# Patient Record
Sex: MALE | Race: WHITE | NOT HISPANIC OR LATINO | Employment: OTHER | URBAN - METROPOLITAN AREA
[De-identification: names, ages, dates, MRNs, and addresses within clinical notes are randomized per-mention and may not be internally consistent; named-entity substitution may affect disease eponyms.]

---

## 2003-12-02 LAB — HCV AB SER-ACNC: POSITIVE

## 2017-08-14 ENCOUNTER — GENERIC CONVERSION - ENCOUNTER (OUTPATIENT)
Dept: OTHER | Facility: OTHER | Age: 57
End: 2017-08-14

## 2017-08-17 ENCOUNTER — ALLSCRIPTS OFFICE VISIT (OUTPATIENT)
Dept: OTHER | Facility: OTHER | Age: 57
End: 2017-08-17

## 2018-01-15 VITALS
RESPIRATION RATE: 15 BRPM | BODY MASS INDEX: 34.23 KG/M2 | DIASTOLIC BLOOD PRESSURE: 68 MMHG | HEIGHT: 71 IN | HEART RATE: 100 BPM | SYSTOLIC BLOOD PRESSURE: 120 MMHG | OXYGEN SATURATION: 96 % | WEIGHT: 244.5 LBS

## 2018-01-29 ENCOUNTER — TELEPHONE (OUTPATIENT)
Dept: FAMILY MEDICINE CLINIC | Facility: CLINIC | Age: 58
End: 2018-01-29

## 2018-01-29 DIAGNOSIS — I10 ESSENTIAL HYPERTENSION: Primary | ICD-10-CM

## 2018-01-29 DIAGNOSIS — E11.610 DIABETIC NEUROPATHIC ARTHRITIS (HCC): Primary | ICD-10-CM

## 2018-01-29 DIAGNOSIS — G62.9 NEUROPATHY: ICD-10-CM

## 2018-01-29 RX ORDER — GABAPENTIN 100 MG/1
1 CAPSULE ORAL 3 TIMES DAILY
COMMUNITY
Start: 2017-10-23 | End: 2018-01-29 | Stop reason: SDUPTHER

## 2018-01-29 RX ORDER — LISINOPRIL 5 MG/1
5 TABLET ORAL DAILY
Qty: 30 TABLET | Refills: 5 | Status: SHIPPED | OUTPATIENT
Start: 2018-01-29 | End: 2018-08-09 | Stop reason: SDUPTHER

## 2018-01-29 RX ORDER — GABAPENTIN 300 MG/1
300 CAPSULE ORAL 3 TIMES DAILY
Qty: 90 CAPSULE | Refills: 5 | Status: SHIPPED | OUTPATIENT
Start: 2018-01-29 | End: 2018-08-13 | Stop reason: SDUPTHER

## 2018-01-29 RX ORDER — LISINOPRIL 5 MG/1
TABLET ORAL
COMMUNITY
End: 2018-01-29 | Stop reason: SDUPTHER

## 2018-01-29 RX ORDER — GABAPENTIN 100 MG/1
100 CAPSULE ORAL 3 TIMES DAILY
Qty: 90 CAPSULE | Refills: 5 | Status: SHIPPED | OUTPATIENT
Start: 2018-01-29 | End: 2018-01-29

## 2018-02-21 ENCOUNTER — TELEPHONE (OUTPATIENT)
Dept: FAMILY MEDICINE CLINIC | Facility: CLINIC | Age: 58
End: 2018-02-21

## 2018-02-21 DIAGNOSIS — E78.5 HYPERLIPIDEMIA, UNSPECIFIED HYPERLIPIDEMIA TYPE: Primary | ICD-10-CM

## 2018-02-21 RX ORDER — ATORVASTATIN CALCIUM 40 MG/1
1 TABLET, FILM COATED ORAL DAILY
COMMUNITY
Start: 2017-08-17 | End: 2018-02-22 | Stop reason: SDUPTHER

## 2018-02-22 RX ORDER — ATORVASTATIN CALCIUM 40 MG/1
40 TABLET, FILM COATED ORAL DAILY
Qty: 30 TABLET | Refills: 5 | Status: SHIPPED | OUTPATIENT
Start: 2018-02-22 | End: 2018-08-24 | Stop reason: SDUPTHER

## 2018-03-15 DIAGNOSIS — E11.9 TYPE 2 DIABETES MELLITUS WITHOUT COMPLICATION, WITHOUT LONG-TERM CURRENT USE OF INSULIN (HCC): Primary | ICD-10-CM

## 2018-03-16 DIAGNOSIS — E11.9 TYPE 2 DIABETES MELLITUS WITHOUT COMPLICATION, WITHOUT LONG-TERM CURRENT USE OF INSULIN (HCC): ICD-10-CM

## 2018-03-16 RX ORDER — SITAGLIPTIN AND METFORMIN HYDROCHLORIDE 1000; 50 MG/1; MG/1
TABLET, FILM COATED ORAL
Qty: 60 TABLET | Refills: 5 | Status: SHIPPED | OUTPATIENT
Start: 2018-03-16 | End: 2018-03-16 | Stop reason: SDUPTHER

## 2018-05-22 DIAGNOSIS — E11.8 TYPE 2 DIABETES MELLITUS WITH COMPLICATION, UNSPECIFIED WHETHER LONG TERM INSULIN USE: Primary | ICD-10-CM

## 2018-05-22 RX ORDER — HUMAN INSULIN 100 [IU]/ML
70 INJECTION, SUSPENSION SUBCUTANEOUS 2 TIMES DAILY
COMMUNITY
Start: 2018-02-21 | End: 2018-05-22 | Stop reason: SDUPTHER

## 2018-05-22 RX ORDER — HUMAN INSULIN 100 [IU]/ML
70 INJECTION, SUSPENSION SUBCUTANEOUS 2 TIMES DAILY
Qty: 30 ML | Refills: 5 | Status: SHIPPED | OUTPATIENT
Start: 2018-05-22 | End: 2018-09-28 | Stop reason: SDUPTHER

## 2018-08-09 DIAGNOSIS — I10 ESSENTIAL HYPERTENSION: ICD-10-CM

## 2018-08-09 PROCEDURE — 4010F ACE/ARB THERAPY RXD/TAKEN: CPT | Performed by: FAMILY MEDICINE

## 2018-08-09 RX ORDER — LISINOPRIL 5 MG/1
5 TABLET ORAL DAILY
Qty: 30 TABLET | Refills: 5 | Status: SHIPPED | OUTPATIENT
Start: 2018-08-09 | End: 2018-09-28

## 2018-08-13 DIAGNOSIS — E11.610 DIABETIC NEUROPATHIC ARTHRITIS (HCC): ICD-10-CM

## 2018-08-13 RX ORDER — GABAPENTIN 300 MG/1
300 CAPSULE ORAL 3 TIMES DAILY
Qty: 90 CAPSULE | Refills: 1 | Status: SHIPPED | OUTPATIENT
Start: 2018-08-13 | End: 2018-10-16 | Stop reason: SDUPTHER

## 2018-08-24 DIAGNOSIS — E78.5 HYPERLIPIDEMIA, UNSPECIFIED HYPERLIPIDEMIA TYPE: ICD-10-CM

## 2018-08-24 RX ORDER — ATORVASTATIN CALCIUM 40 MG/1
40 TABLET, FILM COATED ORAL DAILY
Qty: 30 TABLET | Refills: 5 | Status: SHIPPED | OUTPATIENT
Start: 2018-08-24 | End: 2019-02-19 | Stop reason: SDUPTHER

## 2018-09-15 DIAGNOSIS — E11.9 TYPE 2 DIABETES MELLITUS WITHOUT COMPLICATION, WITHOUT LONG-TERM CURRENT USE OF INSULIN (HCC): ICD-10-CM

## 2018-09-17 RX ORDER — SITAGLIPTIN AND METFORMIN HYDROCHLORIDE 1000; 50 MG/1; MG/1
TABLET, FILM COATED ORAL
Qty: 60 TABLET | Refills: 5 | Status: SHIPPED | OUTPATIENT
Start: 2018-09-17 | End: 2018-09-28

## 2018-09-27 RX ORDER — GABAPENTIN 300 MG/1
CAPSULE ORAL
COMMUNITY
Start: 2018-09-13 | End: 2019-03-29 | Stop reason: SDUPTHER

## 2018-09-28 ENCOUNTER — OFFICE VISIT (OUTPATIENT)
Dept: FAMILY MEDICINE CLINIC | Facility: CLINIC | Age: 58
End: 2018-09-28
Payer: COMMERCIAL

## 2018-09-28 ENCOUNTER — TELEPHONE (OUTPATIENT)
Dept: FAMILY MEDICINE CLINIC | Facility: CLINIC | Age: 58
End: 2018-09-28

## 2018-09-28 VITALS
HEIGHT: 70 IN | HEART RATE: 97 BPM | WEIGHT: 251.4 LBS | TEMPERATURE: 98.2 F | BODY MASS INDEX: 35.99 KG/M2 | RESPIRATION RATE: 15 BRPM | OXYGEN SATURATION: 98 % | SYSTOLIC BLOOD PRESSURE: 130 MMHG | DIASTOLIC BLOOD PRESSURE: 80 MMHG

## 2018-09-28 DIAGNOSIS — Z23 NEED FOR INFLUENZA VACCINATION: Primary | ICD-10-CM

## 2018-09-28 DIAGNOSIS — I10 ESSENTIAL HYPERTENSION: ICD-10-CM

## 2018-09-28 DIAGNOSIS — R52 PAIN: Primary | ICD-10-CM

## 2018-09-28 DIAGNOSIS — E78.5 HYPERLIPIDEMIA, UNSPECIFIED HYPERLIPIDEMIA TYPE: ICD-10-CM

## 2018-09-28 DIAGNOSIS — Z23 NEED FOR PNEUMOCOCCAL VACCINATION: ICD-10-CM

## 2018-09-28 DIAGNOSIS — E11.8 TYPE 2 DIABETES MELLITUS WITH COMPLICATION, UNSPECIFIED WHETHER LONG TERM INSULIN USE: ICD-10-CM

## 2018-09-28 PROBLEM — E11.9 DIABETES (HCC): Status: ACTIVE | Noted: 2018-09-28

## 2018-09-28 PROCEDURE — 3079F DIAST BP 80-89 MM HG: CPT | Performed by: FAMILY MEDICINE

## 2018-09-28 PROCEDURE — 3008F BODY MASS INDEX DOCD: CPT | Performed by: FAMILY MEDICINE

## 2018-09-28 PROCEDURE — 90732 PPSV23 VACC 2 YRS+ SUBQ/IM: CPT

## 2018-09-28 PROCEDURE — 3075F SYST BP GE 130 - 139MM HG: CPT | Performed by: FAMILY MEDICINE

## 2018-09-28 PROCEDURE — G0008 ADMIN INFLUENZA VIRUS VAC: HCPCS

## 2018-09-28 PROCEDURE — 4010F ACE/ARB THERAPY RXD/TAKEN: CPT | Performed by: FAMILY MEDICINE

## 2018-09-28 PROCEDURE — G0009 ADMIN PNEUMOCOCCAL VACCINE: HCPCS

## 2018-09-28 PROCEDURE — 3725F SCREEN DEPRESSION PERFORMED: CPT | Performed by: FAMILY MEDICINE

## 2018-09-28 PROCEDURE — 99214 OFFICE O/P EST MOD 30 MIN: CPT | Performed by: FAMILY MEDICINE

## 2018-09-28 PROCEDURE — 90682 RIV4 VACC RECOMBINANT DNA IM: CPT

## 2018-09-28 RX ORDER — IBUPROFEN 800 MG/1
800 TABLET ORAL EVERY 6 HOURS PRN
Qty: 120 TABLET | Refills: 5 | Status: SHIPPED | OUTPATIENT
Start: 2018-09-28 | End: 2019-10-03 | Stop reason: SDUPTHER

## 2018-09-28 RX ORDER — HUMAN INSULIN 100 [IU]/ML
70 INJECTION, SUSPENSION SUBCUTANEOUS 2 TIMES DAILY
Qty: 30 ML | Refills: 5 | Status: SHIPPED | OUTPATIENT
Start: 2018-09-28 | End: 2019-02-11 | Stop reason: SDUPTHER

## 2018-09-28 RX ORDER — LISINOPRIL 10 MG/1
10 TABLET ORAL DAILY
Qty: 30 TABLET | Refills: 5 | Status: SHIPPED | OUTPATIENT
Start: 2018-09-28 | End: 2019-04-01 | Stop reason: SDUPTHER

## 2018-09-28 RX ORDER — GLIPIZIDE AND METFORMIN HCL 5; 500 MG/1; MG/1
1 TABLET, FILM COATED ORAL
Qty: 60 TABLET | Refills: 5 | Status: SHIPPED | OUTPATIENT
Start: 2018-09-28 | End: 2019-03-29 | Stop reason: SDUPTHER

## 2018-09-28 NOTE — PROGRESS NOTES
Assessment/Plan:    No problem-specific Assessment & Plan notes found for this encounter  Diagnoses and all orders for this visit:    Need for influenza vaccination  -     influenza vaccine, 5918-9556, quadrivalent, recombinant, PF, 0 5 mL, for patients 18 yr+ ([facility])    Type 2 diabetes mellitus with complication, unspecified whether long term insulin use (HCC)  -     NOVOLIN 70/30 RELION (70-30) 100 UNIT/ML subcutaneous injection; Inject 70 Units under the skin 2 (two) times a day  -     glipiZIDE-metFORMIN (METAGLIP) 5-500 MG per tablet; Take 1 tablet by mouth 2 (two) times a day before meals  -     lisinopril (ZESTRIL) 10 mg tablet; Take 1 tablet (10 mg total) by mouth daily  -     Hemoglobin A1c (w/out EAG); Future  -     Lipid panel; Future  -     Comprehensive metabolic panel; Future  -     Microalbumin / creatinine urine ratio  -     TSH, 3rd generation; Future    Essential hypertension  -     CBC and differential; Future    Hyperlipidemia, unspecified hyperlipidemia type  -     Lipid panel; Future  -     Comprehensive metabolic panel; Future          Subjective:      Patient ID: Kris Garcia is a 63 y o  male  He is not taking Janumet due to cost   His blood sugar is not well controlled  His BP is fairly well controlled  He has no CP or SOB  He has no HA or vision changes  He is on high intensity statin therapy  He has no myalgia or muscle weakness  The following portions of the patient's history were reviewed and updated as appropriate: allergies, current medications, past family history, past medical history, past social history and past surgical history  Review of Systems   All other systems reviewed and are negative          Objective:      /80 (BP Location: Right arm, Patient Position: Sitting, Cuff Size: Large)   Pulse 97   Temp 98 2 °F (36 8 °C) (Temporal)   Resp 15   Ht 5' 10" (1 778 m)   Wt 114 kg (251 lb 6 4 oz)   SpO2 98%   BMI 36 07 kg/m² Physical Exam   Constitutional: He is oriented to person, place, and time  He appears well-developed and well-nourished  Neck: Normal range of motion  Neck supple  Cardiovascular: Normal rate, regular rhythm, normal heart sounds and intact distal pulses  Abdominal: Soft  Bowel sounds are normal    Musculoskeletal: Normal range of motion  Neurological: He is alert and oriented to person, place, and time  He has normal reflexes  Skin: Skin is warm and dry  Psychiatric: He has a normal mood and affect  His behavior is normal  Judgment and thought content normal    Nursing note and vitals reviewed

## 2018-10-16 DIAGNOSIS — E11.610 DIABETIC NEUROPATHIC ARTHRITIS (HCC): ICD-10-CM

## 2018-10-16 RX ORDER — GABAPENTIN 300 MG/1
300 CAPSULE ORAL 3 TIMES DAILY
Qty: 90 CAPSULE | Refills: 5 | Status: SHIPPED | OUTPATIENT
Start: 2018-10-16 | End: 2018-10-16 | Stop reason: SDUPTHER

## 2018-10-16 RX ORDER — GABAPENTIN 300 MG/1
300 CAPSULE ORAL 3 TIMES DAILY
Qty: 90 CAPSULE | Refills: 5 | Status: SHIPPED | OUTPATIENT
Start: 2018-10-16 | End: 2021-08-02 | Stop reason: SDUPTHER

## 2019-02-11 ENCOUNTER — TELEPHONE (OUTPATIENT)
Dept: FAMILY MEDICINE CLINIC | Facility: CLINIC | Age: 59
End: 2019-02-11

## 2019-02-11 DIAGNOSIS — E11.8 TYPE 2 DIABETES MELLITUS WITH COMPLICATION, UNSPECIFIED WHETHER LONG TERM INSULIN USE: ICD-10-CM

## 2019-02-11 RX ORDER — HUMAN INSULIN 100 [IU]/ML
70 INJECTION, SUSPENSION SUBCUTANEOUS 2 TIMES DAILY
Qty: 30 ML | Refills: 5 | Status: SHIPPED | OUTPATIENT
Start: 2019-02-11 | End: 2019-06-04 | Stop reason: SDUPTHER

## 2019-02-19 DIAGNOSIS — E78.5 HYPERLIPIDEMIA, UNSPECIFIED HYPERLIPIDEMIA TYPE: ICD-10-CM

## 2019-02-19 RX ORDER — ATORVASTATIN CALCIUM 40 MG/1
40 TABLET, FILM COATED ORAL DAILY
Qty: 30 TABLET | Refills: 5 | Status: SHIPPED | OUTPATIENT
Start: 2019-02-19 | End: 2019-05-13 | Stop reason: SDUPTHER

## 2019-02-26 ENCOUNTER — CLINICAL SUPPORT (OUTPATIENT)
Dept: FAMILY MEDICINE CLINIC | Facility: CLINIC | Age: 59
End: 2019-02-26
Payer: COMMERCIAL

## 2019-02-26 DIAGNOSIS — I10 ESSENTIAL HYPERTENSION: ICD-10-CM

## 2019-02-26 DIAGNOSIS — E11.8 TYPE 2 DIABETES MELLITUS WITH COMPLICATION, UNSPECIFIED WHETHER LONG TERM INSULIN USE: ICD-10-CM

## 2019-02-26 DIAGNOSIS — E78.5 HYPERLIPIDEMIA, UNSPECIFIED HYPERLIPIDEMIA TYPE: ICD-10-CM

## 2019-02-26 LAB
ALBUMIN SERPL BCP-MCNC: 4 G/DL (ref 3.5–5)
ALP SERPL-CCNC: 61 U/L (ref 46–116)
ALT SERPL W P-5'-P-CCNC: 58 U/L (ref 12–78)
ANION GAP SERPL CALCULATED.3IONS-SCNC: 4 MMOL/L (ref 4–13)
AST SERPL W P-5'-P-CCNC: 39 U/L (ref 5–45)
BASOPHILS # BLD AUTO: 0.1 THOUSANDS/ΜL (ref 0–0.1)
BASOPHILS NFR BLD AUTO: 1 % (ref 0–1)
BILIRUB SERPL-MCNC: 0.52 MG/DL (ref 0.2–1)
BUN SERPL-MCNC: 17 MG/DL (ref 5–25)
CALCIUM SERPL-MCNC: 9.2 MG/DL (ref 8.3–10.1)
CHLORIDE SERPL-SCNC: 101 MMOL/L (ref 100–108)
CHOLEST SERPL-MCNC: 128 MG/DL (ref 50–200)
CO2 SERPL-SCNC: 28 MMOL/L (ref 21–32)
CREAT SERPL-MCNC: 1.42 MG/DL (ref 0.6–1.3)
EOSINOPHIL # BLD AUTO: 0.17 THOUSAND/ΜL (ref 0–0.61)
EOSINOPHIL NFR BLD AUTO: 2 % (ref 0–6)
ERYTHROCYTE [DISTWIDTH] IN BLOOD BY AUTOMATED COUNT: 12.7 % (ref 11.6–15.1)
EST. AVERAGE GLUCOSE BLD GHB EST-MCNC: 255 MG/DL
GFR SERPL CREATININE-BSD FRML MDRD: 54 ML/MIN/1.73SQ M
GLUCOSE P FAST SERPL-MCNC: 238 MG/DL (ref 65–99)
HBA1C MFR BLD: 10.5 % (ref 4.2–6.3)
HCT VFR BLD AUTO: 42.5 % (ref 36.5–49.3)
HDLC SERPL-MCNC: 56 MG/DL (ref 40–60)
HGB BLD-MCNC: 13.6 G/DL (ref 12–17)
IMM GRANULOCYTES # BLD AUTO: 0.03 THOUSAND/UL (ref 0–0.2)
IMM GRANULOCYTES NFR BLD AUTO: 0 % (ref 0–2)
LDLC SERPL CALC-MCNC: 56 MG/DL (ref 0–100)
LYMPHOCYTES # BLD AUTO: 2.07 THOUSANDS/ΜL (ref 0.6–4.47)
LYMPHOCYTES NFR BLD AUTO: 25 % (ref 14–44)
MCH RBC QN AUTO: 31.3 PG (ref 26.8–34.3)
MCHC RBC AUTO-ENTMCNC: 32 G/DL (ref 31.4–37.4)
MCV RBC AUTO: 98 FL (ref 82–98)
MONOCYTES # BLD AUTO: 0.66 THOUSAND/ΜL (ref 0.17–1.22)
MONOCYTES NFR BLD AUTO: 8 % (ref 4–12)
NEUTROPHILS # BLD AUTO: 5.32 THOUSANDS/ΜL (ref 1.85–7.62)
NEUTS SEG NFR BLD AUTO: 64 % (ref 43–75)
NONHDLC SERPL-MCNC: 72 MG/DL
NRBC BLD AUTO-RTO: 0 /100 WBCS
PLATELET # BLD AUTO: 258 THOUSANDS/UL (ref 149–390)
PMV BLD AUTO: 10.3 FL (ref 8.9–12.7)
POTASSIUM SERPL-SCNC: 4.6 MMOL/L (ref 3.5–5.3)
PROT SERPL-MCNC: 8.1 G/DL (ref 6.4–8.2)
RBC # BLD AUTO: 4.34 MILLION/UL (ref 3.88–5.62)
SODIUM SERPL-SCNC: 133 MMOL/L (ref 136–145)
TRIGL SERPL-MCNC: 81 MG/DL
TSH SERPL DL<=0.05 MIU/L-ACNC: 2.56 UIU/ML (ref 0.36–3.74)
WBC # BLD AUTO: 8.35 THOUSAND/UL (ref 4.31–10.16)

## 2019-02-26 PROCEDURE — 83036 HEMOGLOBIN GLYCOSYLATED A1C: CPT | Performed by: FAMILY MEDICINE

## 2019-02-26 PROCEDURE — 80053 COMPREHEN METABOLIC PANEL: CPT | Performed by: FAMILY MEDICINE

## 2019-02-26 PROCEDURE — 36415 COLL VENOUS BLD VENIPUNCTURE: CPT | Performed by: FAMILY MEDICINE

## 2019-02-26 PROCEDURE — 80061 LIPID PANEL: CPT | Performed by: FAMILY MEDICINE

## 2019-02-26 PROCEDURE — 85025 COMPLETE CBC W/AUTO DIFF WBC: CPT | Performed by: FAMILY MEDICINE

## 2019-02-26 PROCEDURE — 84443 ASSAY THYROID STIM HORMONE: CPT | Performed by: FAMILY MEDICINE

## 2019-03-29 ENCOUNTER — OFFICE VISIT (OUTPATIENT)
Dept: FAMILY MEDICINE CLINIC | Facility: CLINIC | Age: 59
End: 2019-03-29
Payer: COMMERCIAL

## 2019-03-29 VITALS
BODY MASS INDEX: 37.11 KG/M2 | HEIGHT: 70 IN | DIASTOLIC BLOOD PRESSURE: 70 MMHG | HEART RATE: 90 BPM | WEIGHT: 259.2 LBS | RESPIRATION RATE: 15 BRPM | SYSTOLIC BLOOD PRESSURE: 118 MMHG | OXYGEN SATURATION: 96 %

## 2019-03-29 DIAGNOSIS — Z00.00 MEDICARE ANNUAL WELLNESS VISIT, INITIAL: ICD-10-CM

## 2019-03-29 DIAGNOSIS — E11.42 DIABETIC POLYNEUROPATHY ASSOCIATED WITH TYPE 2 DIABETES MELLITUS (HCC): Primary | ICD-10-CM

## 2019-03-29 DIAGNOSIS — E78.2 MIXED HYPERLIPIDEMIA: ICD-10-CM

## 2019-03-29 DIAGNOSIS — I10 ESSENTIAL HYPERTENSION: ICD-10-CM

## 2019-03-29 DIAGNOSIS — E11.8 TYPE 2 DIABETES MELLITUS WITH COMPLICATION, UNSPECIFIED WHETHER LONG TERM INSULIN USE: ICD-10-CM

## 2019-03-29 PROCEDURE — G0439 PPPS, SUBSEQ VISIT: HCPCS | Performed by: FAMILY MEDICINE

## 2019-03-29 PROCEDURE — 99214 OFFICE O/P EST MOD 30 MIN: CPT | Performed by: FAMILY MEDICINE

## 2019-03-29 PROCEDURE — 3078F DIAST BP <80 MM HG: CPT | Performed by: FAMILY MEDICINE

## 2019-03-29 PROCEDURE — 3074F SYST BP LT 130 MM HG: CPT | Performed by: FAMILY MEDICINE

## 2019-03-29 PROCEDURE — 3008F BODY MASS INDEX DOCD: CPT | Performed by: FAMILY MEDICINE

## 2019-03-29 RX ORDER — GLIPIZIDE AND METFORMIN HCL 5; 500 MG/1; MG/1
2 TABLET, FILM COATED ORAL
Qty: 120 TABLET | Refills: 5 | Status: SHIPPED | OUTPATIENT
Start: 2019-03-29 | End: 2019-04-02 | Stop reason: SDUPTHER

## 2019-03-29 RX ORDER — GABAPENTIN 300 MG/1
600 CAPSULE ORAL 3 TIMES DAILY
Qty: 90 CAPSULE | Refills: 5 | Status: SHIPPED | OUTPATIENT
Start: 2019-03-29 | End: 2019-04-10 | Stop reason: SDUPTHER

## 2019-04-01 DIAGNOSIS — E11.8 TYPE 2 DIABETES MELLITUS WITH COMPLICATION, UNSPECIFIED WHETHER LONG TERM INSULIN USE: ICD-10-CM

## 2019-04-01 PROCEDURE — 4010F ACE/ARB THERAPY RXD/TAKEN: CPT | Performed by: FAMILY MEDICINE

## 2019-04-01 RX ORDER — LISINOPRIL 10 MG/1
TABLET ORAL
Qty: 30 TABLET | Refills: 5 | Status: SHIPPED | OUTPATIENT
Start: 2019-04-01 | End: 2019-05-13 | Stop reason: SDUPTHER

## 2019-04-02 DIAGNOSIS — E11.8 TYPE 2 DIABETES MELLITUS WITH COMPLICATION, UNSPECIFIED WHETHER LONG TERM INSULIN USE: ICD-10-CM

## 2019-04-03 RX ORDER — GLIPIZIDE AND METFORMIN HCL 5; 500 MG/1; MG/1
TABLET, FILM COATED ORAL
Qty: 60 TABLET | Refills: 5 | Status: SHIPPED | OUTPATIENT
Start: 2019-04-03 | End: 2019-09-07 | Stop reason: SDUPTHER

## 2019-04-10 ENCOUNTER — TELEPHONE (OUTPATIENT)
Dept: FAMILY MEDICINE CLINIC | Facility: CLINIC | Age: 59
End: 2019-04-10

## 2019-04-10 DIAGNOSIS — E11.42 DIABETIC POLYNEUROPATHY ASSOCIATED WITH TYPE 2 DIABETES MELLITUS (HCC): ICD-10-CM

## 2019-04-10 DIAGNOSIS — E11.8 TYPE 2 DIABETES MELLITUS WITH COMPLICATION, UNSPECIFIED WHETHER LONG TERM INSULIN USE: ICD-10-CM

## 2019-04-10 RX ORDER — GABAPENTIN 300 MG/1
600 CAPSULE ORAL 3 TIMES DAILY
Qty: 180 CAPSULE | Refills: 5 | Status: SHIPPED | OUTPATIENT
Start: 2019-04-10 | End: 2021-08-02 | Stop reason: SDUPTHER

## 2019-05-13 DIAGNOSIS — E78.5 HYPERLIPIDEMIA, UNSPECIFIED HYPERLIPIDEMIA TYPE: ICD-10-CM

## 2019-05-13 DIAGNOSIS — E11.8 TYPE 2 DIABETES MELLITUS WITH COMPLICATION, UNSPECIFIED WHETHER LONG TERM INSULIN USE: ICD-10-CM

## 2019-05-13 RX ORDER — LISINOPRIL 10 MG/1
10 TABLET ORAL DAILY
Qty: 90 TABLET | Refills: 3 | Status: SHIPPED | OUTPATIENT
Start: 2019-05-13 | End: 2019-05-15 | Stop reason: SDUPTHER

## 2019-05-13 RX ORDER — ATORVASTATIN CALCIUM 40 MG/1
40 TABLET, FILM COATED ORAL DAILY
Qty: 90 TABLET | Refills: 3 | Status: SHIPPED | OUTPATIENT
Start: 2019-05-13 | End: 2019-05-15 | Stop reason: SDUPTHER

## 2019-05-15 DIAGNOSIS — E78.5 HYPERLIPIDEMIA, UNSPECIFIED HYPERLIPIDEMIA TYPE: ICD-10-CM

## 2019-05-15 DIAGNOSIS — E11.8 TYPE 2 DIABETES MELLITUS WITH COMPLICATION, UNSPECIFIED WHETHER LONG TERM INSULIN USE: ICD-10-CM

## 2019-05-15 RX ORDER — ATORVASTATIN CALCIUM 40 MG/1
40 TABLET, FILM COATED ORAL DAILY
Qty: 90 TABLET | Refills: 3 | Status: SHIPPED | OUTPATIENT
Start: 2019-05-15 | End: 2020-02-27

## 2019-05-15 RX ORDER — LISINOPRIL 10 MG/1
10 TABLET ORAL DAILY
Qty: 90 TABLET | Refills: 3 | Status: SHIPPED | OUTPATIENT
Start: 2019-05-15 | End: 2020-02-27

## 2019-06-04 DIAGNOSIS — E11.8 TYPE 2 DIABETES MELLITUS WITH COMPLICATION, UNSPECIFIED WHETHER LONG TERM INSULIN USE: ICD-10-CM

## 2019-06-04 RX ORDER — HUMAN INSULIN 100 [IU]/ML
70 INJECTION, SUSPENSION SUBCUTANEOUS 2 TIMES DAILY
Qty: 30 ML | Refills: 5 | Status: SHIPPED | OUTPATIENT
Start: 2019-06-04 | End: 2019-06-12 | Stop reason: SDUPTHER

## 2019-06-12 ENCOUNTER — TELEPHONE (OUTPATIENT)
Dept: FAMILY MEDICINE CLINIC | Facility: CLINIC | Age: 59
End: 2019-06-12

## 2019-06-12 DIAGNOSIS — E11.8 TYPE 2 DIABETES MELLITUS WITH COMPLICATION, UNSPECIFIED WHETHER LONG TERM INSULIN USE: ICD-10-CM

## 2019-06-12 RX ORDER — HUMAN INSULIN 100 [IU]/ML
70 INJECTION, SUSPENSION SUBCUTANEOUS 2 TIMES DAILY
Qty: 40 ML | Refills: 5 | Status: SHIPPED | OUTPATIENT
Start: 2019-06-12 | End: 2019-06-24 | Stop reason: SDUPTHER

## 2019-06-24 DIAGNOSIS — E11.8 TYPE 2 DIABETES MELLITUS WITH COMPLICATION, UNSPECIFIED WHETHER LONG TERM INSULIN USE: ICD-10-CM

## 2019-06-24 RX ORDER — HUMAN INSULIN 100 [IU]/ML
70 INJECTION, SUSPENSION SUBCUTANEOUS 2 TIMES DAILY
Qty: 40 ML | Refills: 5 | Status: SHIPPED | OUTPATIENT
Start: 2019-06-24 | End: 2020-01-07

## 2019-07-11 ENCOUNTER — OFFICE VISIT (OUTPATIENT)
Dept: FAMILY MEDICINE CLINIC | Facility: CLINIC | Age: 59
End: 2019-07-11
Payer: COMMERCIAL

## 2019-07-11 VITALS
WEIGHT: 252 LBS | HEART RATE: 89 BPM | HEIGHT: 70 IN | OXYGEN SATURATION: 98 % | DIASTOLIC BLOOD PRESSURE: 74 MMHG | BODY MASS INDEX: 36.08 KG/M2 | RESPIRATION RATE: 16 BRPM | SYSTOLIC BLOOD PRESSURE: 124 MMHG

## 2019-07-11 DIAGNOSIS — E11.42 TYPE 2 DIABETES MELLITUS WITH DIABETIC POLYNEUROPATHY, WITH LONG-TERM CURRENT USE OF INSULIN (HCC): ICD-10-CM

## 2019-07-11 DIAGNOSIS — Z79.4 TYPE 2 DIABETES MELLITUS WITH COMPLICATION, WITH LONG-TERM CURRENT USE OF INSULIN (HCC): Primary | ICD-10-CM

## 2019-07-11 DIAGNOSIS — E11.42 DIABETIC POLYNEUROPATHY ASSOCIATED WITH TYPE 2 DIABETES MELLITUS (HCC): Primary | ICD-10-CM

## 2019-07-11 DIAGNOSIS — I10 ESSENTIAL HYPERTENSION: ICD-10-CM

## 2019-07-11 DIAGNOSIS — E11.8 TYPE 2 DIABETES MELLITUS WITH COMPLICATION, WITH LONG-TERM CURRENT USE OF INSULIN (HCC): Primary | ICD-10-CM

## 2019-07-11 DIAGNOSIS — E78.2 MIXED HYPERLIPIDEMIA: ICD-10-CM

## 2019-07-11 DIAGNOSIS — Z79.4 TYPE 2 DIABETES MELLITUS WITH DIABETIC POLYNEUROPATHY, WITH LONG-TERM CURRENT USE OF INSULIN (HCC): ICD-10-CM

## 2019-07-11 LAB — SL AMB POCT HEMOGLOBIN AIC: 10.3 (ref ?–6.5)

## 2019-07-11 PROCEDURE — 3074F SYST BP LT 130 MM HG: CPT | Performed by: FAMILY MEDICINE

## 2019-07-11 PROCEDURE — 83036 HEMOGLOBIN GLYCOSYLATED A1C: CPT

## 2019-07-11 PROCEDURE — 99214 OFFICE O/P EST MOD 30 MIN: CPT | Performed by: FAMILY MEDICINE

## 2019-07-11 PROCEDURE — 3008F BODY MASS INDEX DOCD: CPT | Performed by: FAMILY MEDICINE

## 2019-07-11 RX ORDER — DULOXETIN HYDROCHLORIDE 30 MG/1
30 CAPSULE, DELAYED RELEASE ORAL DAILY
Qty: 30 CAPSULE | Refills: 5 | Status: SHIPPED | OUTPATIENT
Start: 2019-07-11 | End: 2021-08-06

## 2019-07-11 NOTE — ASSESSMENT & PLAN NOTE
Lab Results   Component Value Date    HGBA1C 10 3 (A) 07/11/2019     Discussed diet and exercise  He is resistant to change in medications

## 2019-07-11 NOTE — PROGRESS NOTES
Assessment/Plan:    Diabetes ([place] [address] )  Lab Results   Component Value Date    HGBA1C 10 3 (A) 07/11/2019     Discussed diet and exercise  He is resistant to change in medications  Hypertension  Stable  Continue current  Hyperlipidemia  Stable  Continue current  Diagnoses and all orders for this visit:    Diabetic polyneuropathy associated with type 2 diabetes mellitus ([facility] 75 )  -     DULoxetine (CYMBALTA) 30 mg delayed release capsule; Take 1 capsule (30 mg total) by mouth daily    Essential hypertension    Mixed hyperlipidemia    Type 2 diabetes mellitus with diabetic polyneuropathy, with long-term current use of insulin ([facility])      BMI Counseling: Body mass index is 36 16 kg/m²  Discussed the patient's BMI with him  The BMI is above average  BMI counseling and education was provided to the patient  Nutrition recommendations include reducing portion sizes, decreasing overall calorie intake, 3-5 servings of fruits/vegetables daily, reducing fast food intake, consuming healthier snacks, decreasing soda and/or juice intake, moderation in carbohydrate intake, increasing intake of lean protein, reducing intake of saturated fat and trans fat and reducing intake of cholesterol  Exercise recommendations include moderate aerobic physical activity for 150 minutes/week, vigorous aerobic physical activity for 75 minutes/week, exercising 3-5 times per week, joining a gym and strength training exercises  Subjective:      Patient ID: Franny Hazel is a 63 y o  male  Patient presents to the office for follow up of chronic medical conditions  HGBA1C in the office today was 10 3  Patient states that he has been making dietary changes including increasing vegetable intake  He does admit to drinking 2 glasses of Pepsi per day, but tries to drink water in between  The patient states that he does try to be more active during the summer months, but does not routinely exercise     Patient has been checking his sugars 4 times daily  His AM sugars in the 200s, afternoon sugars around 150 and evening sugars in the 200s  He denies low blood sugar readings or symptoms of hypoglycemia  The patient states that he has been taking his medications as prescribed, but was given 3 vials of insulin instead of 4 and as a result ran out of insulin on Saturday  The patient does admit to chronic peripheral neuropathy in the hands and lower extremities for which he takes neurontin  Patient does follow with Dr Beck Mejia, Podiatry, and the patient states he was seen a couple weeks ago  Patient denies any vision changes but does note that he is due for a diabetic eye exam       He is very resistant to changes in his diabetic regimen  HTN well controlled with medication  Patient denies headaches, chest pain, shortness of breath, vision change, leg swelling  Patient denies side effects of the antihypertensive as well as his statin medication  His lipids are at goal   He is on high-intensity statin therapy  The following portions of the patient's history were reviewed and updated as appropriate:   He  has no past medical history on file  He   Patient Active Problem List    Diagnosis Date Noted    Diabetic polyneuropathy associated with type 2 diabetes mellitus ([facility] 75 ) 03/29/2019    Diabetes ([facility] ) 09/28/2018    Hyperlipidemia 09/28/2018    Hypertension 09/28/2018     He  has a past surgical history that includes Appendectomy; Back surgery; Elbow surgery; Tonsillectomy; and Wrist surgery  His family history includes Diabetes in his father and mother; Heart disease in his mother; Stroke in his brother and sister  He  reports that he has never smoked  He has never used smokeless tobacco  His alcohol and drug histories are not on file    Current Outpatient Medications   Medication Sig Dispense Refill    atorvastatin (LIPITOR) 40 mg tablet Take 1 tablet (40 mg total) by mouth daily 90 tablet 3    DULoxetine (CYMBALTA) 30 mg delayed release capsule Take 1 capsule (30 mg total) by mouth daily 30 capsule 5    gabapentin (NEURONTIN) 300 mg capsule Take 1 capsule (300 mg total) by mouth 3 (three) times a day 90 capsule 5    gabapentin (NEURONTIN) 300 mg capsule Take 2 capsules (600 mg total) by mouth 3 (three) times a day 180 capsule 5    glipiZIDE-metFORMIN (METAGLIP) 5-500 MG per tablet TAKE 1 TABLET BY MOUTH TWICE A DAY BEFORE MEALS 60 tablet 5    ibuprofen (MOTRIN) 800 mg tablet Take 1 tablet (800 mg total) by mouth every 6 (six) hours as needed for mild pain 120 tablet 5    lisinopril (ZESTRIL) 10 mg tablet Take 1 tablet (10 mg total) by mouth daily 90 tablet 3    NOVOLIN 70/30 RELION (70-30) 100 UNIT/ML subcutaneous injection Inject 70 Units under the skin 2 (two) times a day 40 mL 5     No current facility-administered medications for this visit  Current Outpatient Medications on File Prior to Visit   Medication Sig    atorvastatin (LIPITOR) 40 mg tablet Take 1 tablet (40 mg total) by mouth daily    gabapentin (NEURONTIN) 300 mg capsule Take 1 capsule (300 mg total) by mouth 3 (three) times a day    gabapentin (NEURONTIN) 300 mg capsule Take 2 capsules (600 mg total) by mouth 3 (three) times a day    glipiZIDE-metFORMIN (METAGLIP) 5-500 MG per tablet TAKE 1 TABLET BY MOUTH TWICE A DAY BEFORE MEALS    ibuprofen (MOTRIN) 800 mg tablet Take 1 tablet (800 mg total) by mouth every 6 (six) hours as needed for mild pain    lisinopril (ZESTRIL) 10 mg tablet Take 1 tablet (10 mg total) by mouth daily    NOVOLIN 70/30 RELION (70-30) 100 UNIT/ML subcutaneous injection Inject 70 Units under the skin 2 (two) times a day     No current facility-administered medications on file prior to visit  He has No Known Allergies       Review of Systems   Constitutional: Negative for activity change, appetite change, diaphoresis and unexpected weight change  Eyes: Negative for visual disturbance     Respiratory: Negative for shortness of breath  Cardiovascular: Negative for chest pain  Gastrointestinal: Negative for abdominal pain, nausea and vomiting  Endocrine: Positive for polyuria  All other systems reviewed and are negative  Objective:      /74   Pulse 89   Resp 16   Ht 5' 10" (1 778 m)   Wt 114 kg (252 lb)   SpO2 98%   BMI 36 16 kg/m²          Physical Exam   Constitutional: He is oriented to person, place, and time  He appears well-developed and well-nourished  HENT:   Head: Normocephalic and atraumatic  Eyes: Conjunctivae and EOM are normal    Neck: Normal range of motion  Neck supple  No JVD present  No tracheal deviation present  No thyromegaly present  Cardiovascular: Normal rate, regular rhythm, normal heart sounds and intact distal pulses  Pulmonary/Chest: Effort normal and breath sounds normal  No stridor  No respiratory distress  He has no wheezes  He has no rales  He exhibits no tenderness  Abdominal: Soft  Bowel sounds are normal  He exhibits no distension  There is no tenderness  Musculoskeletal: Normal range of motion  He exhibits no edema  Lymphadenopathy:     He has no cervical adenopathy  Neurological: He is alert and oriented to person, place, and time  Skin: Skin is warm and dry  Capillary refill takes less than 2 seconds  Decreased sensation bilateral hands and feet  Psychiatric: He has a normal mood and affect  His behavior is normal  Judgment and thought content normal    Nursing note and vitals reviewed

## 2019-07-31 PROBLEM — Z00.00 MEDICARE ANNUAL WELLNESS VISIT, INITIAL: Status: ACTIVE | Noted: 2019-07-31

## 2019-09-07 DIAGNOSIS — E11.8 TYPE 2 DIABETES MELLITUS WITH COMPLICATION, UNSPECIFIED WHETHER LONG TERM INSULIN USE: ICD-10-CM

## 2019-09-08 RX ORDER — GABAPENTIN 300 MG/1
CAPSULE ORAL
Qty: 540 CAPSULE | Refills: 1 | Status: SHIPPED | OUTPATIENT
Start: 2019-09-08 | End: 2020-02-28

## 2019-09-08 RX ORDER — GLIPIZIDE AND METFORMIN HCL 5; 500 MG/1; MG/1
TABLET, FILM COATED ORAL
Qty: 360 TABLET | Refills: 1 | Status: SHIPPED | OUTPATIENT
Start: 2019-09-08 | End: 2020-02-28

## 2019-10-03 DIAGNOSIS — R52 PAIN: ICD-10-CM

## 2019-10-03 RX ORDER — IBUPROFEN 800 MG/1
800 TABLET ORAL EVERY 6 HOURS PRN
Qty: 120 TABLET | Refills: 5 | Status: SHIPPED | OUTPATIENT
Start: 2019-10-03 | End: 2020-07-07 | Stop reason: SDUPTHER

## 2019-10-30 ENCOUNTER — OFFICE VISIT (OUTPATIENT)
Dept: FAMILY MEDICINE CLINIC | Facility: CLINIC | Age: 59
End: 2019-10-30
Payer: COMMERCIAL

## 2019-10-30 VITALS
HEIGHT: 70 IN | HEART RATE: 85 BPM | DIASTOLIC BLOOD PRESSURE: 76 MMHG | RESPIRATION RATE: 16 BRPM | WEIGHT: 259 LBS | SYSTOLIC BLOOD PRESSURE: 124 MMHG | BODY MASS INDEX: 37.08 KG/M2 | OXYGEN SATURATION: 98 %

## 2019-10-30 DIAGNOSIS — I10 ESSENTIAL HYPERTENSION: ICD-10-CM

## 2019-10-30 DIAGNOSIS — Z23 NEED FOR IMMUNIZATION AGAINST INFLUENZA: Primary | ICD-10-CM

## 2019-10-30 DIAGNOSIS — Z79.4 TYPE 2 DIABETES MELLITUS WITH OTHER SPECIFIED COMPLICATION, WITH LONG-TERM CURRENT USE OF INSULIN (HCC): Primary | ICD-10-CM

## 2019-10-30 DIAGNOSIS — E11.42 DIABETIC POLYNEUROPATHY ASSOCIATED WITH TYPE 2 DIABETES MELLITUS (HCC): ICD-10-CM

## 2019-10-30 DIAGNOSIS — E78.2 MIXED HYPERLIPIDEMIA: ICD-10-CM

## 2019-10-30 DIAGNOSIS — E11.69 TYPE 2 DIABETES MELLITUS WITH OTHER SPECIFIED COMPLICATION, WITH LONG-TERM CURRENT USE OF INSULIN (HCC): Primary | ICD-10-CM

## 2019-10-30 LAB — SL AMB POCT HEMOGLOBIN AIC: 9.4 (ref ?–6.5)

## 2019-10-30 PROCEDURE — 3078F DIAST BP <80 MM HG: CPT | Performed by: FAMILY MEDICINE

## 2019-10-30 PROCEDURE — 99214 OFFICE O/P EST MOD 30 MIN: CPT | Performed by: FAMILY MEDICINE

## 2019-10-30 PROCEDURE — 3074F SYST BP LT 130 MM HG: CPT | Performed by: FAMILY MEDICINE

## 2019-10-30 PROCEDURE — 83036 HEMOGLOBIN GLYCOSYLATED A1C: CPT | Performed by: FAMILY MEDICINE

## 2019-10-30 PROCEDURE — G0008 ADMIN INFLUENZA VIRUS VAC: HCPCS

## 2019-10-30 PROCEDURE — 90682 RIV4 VACC RECOMBINANT DNA IM: CPT

## 2019-10-30 PROCEDURE — 3008F BODY MASS INDEX DOCD: CPT | Performed by: FAMILY MEDICINE

## 2019-10-30 NOTE — PROGRESS NOTES
Diabetic Foot Exam    Patient's shoes and socks removed  Right Foot/Ankle   Right Foot Inspection  Skin Exam: skin normal and skin intact no dry skin, no warmth, no callus, no erythema, no maceration, no abnormal color, no pre-ulcer, no ulcer and no callus                          Toe Exam: ROM and strength within normal limits  Sensory   Vibration: intact  Proprioception: intact   Monofilament testing: absent  Vascular  Capillary refills: < 3 seconds  The right DP pulse is 2+  The right PT pulse is 2+  Left Foot/Ankle  Left Foot Inspection  Skin Exam: skin normal and skin intactno dry skin, no warmth, no erythema, no maceration, normal color, no pre-ulcer, no ulcer and no callus                         Toe Exam: ROM and strength within normal limits                   Sensory   Vibration: intact  Proprioception: intact  Monofilament: absent  Vascular  Capillary refills: < 3 seconds  The left DP pulse is 2+  The left PT pulse is 2+  Assign Risk Category:  No deformity present; Loss of protective sensation;  No weak pulses       Risk: 0

## 2020-01-06 DIAGNOSIS — E11.8 TYPE 2 DIABETES MELLITUS WITH COMPLICATION (HCC): ICD-10-CM

## 2020-01-07 ENCOUNTER — TELEPHONE (OUTPATIENT)
Dept: FAMILY MEDICINE CLINIC | Facility: CLINIC | Age: 60
End: 2020-01-07

## 2020-01-07 DIAGNOSIS — E11.8 TYPE 2 DIABETES MELLITUS WITH COMPLICATION (HCC): ICD-10-CM

## 2020-01-07 RX ORDER — HUMAN INSULIN 100 [IU]/ML
70 INJECTION, SUSPENSION SUBCUTANEOUS 2 TIMES DAILY
Qty: 50 ML | Refills: 5 | Status: SHIPPED | OUTPATIENT
Start: 2020-01-07 | End: 2020-01-28 | Stop reason: SDUPTHER

## 2020-01-07 RX ORDER — HUMAN INSULIN 100 [IU]/ML
INJECTION, SUSPENSION SUBCUTANEOUS
Qty: 40 ML | Refills: 0 | Status: SHIPPED | OUTPATIENT
Start: 2020-01-07 | End: 2020-01-07 | Stop reason: SDUPTHER

## 2020-01-07 NOTE — TELEPHONE ENCOUNTER
Patient called asking for refills on his insulin but is requesting 5 vials instead of 4 due to running out

## 2020-01-16 LAB
ALBUMIN SERPL-MCNC: 4.4 G/DL (ref 3.6–5.1)
ALBUMIN/GLOB SERPL: 1.4 (CALC) (ref 1–2.5)
ALP SERPL-CCNC: 50 U/L (ref 40–115)
ALT SERPL-CCNC: 45 U/L (ref 9–46)
AST SERPL-CCNC: 37 U/L (ref 10–35)
BILIRUB SERPL-MCNC: 0.3 MG/DL (ref 0.2–1.2)
BUN SERPL-MCNC: 21 MG/DL (ref 7–25)
BUN/CREAT SERPL: ABNORMAL (CALC) (ref 6–22)
CALCIUM SERPL-MCNC: 9.8 MG/DL (ref 8.6–10.3)
CHLORIDE SERPL-SCNC: 99 MMOL/L (ref 98–110)
CHOLEST SERPL-MCNC: 141 MG/DL
CHOLEST/HDLC SERPL: 2.7 (CALC)
CO2 SERPL-SCNC: 28 MMOL/L (ref 20–32)
CREAT SERPL-MCNC: 1.32 MG/DL (ref 0.7–1.33)
EST. AVERAGE GLUCOSE BLD GHB EST-MCNC: 258 (CALC)
EST. AVERAGE GLUCOSE BLD GHB EST-SCNC: 14.3 (CALC)
GLOBULIN SER CALC-MCNC: 3.1 G/DL (CALC) (ref 1.9–3.7)
GLUCOSE SERPL-MCNC: 263 MG/DL (ref 65–99)
HBA1C MFR BLD: 10.6 % OF TOTAL HGB
HDLC SERPL-MCNC: 53 MG/DL
LDLC SERPL CALC-MCNC: 72 MG/DL (CALC)
NONHDLC SERPL-MCNC: 88 MG/DL (CALC)
POTASSIUM SERPL-SCNC: 5.6 MMOL/L (ref 3.5–5.3)
PROT SERPL-MCNC: 7.5 G/DL (ref 6.1–8.1)
SL AMB EGFR AFRICAN AMERICAN: 68 ML/MIN/1.73M2
SL AMB EGFR NON AFRICAN AMERICAN: 59 ML/MIN/1.73M2
SODIUM SERPL-SCNC: 137 MMOL/L (ref 135–146)
TRIGL SERPL-MCNC: 76 MG/DL
TSH SERPL-ACNC: 3.21 MIU/L (ref 0.4–4.5)

## 2020-01-28 ENCOUNTER — OFFICE VISIT (OUTPATIENT)
Dept: FAMILY MEDICINE CLINIC | Facility: CLINIC | Age: 60
End: 2020-01-28
Payer: COMMERCIAL

## 2020-01-28 ENCOUNTER — TELEPHONE (OUTPATIENT)
Dept: FAMILY MEDICINE CLINIC | Facility: CLINIC | Age: 60
End: 2020-01-28

## 2020-01-28 VITALS
OXYGEN SATURATION: 95 % | SYSTOLIC BLOOD PRESSURE: 126 MMHG | HEIGHT: 70 IN | WEIGHT: 260 LBS | DIASTOLIC BLOOD PRESSURE: 76 MMHG | BODY MASS INDEX: 37.22 KG/M2 | HEART RATE: 100 BPM

## 2020-01-28 DIAGNOSIS — I10 ESSENTIAL HYPERTENSION: Primary | ICD-10-CM

## 2020-01-28 DIAGNOSIS — E11.8 TYPE 2 DIABETES MELLITUS WITH COMPLICATION (HCC): ICD-10-CM

## 2020-01-28 DIAGNOSIS — E78.2 MIXED HYPERLIPIDEMIA: ICD-10-CM

## 2020-01-28 PROCEDURE — 3078F DIAST BP <80 MM HG: CPT | Performed by: FAMILY MEDICINE

## 2020-01-28 PROCEDURE — 3074F SYST BP LT 130 MM HG: CPT | Performed by: FAMILY MEDICINE

## 2020-01-28 PROCEDURE — 3008F BODY MASS INDEX DOCD: CPT | Performed by: FAMILY MEDICINE

## 2020-01-28 PROCEDURE — 99214 OFFICE O/P EST MOD 30 MIN: CPT | Performed by: FAMILY MEDICINE

## 2020-01-28 RX ORDER — HUMAN INSULIN 100 [IU]/ML
75 INJECTION, SUSPENSION SUBCUTANEOUS
Qty: 60 ML | Refills: 5 | Status: SHIPPED | OUTPATIENT
Start: 2020-01-28 | End: 2020-01-29 | Stop reason: SDUPTHER

## 2020-01-28 RX ORDER — HUMAN INSULIN 100 [IU]/ML
70 INJECTION, SUSPENSION SUBCUTANEOUS 2 TIMES DAILY
Qty: 60 ML | Refills: 5 | Status: SHIPPED | OUTPATIENT
Start: 2020-01-28 | End: 2020-01-28 | Stop reason: SDUPTHER

## 2020-01-28 NOTE — PROGRESS NOTES
Assessment/Plan:    Diabetic polyneuropathy associated with type 2 diabetes mellitus ([facility] 75 )    Lab Results   Component Value Date    HGBA1C 10 6 (H) 01/15/2020       Hypertension  Stable  Continue current  Hyperlipidemia  Stable  Continue current  Diagnoses and all orders for this visit:    Essential hypertension    Type 2 diabetes mellitus with complication ([place] [address] )  -     Discontinue: NOVOLIN 70/30 RELION (70-30) 100 UNIT/ML subcutaneous injection; Inject 70 Units under the skin 2 (two) times a day  -     NOVOLIN 70/30 RELION (70-30) 100 UNIT/ML subcutaneous injection; Inject 75 Units under the skin 2 (two) times a day before meals    Mixed hyperlipidemia          Subjective:      Patient ID: Xuan Mcintosh is a 63 y o  male  He has T2DM  He is on 70 units of Novolog 70/30 twice daily  He has no hypoglycemia  He is also taking 1000 mg of metformin and 10 mg of glipizide twice daily  His blood pressure is at goal on his current regimen which includes lisinopril  He has no cough  He has no chest pain or shortness of breath  He has no headache or vision changes  He has no PND, orthopnea, or dyspnea on exertion  He is on high-intensity statin therapy  He has no increased myalgia or muscle weakness  His liver function testing is within normal limits  The following portions of the patient's history were reviewed and updated as appropriate:   He  has no past medical history on file  He   Patient Active Problem List    Diagnosis Date Noted    Medicare annual wellness visit, initial 07/31/2019    Diabetic polyneuropathy associated with type 2 diabetes mellitus ([facility] ) 03/29/2019    Diabetes ([place] [address] ) 09/28/2018    Hyperlipidemia 09/28/2018    Hypertension 09/28/2018     He  has a past surgical history that includes Appendectomy; Back surgery; Elbow surgery; Tonsillectomy; and Wrist surgery    His family history includes Diabetes in his father and mother; Heart disease in his mother; Stroke in his brother and sister  He  reports that he has never smoked  He has never used smokeless tobacco  His alcohol and drug histories are not on file  Current Outpatient Medications   Medication Sig Dispense Refill    atorvastatin (LIPITOR) 40 mg tablet Take 1 tablet (40 mg total) by mouth daily 90 tablet 3    DULoxetine (CYMBALTA) 30 mg delayed release capsule Take 1 capsule (30 mg total) by mouth daily 30 capsule 5    gabapentin (NEURONTIN) 300 mg capsule Take 1 capsule (300 mg total) by mouth 3 (three) times a day 90 capsule 5    gabapentin (NEURONTIN) 300 mg capsule TAKE 2 CAPSULES BY MOUTH 3 TIMES A  capsule 1    glipiZIDE-metFORMIN (METAGLIP) 5-500 MG per tablet TAKE 2 TABLETS BY MOUTH TWO TIMES A DAY WITH MEALS 360 tablet 1    ibuprofen (MOTRIN) 800 mg tablet Take 1 tablet (800 mg total) by mouth every 6 (six) hours as needed for mild pain 120 tablet 5    lisinopril (ZESTRIL) 10 mg tablet Take 1 tablet (10 mg total) by mouth daily 90 tablet 3    NOVOLIN 70/30 RELION (70-30) 100 UNIT/ML subcutaneous injection Inject 75 Units under the skin 2 (two) times a day before meals 60 mL 5    gabapentin (NEURONTIN) 300 mg capsule Take 2 capsules (600 mg total) by mouth 3 (three) times a day (Patient not taking: Reported on 10/30/2019) 180 capsule 5     No current facility-administered medications for this visit        Current Outpatient Medications on File Prior to Visit   Medication Sig    atorvastatin (LIPITOR) 40 mg tablet Take 1 tablet (40 mg total) by mouth daily    DULoxetine (CYMBALTA) 30 mg delayed release capsule Take 1 capsule (30 mg total) by mouth daily    gabapentin (NEURONTIN) 300 mg capsule Take 1 capsule (300 mg total) by mouth 3 (three) times a day    gabapentin (NEURONTIN) 300 mg capsule TAKE 2 CAPSULES BY MOUTH 3 TIMES A DAY    glipiZIDE-metFORMIN (METAGLIP) 5-500 MG per tablet TAKE 2 TABLETS BY MOUTH TWO TIMES A DAY WITH MEALS    ibuprofen (MOTRIN) 800 mg tablet Take 1 tablet (800 mg total) by mouth every 6 (six) hours as needed for mild pain    lisinopril (ZESTRIL) 10 mg tablet Take 1 tablet (10 mg total) by mouth daily    [DISCONTINUED] NOVOLIN 70/30 RELION (70-30) 100 UNIT/ML subcutaneous injection Inject 70 Units under the skin 2 (two) times a day    gabapentin (NEURONTIN) 300 mg capsule Take 2 capsules (600 mg total) by mouth 3 (three) times a day (Patient not taking: Reported on 10/30/2019)     No current facility-administered medications on file prior to visit  He has No Known Allergies       Review of Systems   All other systems reviewed and are negative  Objective:      /76   Pulse 100   Ht 5' 10" (1 778 m)   Wt 118 kg (260 lb)   SpO2 95%   BMI 37 31 kg/m²          Physical Exam   Constitutional: He is oriented to person, place, and time  He appears well-developed and well-nourished  Neck: Normal range of motion  Neck supple  Cardiovascular: Normal rate, regular rhythm, normal heart sounds and intact distal pulses  Pulmonary/Chest: Effort normal and breath sounds normal    Abdominal: Soft  Bowel sounds are normal    Musculoskeletal: Normal range of motion  Neurological: He is alert and oriented to person, place, and time  Skin: Skin is warm and dry  Capillary refill takes less than 2 seconds  Psychiatric: He has a normal mood and affect  His behavior is normal  Judgment and thought content normal    Nursing note and vitals reviewed

## 2020-01-28 NOTE — PATIENT INSTRUCTIONS
Your A1c is not at goal   Let's increase your insulin to 75 units twice a day  Otherwise, your other labs look good  Your kidneys, liver, and electrolytes are all normal   Your blood pressure is at goal as well

## 2020-01-28 NOTE — TELEPHONE ENCOUNTER
Patient called stating that he needs a new script for Novolog 70/30 to written for 28 days with 5 vials due to it will cost $124

## 2020-01-29 DIAGNOSIS — E11.8 TYPE 2 DIABETES MELLITUS WITH COMPLICATION (HCC): ICD-10-CM

## 2020-01-29 RX ORDER — HUMAN INSULIN 100 [IU]/ML
75 INJECTION, SUSPENSION SUBCUTANEOUS
Qty: 60 ML | Refills: 5 | Status: CANCELLED | OUTPATIENT
Start: 2020-01-29

## 2020-01-29 RX ORDER — HUMAN INSULIN 100 [IU]/ML
INJECTION, SUSPENSION SUBCUTANEOUS
Qty: 60 ML | Refills: 5 | Status: SHIPPED | OUTPATIENT
Start: 2020-01-29 | End: 2020-01-31 | Stop reason: SDUPTHER

## 2020-01-29 NOTE — TELEPHONE ENCOUNTER
Patient called he needs a 30 day supply with 5 refills sent to [address] [place] [address] and he said he takes 75 units (sometimes more) depending on what his sugar reading is but needs it to say test 2x a day or as need and is able to take  Extra insulin as needed  This is the only according to the pharmacy that he can get it filled for how much he needs

## 2020-01-31 DIAGNOSIS — E11.8 TYPE 2 DIABETES MELLITUS WITH COMPLICATION (HCC): ICD-10-CM

## 2020-01-31 RX ORDER — HUMAN INSULIN 100 [IU]/ML
INJECTION, SUSPENSION SUBCUTANEOUS
Qty: 60 ML | Refills: 5 | Status: SHIPPED | OUTPATIENT
Start: 2020-01-31 | End: 2020-02-04 | Stop reason: SDUPTHER

## 2020-02-04 DIAGNOSIS — E11.8 TYPE 2 DIABETES MELLITUS WITH COMPLICATION (HCC): ICD-10-CM

## 2020-02-04 RX ORDER — HUMAN INSULIN 100 [IU]/ML
INJECTION, SUSPENSION SUBCUTANEOUS
Qty: 60 ML | Refills: 5 | Status: SHIPPED | OUTPATIENT
Start: 2020-02-04 | End: 2020-02-05 | Stop reason: SDUPTHER

## 2020-02-05 DIAGNOSIS — E11.8 TYPE 2 DIABETES MELLITUS WITH COMPLICATION (HCC): ICD-10-CM

## 2020-02-05 RX ORDER — HUMAN INSULIN 100 [IU]/ML
INJECTION, SUSPENSION SUBCUTANEOUS
Qty: 50 ML | Refills: 11 | Status: SHIPPED | OUTPATIENT
Start: 2020-02-05 | End: 2021-02-10

## 2020-02-05 NOTE — TELEPHONE ENCOUNTER
Patient called insurance company will only pay for 5 bottles-28 a day supply please send to [facility]

## 2020-02-27 DIAGNOSIS — E11.8 TYPE 2 DIABETES MELLITUS WITH COMPLICATION (HCC): ICD-10-CM

## 2020-02-27 DIAGNOSIS — E78.5 HYPERLIPIDEMIA, UNSPECIFIED HYPERLIPIDEMIA TYPE: ICD-10-CM

## 2020-02-27 RX ORDER — LISINOPRIL 10 MG/1
TABLET ORAL
Qty: 90 TABLET | Refills: 3 | Status: SHIPPED | OUTPATIENT
Start: 2020-02-27 | End: 2021-05-18

## 2020-02-27 RX ORDER — ATORVASTATIN CALCIUM 40 MG/1
TABLET, FILM COATED ORAL
Qty: 90 TABLET | Refills: 3 | Status: SHIPPED | OUTPATIENT
Start: 2020-02-27 | End: 2020-05-21

## 2020-02-28 DIAGNOSIS — E11.8 TYPE 2 DIABETES MELLITUS WITH COMPLICATION (HCC): ICD-10-CM

## 2020-02-28 RX ORDER — GABAPENTIN 300 MG/1
CAPSULE ORAL
Qty: 540 CAPSULE | Refills: 1 | Status: SHIPPED | OUTPATIENT
Start: 2020-02-28 | End: 2020-09-04

## 2020-02-28 RX ORDER — GLIPIZIDE AND METFORMIN HCL 5; 500 MG/1; MG/1
TABLET, FILM COATED ORAL
Qty: 360 TABLET | Refills: 1 | Status: SHIPPED | OUTPATIENT
Start: 2020-02-28 | End: 2020-09-04

## 2020-04-29 ENCOUNTER — TELEMEDICINE (OUTPATIENT)
Dept: FAMILY MEDICINE CLINIC | Facility: CLINIC | Age: 60
End: 2020-04-29
Payer: COMMERCIAL

## 2020-04-29 DIAGNOSIS — E11.42 DIABETIC POLYNEUROPATHY ASSOCIATED WITH TYPE 2 DIABETES MELLITUS (HCC): Primary | ICD-10-CM

## 2020-04-29 DIAGNOSIS — E78.2 MIXED HYPERLIPIDEMIA: ICD-10-CM

## 2020-04-29 DIAGNOSIS — I10 ESSENTIAL HYPERTENSION: ICD-10-CM

## 2020-04-29 PROCEDURE — 99442 PR PHYS/QHP TELEPHONE EVALUATION 11-20 MIN: CPT | Performed by: STUDENT IN AN ORGANIZED HEALTH CARE EDUCATION/TRAINING PROGRAM

## 2020-05-21 DIAGNOSIS — E78.5 HYPERLIPIDEMIA, UNSPECIFIED HYPERLIPIDEMIA TYPE: ICD-10-CM

## 2020-05-21 RX ORDER — ATORVASTATIN CALCIUM 40 MG/1
TABLET, FILM COATED ORAL
Qty: 90 TABLET | Refills: 3 | Status: SHIPPED | OUTPATIENT
Start: 2020-05-21 | End: 2021-05-18

## 2020-07-07 DIAGNOSIS — R52 PAIN: ICD-10-CM

## 2020-07-07 RX ORDER — IBUPROFEN 800 MG/1
800 TABLET ORAL EVERY 6 HOURS PRN
Qty: 120 TABLET | Refills: 5 | Status: SHIPPED | OUTPATIENT
Start: 2020-07-07 | End: 2021-04-21

## 2020-09-04 DIAGNOSIS — E11.8 TYPE 2 DIABETES MELLITUS WITH COMPLICATION (HCC): ICD-10-CM

## 2020-09-04 RX ORDER — GABAPENTIN 300 MG/1
CAPSULE ORAL
Qty: 540 CAPSULE | Refills: 1 | Status: SHIPPED | OUTPATIENT
Start: 2020-09-04 | End: 2021-03-01

## 2020-09-04 RX ORDER — GLIPIZIDE AND METFORMIN HCL 5; 500 MG/1; MG/1
TABLET, FILM COATED ORAL
Qty: 360 TABLET | Refills: 1 | Status: SHIPPED | OUTPATIENT
Start: 2020-09-04 | End: 2021-03-01

## 2020-10-28 LAB
ALBUMIN SERPL-MCNC: 4.4 G/DL (ref 3.6–5.1)
ALBUMIN/CREAT UR: 79 MCG/MG CREAT
ALBUMIN/GLOB SERPL: 1.4 (CALC) (ref 1–2.5)
ALP SERPL-CCNC: 45 U/L (ref 35–144)
ALT SERPL-CCNC: 44 U/L (ref 9–46)
AST SERPL-CCNC: 35 U/L (ref 10–35)
BILIRUB SERPL-MCNC: 0.3 MG/DL (ref 0.2–1.2)
BUN SERPL-MCNC: 21 MG/DL (ref 7–25)
BUN/CREAT SERPL: 15 (CALC) (ref 6–22)
CALCIUM SERPL-MCNC: 9.9 MG/DL (ref 8.6–10.3)
CHLORIDE SERPL-SCNC: 100 MMOL/L (ref 98–110)
CO2 SERPL-SCNC: 30 MMOL/L (ref 20–32)
CREAT SERPL-MCNC: 1.42 MG/DL (ref 0.7–1.33)
CREAT UR-MCNC: 140 MG/DL (ref 20–320)
EST. AVERAGE GLUCOSE BLD GHB EST-MCNC: 217 (CALC)
EST. AVERAGE GLUCOSE BLD GHB EST-SCNC: 12 (CALC)
GLOBULIN SER CALC-MCNC: 3.1 G/DL (CALC) (ref 1.9–3.7)
GLUCOSE SERPL-MCNC: 200 MG/DL (ref 65–99)
HBA1C MFR BLD: 9.2 % OF TOTAL HGB
MICROALBUMIN UR-MCNC: 11 MG/DL
POTASSIUM SERPL-SCNC: 5.9 MMOL/L (ref 3.5–5.3)
PROT SERPL-MCNC: 7.5 G/DL (ref 6.1–8.1)
SL AMB EGFR AFRICAN AMERICAN: 62 ML/MIN/1.73M2
SL AMB EGFR NON AFRICAN AMERICAN: 54 ML/MIN/1.73M2
SODIUM SERPL-SCNC: 138 MMOL/L (ref 135–146)

## 2020-11-06 ENCOUNTER — OFFICE VISIT (OUTPATIENT)
Dept: FAMILY MEDICINE CLINIC | Facility: CLINIC | Age: 60
End: 2020-11-06
Payer: COMMERCIAL

## 2020-11-06 VITALS
WEIGHT: 263 LBS | SYSTOLIC BLOOD PRESSURE: 132 MMHG | TEMPERATURE: 98.8 F | DIASTOLIC BLOOD PRESSURE: 76 MMHG | BODY MASS INDEX: 37.65 KG/M2 | OXYGEN SATURATION: 96 % | HEIGHT: 70 IN | HEART RATE: 91 BPM

## 2020-11-06 DIAGNOSIS — E78.5 HYPERLIPIDEMIA, UNSPECIFIED HYPERLIPIDEMIA TYPE: ICD-10-CM

## 2020-11-06 DIAGNOSIS — I10 ESSENTIAL HYPERTENSION: ICD-10-CM

## 2020-11-06 DIAGNOSIS — Z23 NEED FOR VACCINATION AGAINST STREPTOCOCCUS PNEUMONIAE: ICD-10-CM

## 2020-11-06 DIAGNOSIS — E11.8 TYPE 2 DIABETES MELLITUS WITH COMPLICATION (HCC): ICD-10-CM

## 2020-11-06 DIAGNOSIS — Z23 NEED FOR IMMUNIZATION AGAINST INFLUENZA: Primary | ICD-10-CM

## 2020-11-06 DIAGNOSIS — E11.42 DIABETIC POLYNEUROPATHY ASSOCIATED WITH TYPE 2 DIABETES MELLITUS (HCC): ICD-10-CM

## 2020-11-06 DIAGNOSIS — M24.542 CONTRACTURE OF FINGER JOINT, LEFT: ICD-10-CM

## 2020-11-06 PROCEDURE — G0009 ADMIN PNEUMOCOCCAL VACCINE: HCPCS

## 2020-11-06 PROCEDURE — 90682 RIV4 VACC RECOMBINANT DNA IM: CPT

## 2020-11-06 PROCEDURE — G0008 ADMIN INFLUENZA VIRUS VAC: HCPCS

## 2020-11-06 PROCEDURE — 90670 PCV13 VACCINE IM: CPT

## 2020-11-06 PROCEDURE — 99214 OFFICE O/P EST MOD 30 MIN: CPT | Performed by: FAMILY MEDICINE

## 2021-01-11 ENCOUNTER — VBI (OUTPATIENT)
Dept: ADMINISTRATIVE | Facility: OTHER | Age: 61
End: 2021-01-11

## 2021-01-16 LAB
ALBUMIN SERPL-MCNC: 4.4 G/DL (ref 3.6–5.1)
ALBUMIN/GLOB SERPL: 1.4 (CALC) (ref 1–2.5)
ALP SERPL-CCNC: 52 U/L (ref 35–144)
ALT SERPL-CCNC: 40 U/L (ref 9–46)
AST SERPL-CCNC: 30 U/L (ref 10–35)
BILIRUB SERPL-MCNC: 0.3 MG/DL (ref 0.2–1.2)
BUN SERPL-MCNC: 32 MG/DL (ref 7–25)
BUN/CREAT SERPL: 22 (CALC) (ref 6–22)
CALCIUM SERPL-MCNC: 10 MG/DL (ref 8.6–10.3)
CHLORIDE SERPL-SCNC: 102 MMOL/L (ref 98–110)
CO2 SERPL-SCNC: 27 MMOL/L (ref 20–32)
CREAT SERPL-MCNC: 1.43 MG/DL (ref 0.7–1.33)
EST. AVERAGE GLUCOSE BLD GHB EST-MCNC: 220 (CALC)
EST. AVERAGE GLUCOSE BLD GHB EST-SCNC: 12.2 (CALC)
GLOBULIN SER CALC-MCNC: 3.2 G/DL (CALC) (ref 1.9–3.7)
GLUCOSE SERPL-MCNC: 177 MG/DL (ref 65–99)
HBA1C MFR BLD: 9.3 % OF TOTAL HGB
POTASSIUM SERPL-SCNC: 5.4 MMOL/L (ref 3.5–5.3)
PROT SERPL-MCNC: 7.6 G/DL (ref 6.1–8.1)
SL AMB EGFR AFRICAN AMERICAN: 62 ML/MIN/1.73M2
SL AMB EGFR NON AFRICAN AMERICAN: 53 ML/MIN/1.73M2
SODIUM SERPL-SCNC: 136 MMOL/L (ref 135–146)

## 2021-02-05 ENCOUNTER — OFFICE VISIT (OUTPATIENT)
Dept: FAMILY MEDICINE CLINIC | Facility: CLINIC | Age: 61
End: 2021-02-05
Payer: COMMERCIAL

## 2021-02-05 VITALS
HEART RATE: 97 BPM | TEMPERATURE: 98.1 F | OXYGEN SATURATION: 91 % | DIASTOLIC BLOOD PRESSURE: 70 MMHG | SYSTOLIC BLOOD PRESSURE: 146 MMHG | BODY MASS INDEX: 38.37 KG/M2 | WEIGHT: 268 LBS | HEIGHT: 70 IN

## 2021-02-05 DIAGNOSIS — Z79.4 TYPE 2 DIABETES MELLITUS WITH DIABETIC POLYNEUROPATHY, WITH LONG-TERM CURRENT USE OF INSULIN (HCC): Primary | ICD-10-CM

## 2021-02-05 DIAGNOSIS — E11.42 TYPE 2 DIABETES MELLITUS WITH DIABETIC POLYNEUROPATHY, WITH LONG-TERM CURRENT USE OF INSULIN (HCC): Primary | ICD-10-CM

## 2021-02-05 DIAGNOSIS — E78.2 MIXED HYPERLIPIDEMIA: ICD-10-CM

## 2021-02-05 DIAGNOSIS — I10 ESSENTIAL HYPERTENSION: ICD-10-CM

## 2021-02-05 DIAGNOSIS — E11.42 DIABETIC POLYNEUROPATHY ASSOCIATED WITH TYPE 2 DIABETES MELLITUS (HCC): ICD-10-CM

## 2021-02-05 PROCEDURE — 99214 OFFICE O/P EST MOD 30 MIN: CPT | Performed by: FAMILY MEDICINE

## 2021-02-05 NOTE — PROGRESS NOTES
Assessment and Plan:     Problem List Items Addressed This Visit     None           Preventive health issues were discussed with patient, and age appropriate screening tests were ordered as noted in patient's After Visit Summary  Personalized health advice and appropriate referrals for health education or preventive services given if needed, as noted in patient's After Visit Summary  History of Present Illness:     Patient presents for Medicare Annual Wellness visit    Patient Care Team:  Coleen Posada MD as PCP - General     Problem List:     Patient Active Problem List   Diagnosis    Diabetes ([facility] )    Hyperlipidemia    Hypertension    Diabetic polyneuropathy associated with type 2 diabetes mellitus ([facility] )    Medicare annual wellness visit, initial      Past Medical and Surgical History:     No past medical history on file    Past Surgical History:   Procedure Laterality Date    APPENDECTOMY      BACK SURGERY      ELBOW SURGERY      TONSILLECTOMY      WRIST SURGERY        Family History:     Family History   Problem Relation Age of Onset    Heart disease Mother         cardiac    Diabetes Mother    [facility] Diabetes Father     Stroke Sister     Stroke Brother       Social History:        Social History     Socioeconomic History    Marital status: /Civil Union     Spouse name: Not on file    Number of children: Not on file    Years of education: Not on file    Highest education level: Not on file   Occupational History    Not on file   Social Needs    Financial resource strain: Not on file    Food insecurity     Worry: Not on file     Inability: Not on file   [employer] needs     Medical: Not on file     Non-medical: Not on file   Tobacco Use    Smoking status: Never Smoker    Smokeless tobacco: Never Used   Substance and Sexual Activity    Alcohol use: Not on file    Drug use: Not on file    Sexual activity: Not on file   Lifestyle    Physical activity     Days per week: Not on file     Minutes per session: Not on file    Stress: Not on file   Relationships    Social connections     Talks on phone: Not on file     Gets together: Not on file     Attends Jew service: Not on file     Active member of club or organization: Not on file     Attends meetings of clubs or organizations: Not on file     Relationship status: Not on file    Intimate partner violence     Fear of current or ex partner: Not on file     Emotionally abused: Not on file     Physically abused: Not on file     Forced sexual activity: Not on file   Other Topics Concern    Not on file   Social History Narrative    Not on file      Medications and Allergies:     Current Outpatient Medications   Medication Sig Dispense Refill    atorvastatin (LIPITOR) 40 mg tablet take 1 tablet by mouth once daily 90 tablet 3    DULoxetine (CYMBALTA) 30 mg delayed release capsule Take 1 capsule (30 mg total) by mouth daily 30 capsule 5    gabapentin (NEURONTIN) 300 mg capsule Take 1 capsule (300 mg total) by mouth 3 (three) times a day 90 capsule 5    gabapentin (NEURONTIN) 300 mg capsule Take 2 capsules (600 mg total) by mouth 3 (three) times a day (Patient not taking: Reported on 10/30/2019) 180 capsule 5    gabapentin (NEURONTIN) 300 mg capsule TAKE 2 CAPSULES BY MOUTH 3 TIMES A  capsule 1    glipiZIDE-metFORMIN (METAGLIP) 5-500 MG per tablet TAKE 2 TABLETS BY MOUTH TWO TIMES A DAY WITH MEALS 360 tablet 1    ibuprofen (MOTRIN) 800 mg tablet Take 1 tablet (800 mg total) by mouth every 6 (six) hours as needed for mild pain 120 tablet 5    lisinopril (ZESTRIL) 10 mg tablet take 1 tablet by mouth once daily 90 tablet 3    NOVOLIN 70/30 RELION (70-30) 100 UNIT/ML subcutaneous injection Take 75 units subcutaneously twice daily with the option to take 80 units twice daily with high blood sugars  50 mL 11     No current facility-administered medications for this visit        No Known Allergies   Immunizations: Immunization History   Administered Date(s) Administered    INFLUENZA 01/18/2018    Influenza Quadrivalent Preservative Free 3 years and older IM 01/18/2018    Influenza, recombinant, quadrivalent,injectable, preservative free 09/28/2018, 10/30/2019, 11/06/2020    Pneumococcal Conjugate 13-Valent 11/06/2020    Pneumococcal Polysaccharide PPV23 09/28/2018      Health Maintenance:         Topic Date Due    Hepatitis C Screening  1960    HIV Screening  06/09/1976    Colorectal Cancer Screening  06/09/2011         Topic Date Due    DTaP,Tdap,and Td Vaccines (1 - Tdap) 06/09/1982      Medicare Health Risk Assessment:     There were no vitals taken for this visit  Demetris Blair is here for his Initial Wellness visit  Health Risk Assessment:   Patient rates overall health as good  Patient feels that their physical health rating is same  Eyesight was rated as slightly worse  Hearing was rated as slightly worse  Patient feels that their emotional and mental health rating is same  Pain experienced in the last 7 days has been some  Patient's pain rating has been 7/10  Patient states that he has experienced no weight loss or gain in last 6 months  Depression Screening:   PHQ-2 Score: 0      Nutrition:   Current diet is Low Saturated Fat       PREVENTIVE SCREENINGS      Cardiovascular Screening:    General: Screening Not Indicated and History Lipid Disorder      Diabetes Screening:     General: Screening Not Indicated and History Diabetes      Lung Cancer Screening:     General: Screening Not Indicated      Juan Francisco Munguia MD

## 2021-02-05 NOTE — PROGRESS NOTES
Assessment/Plan:    No problem-specific Assessment & Plan notes found for this encounter  There are no diagnoses linked to this encounter  Subjective:      Patient ID: Vania Del Real is a 62 y o  male  He has T2DM  His A1c is not at goal   He has no side effects or hypoglycemia  His BP is not at goal  He has no CP or SOB  He has no HA or vision changes  He has no PND or orthopnea  He is taking high-intensity statin therapy  His LFTs are normal and he has no myalgia or muscle weakness  He refuses to take part in the Medicare Wellness visit  He declines colon cancer screening  He is UTD with influenza and pneumovax  The following portions of the patient's history were reviewed and updated as appropriate:   He  has no past medical history on file  He   Patient Active Problem List    Diagnosis Date Noted    Medicare annual wellness visit, initial 07/31/2019    Diabetic polyneuropathy associated with type 2 diabetes mellitus ([facility] ) 03/29/2019    Diabetes ([facility] ) 09/28/2018    Hyperlipidemia 09/28/2018    Hypertension 09/28/2018     He  has a past surgical history that includes Appendectomy; Back surgery; Elbow surgery; Tonsillectomy; and Wrist surgery  His family history includes Diabetes in his father and mother; Heart disease in his mother; Stroke in his brother and sister  He  reports that he has never smoked  He has never used smokeless tobacco  No history on file for alcohol and drug    Current Outpatient Medications   Medication Sig Dispense Refill    atorvastatin (LIPITOR) 40 mg tablet take 1 tablet by mouth once daily 90 tablet 3    DULoxetine (CYMBALTA) 30 mg delayed release capsule Take 1 capsule (30 mg total) by mouth daily 30 capsule 5    gabapentin (NEURONTIN) 300 mg capsule Take 2 capsules (600 mg total) by mouth 3 (three) times a day 180 capsule 5    gabapentin (NEURONTIN) 300 mg capsule TAKE 2 CAPSULES BY MOUTH 3 TIMES A  capsule 1    glipiZIDE-metFORMIN (METAGLIP) 5-500 MG per tablet TAKE 2 TABLETS BY MOUTH TWO TIMES A DAY WITH MEALS 360 tablet 1    ibuprofen (MOTRIN) 800 mg tablet Take 1 tablet (800 mg total) by mouth every 6 (six) hours as needed for mild pain 120 tablet 5    lisinopril (ZESTRIL) 10 mg tablet take 1 tablet by mouth once daily 90 tablet 3    NOVOLIN 70/30 RELION (70-30) 100 UNIT/ML subcutaneous injection Take 75 units subcutaneously twice daily with the option to take 80 units twice daily with high blood sugars  50 mL 11    gabapentin (NEURONTIN) 300 mg capsule Take 1 capsule (300 mg total) by mouth 3 (three) times a day (Patient not taking: Reported on 2/5/2021) 90 capsule 5     No current facility-administered medications for this visit  Current Outpatient Medications on File Prior to Visit   Medication Sig    atorvastatin (LIPITOR) 40 mg tablet take 1 tablet by mouth once daily    DULoxetine (CYMBALTA) 30 mg delayed release capsule Take 1 capsule (30 mg total) by mouth daily    gabapentin (NEURONTIN) 300 mg capsule Take 2 capsules (600 mg total) by mouth 3 (three) times a day    gabapentin (NEURONTIN) 300 mg capsule TAKE 2 CAPSULES BY MOUTH 3 TIMES A DAY    glipiZIDE-metFORMIN (METAGLIP) 5-500 MG per tablet TAKE 2 TABLETS BY MOUTH TWO TIMES A DAY WITH MEALS    ibuprofen (MOTRIN) 800 mg tablet Take 1 tablet (800 mg total) by mouth every 6 (six) hours as needed for mild pain    lisinopril (ZESTRIL) 10 mg tablet take 1 tablet by mouth once daily    NOVOLIN 70/30 RELION (70-30) 100 UNIT/ML subcutaneous injection Take 75 units subcutaneously twice daily with the option to take 80 units twice daily with high blood sugars   gabapentin (NEURONTIN) 300 mg capsule Take 1 capsule (300 mg total) by mouth 3 (three) times a day (Patient not taking: Reported on 2/5/2021)     No current facility-administered medications on file prior to visit  He has No Known Allergies       Review of Systems   All other systems reviewed and are negative  Objective:      /70 (BP Location: Left arm, Patient Position: Sitting, Cuff Size: Large)   Pulse 97   Temp 98 1 °F (36 7 °C) (Temporal)   Ht 5' 10" (1 778 m)   Wt 122 kg (268 lb)   SpO2 91%   BMI 38 45 kg/m²          Physical Exam  Vitals signs and nursing note reviewed  Constitutional:       Appearance: Normal appearance  He is obese  HENT:      Head: Atraumatic  Neck:      Musculoskeletal: Normal range of motion and neck supple  Cardiovascular:      Rate and Rhythm: Normal rate and regular rhythm  Pulses: Normal pulses  Heart sounds: Normal heart sounds  Pulmonary:      Effort: Pulmonary effort is normal       Breath sounds: Normal breath sounds  Abdominal:      General: Abdomen is flat  Bowel sounds are normal       Palpations: Abdomen is soft  Musculoskeletal: Normal range of motion  Skin:     General: Skin is warm and dry  Capillary Refill: Capillary refill takes less than 2 seconds  Neurological:      General: No focal deficit present  Mental Status: He is alert and oriented to person, place, and time  Mental status is at baseline  Psychiatric:         Mood and Affect: Mood normal          Behavior: Behavior normal          Thought Content:  Thought content normal          Judgment: Judgment normal

## 2021-02-05 NOTE — PROGRESS NOTES
Diabetic Foot Exam    Patient's shoes and socks removed  Right Foot/Ankle   Right Foot Inspection  Skin Exam: skin normal and skin intact no dry skin, no warmth, no callus, no erythema, no maceration, no abnormal color, no pre-ulcer, no ulcer and no callus                          Toe Exam: ROM and strength within normal limits  Sensory   Vibration: intact  Proprioception: intact   Monofilament testing: intact  Vascular  Capillary refills: < 3 seconds  The right DP pulse is 2+  The right PT pulse is 2+  Left Foot/Ankle  Left Foot Inspection  Skin Exam: skin normal and skin intactno dry skin, no warmth, no erythema, no maceration, normal color, no pre-ulcer, no ulcer and no callus                         Toe Exam: ROM and strength within normal limits                   Sensory   Vibration: intact  Proprioception: intact  Monofilament: intact  Vascular  Capillary refills: elevated  The left DP pulse is 2+  The left PT pulse is 2+  Assign Risk Category:  No deformity present; No loss of protective sensation;  No weak pulses       Risk: 0

## 2021-02-10 DIAGNOSIS — E11.8 TYPE 2 DIABETES MELLITUS WITH COMPLICATION (HCC): ICD-10-CM

## 2021-02-10 RX ORDER — HUMAN INSULIN 100 [IU]/ML
INJECTION, SUSPENSION SUBCUTANEOUS
Qty: 50 ML | Refills: 0 | Status: SHIPPED | OUTPATIENT
Start: 2021-02-10 | End: 2021-03-01 | Stop reason: SDUPTHER

## 2021-02-27 DIAGNOSIS — E11.8 TYPE 2 DIABETES MELLITUS WITH COMPLICATION (HCC): ICD-10-CM

## 2021-03-01 DIAGNOSIS — E11.8 TYPE 2 DIABETES MELLITUS WITH COMPLICATION (HCC): ICD-10-CM

## 2021-03-01 RX ORDER — HUMAN INSULIN 100 [IU]/ML
INJECTION, SUSPENSION SUBCUTANEOUS
Qty: 50 ML | Refills: 3 | Status: SHIPPED | OUTPATIENT
Start: 2021-03-01 | End: 2021-06-07

## 2021-03-01 RX ORDER — GABAPENTIN 300 MG/1
CAPSULE ORAL
Qty: 540 CAPSULE | Refills: 1 | Status: SHIPPED | OUTPATIENT
Start: 2021-03-01 | End: 2021-08-06 | Stop reason: SDUPTHER

## 2021-03-01 RX ORDER — GLIPIZIDE AND METFORMIN HCL 5; 500 MG/1; MG/1
TABLET, FILM COATED ORAL
Qty: 360 TABLET | Refills: 1 | Status: SHIPPED | OUTPATIENT
Start: 2021-03-01 | End: 2021-08-06 | Stop reason: SDUPTHER

## 2021-03-05 ENCOUNTER — TELEPHONE (OUTPATIENT)
Dept: FAMILY MEDICINE CLINIC | Facility: CLINIC | Age: 61
End: 2021-03-05

## 2021-03-05 DIAGNOSIS — E11.8 TYPE 2 DIABETES MELLITUS WITH COMPLICATION (HCC): Primary | ICD-10-CM

## 2021-03-05 RX ORDER — INSULIN ASPART 100 [IU]/ML
75 INJECTION, SUSPENSION SUBCUTANEOUS
Qty: 5 PEN | Refills: 5 | Status: SHIPPED | OUTPATIENT
Start: 2021-03-05 | End: 2021-08-06 | Stop reason: SDUPTHER

## 2021-03-05 NOTE — TELEPHONE ENCOUNTER
Patient called stating that his Novolog was sent in as brand and costs too much  Asking to have it resent as generic asap due to he is out of his insulin  Also asking for 5 vials and 5 or 6 refills

## 2021-04-15 ENCOUNTER — VBI (OUTPATIENT)
Dept: ADMINISTRATIVE | Facility: OTHER | Age: 61
End: 2021-04-15

## 2021-04-20 ENCOUNTER — VBI (OUTPATIENT)
Dept: ADMINISTRATIVE | Facility: OTHER | Age: 61
End: 2021-04-20

## 2021-04-21 DIAGNOSIS — R52 PAIN: ICD-10-CM

## 2021-04-21 RX ORDER — IBUPROFEN 800 MG/1
TABLET ORAL
Qty: 120 TABLET | Refills: 5 | Status: SHIPPED | OUTPATIENT
Start: 2021-04-21 | End: 2022-01-20

## 2021-04-26 LAB
LEFT EYE DIABETIC RETINOPATHY: NORMAL
LEFT EYE DIABETIC RETINOPATHY: NORMAL
RIGHT EYE DIABETIC RETINOPATHY: NORMAL
RIGHT EYE DIABETIC RETINOPATHY: NORMAL

## 2021-05-18 DIAGNOSIS — E11.8 TYPE 2 DIABETES MELLITUS WITH COMPLICATION (HCC): ICD-10-CM

## 2021-05-18 DIAGNOSIS — E78.5 HYPERLIPIDEMIA, UNSPECIFIED HYPERLIPIDEMIA TYPE: ICD-10-CM

## 2021-05-18 RX ORDER — ATORVASTATIN CALCIUM 40 MG/1
TABLET, FILM COATED ORAL
Qty: 90 TABLET | Refills: 3 | Status: SHIPPED | OUTPATIENT
Start: 2021-05-18 | End: 2022-05-03

## 2021-05-18 RX ORDER — LISINOPRIL 10 MG/1
TABLET ORAL
Qty: 90 TABLET | Refills: 3 | Status: SHIPPED | OUTPATIENT
Start: 2021-05-18 | End: 2022-05-03

## 2021-06-04 DIAGNOSIS — E11.8 TYPE 2 DIABETES MELLITUS WITH COMPLICATION (HCC): ICD-10-CM

## 2021-06-07 RX ORDER — HUMAN INSULIN 100 [IU]/ML
INJECTION, SUSPENSION SUBCUTANEOUS
Qty: 50 ML | Refills: 0 | Status: SHIPPED | OUTPATIENT
Start: 2021-06-07 | End: 2021-07-13

## 2021-07-12 DIAGNOSIS — E11.8 TYPE 2 DIABETES MELLITUS WITH COMPLICATION (HCC): ICD-10-CM

## 2021-07-13 RX ORDER — HUMAN INSULIN 100 [IU]/ML
INJECTION, SUSPENSION SUBCUTANEOUS
Qty: 50 ML | Refills: 0 | Status: SHIPPED | OUTPATIENT
Start: 2021-07-13 | End: 2021-08-06 | Stop reason: SDUPTHER

## 2021-07-30 ENCOUNTER — RA CDI HCC (OUTPATIENT)
Dept: OTHER | Facility: HOSPITAL | Age: 61
End: 2021-07-30

## 2021-07-30 NOTE — PROGRESS NOTES
Based on clinical documentation indicated in your record, it appears that the patient may have the following conditions:    E66 01 Morbid obesity (BMI 38 45 with diabetes)     If this is correct, please document and assess at your next visit August 6th  [facility]  coding opportunities             Chart reviewed, (number of) suggestions sent to provider: 1                  Patients insurance company: [employer] (Medicare Advantage and Commercial)             [facility] [employer]  coding opportunities             Chart Reviewed * (Number of) Inbasket suggestions sent to Provider: 1                  Patients insurance company: [employer] (Medicare Advantage and Commercial)     Visit status: Patient arrived for their scheduled appointment     Provider never responded to [facility] [place]  coding request

## 2021-08-06 ENCOUNTER — OFFICE VISIT (OUTPATIENT)
Dept: FAMILY MEDICINE CLINIC | Facility: CLINIC | Age: 61
End: 2021-08-06
Payer: COMMERCIAL

## 2021-08-06 VITALS
HEART RATE: 91 BPM | DIASTOLIC BLOOD PRESSURE: 72 MMHG | WEIGHT: 264 LBS | BODY MASS INDEX: 37.8 KG/M2 | TEMPERATURE: 97.4 F | HEIGHT: 70 IN | SYSTOLIC BLOOD PRESSURE: 142 MMHG | OXYGEN SATURATION: 96 %

## 2021-08-06 DIAGNOSIS — E78.2 MIXED HYPERLIPIDEMIA: ICD-10-CM

## 2021-08-06 DIAGNOSIS — Z12.11 SCREENING FOR COLORECTAL CANCER: ICD-10-CM

## 2021-08-06 DIAGNOSIS — Z23 NEED FOR SHINGLES VACCINE: ICD-10-CM

## 2021-08-06 DIAGNOSIS — E11.42 DIABETIC POLYNEUROPATHY ASSOCIATED WITH TYPE 2 DIABETES MELLITUS (HCC): ICD-10-CM

## 2021-08-06 DIAGNOSIS — E66.01 OBESITY, MORBID (HCC): Primary | ICD-10-CM

## 2021-08-06 DIAGNOSIS — Z23 ENCOUNTER FOR IMMUNIZATION: ICD-10-CM

## 2021-08-06 DIAGNOSIS — Z12.12 SCREENING FOR COLORECTAL CANCER: ICD-10-CM

## 2021-08-06 DIAGNOSIS — Z11.4 SCREENING FOR HIV (HUMAN IMMUNODEFICIENCY VIRUS): ICD-10-CM

## 2021-08-06 DIAGNOSIS — I10 ESSENTIAL HYPERTENSION: ICD-10-CM

## 2021-08-06 DIAGNOSIS — E11.8 TYPE 2 DIABETES MELLITUS WITH COMPLICATION (HCC): ICD-10-CM

## 2021-08-06 DIAGNOSIS — Z11.59 NEED FOR HEPATITIS C SCREENING TEST: ICD-10-CM

## 2021-08-06 PROCEDURE — 99214 OFFICE O/P EST MOD 30 MIN: CPT | Performed by: FAMILY MEDICINE

## 2021-08-06 RX ORDER — ZOSTER VACCINE RECOMBINANT, ADJUVANTED 50 MCG/0.5
0.5 KIT INTRAMUSCULAR ONCE
Qty: 1 EACH | Refills: 1 | Status: SHIPPED | OUTPATIENT
Start: 2021-08-06 | End: 2021-08-06

## 2021-08-06 RX ORDER — GABAPENTIN 300 MG/1
600 CAPSULE ORAL 3 TIMES DAILY
Qty: 540 CAPSULE | Refills: 1 | Status: SHIPPED | OUTPATIENT
Start: 2021-08-06 | End: 2022-03-09

## 2021-08-06 RX ORDER — GLIPIZIDE AND METFORMIN HCL 5; 500 MG/1; MG/1
2 TABLET, FILM COATED ORAL
Qty: 360 TABLET | Refills: 3 | Status: SHIPPED | OUTPATIENT
Start: 2021-08-06 | End: 2022-06-20

## 2021-08-06 RX ORDER — INSULIN ASPART 100 [IU]/ML
75 INJECTION, SUSPENSION SUBCUTANEOUS
Qty: 5 PEN | Refills: 5 | Status: SHIPPED | OUTPATIENT
Start: 2021-08-06 | End: 2022-03-02 | Stop reason: SDUPTHER

## 2021-08-06 RX ORDER — HUMAN INSULIN 100 [IU]/ML
INJECTION, SUSPENSION SUBCUTANEOUS
Qty: 50 ML | Refills: 12 | Status: SHIPPED | OUTPATIENT
Start: 2021-08-06

## 2021-08-06 NOTE — PROGRESS NOTES
Assessment/Plan:    Hyperlipidemia  Stable  Continue current  Diabetic polyneuropathy associated with type 2 diabetes mellitus ([facility] )    Lab Results   Component Value Date    HGBA1C 9 3 (H) 01/15/2021       Hypertension  Stable  Continue current  Diagnoses and all orders for this visit:    Obesity, morbid ([facility] )  -     Lipid panel; Future  -     Comprehensive metabolic panel; Future  -     TSH, 3rd generation; Future  -     HEMOGLOBIN A1C W/ EAG ESTIMATION; Future  -     Microalbumin / creatinine urine ratio    Need for hepatitis C screening test  -     Lipid panel; Future  -     Comprehensive metabolic panel; Future  -     TSH, 3rd generation; Future  -     HEMOGLOBIN A1C W/ EAG ESTIMATION; Future  -     Microalbumin / creatinine urine ratio    Screening for HIV (human immunodeficiency virus)  -     Lipid panel; Future  -     Comprehensive metabolic panel; Future  -     TSH, 3rd generation; Future  -     HEMOGLOBIN A1C W/ EAG ESTIMATION; Future  -     Microalbumin / creatinine urine ratio    Encounter for immunization  -     Lipid panel; Future  -     Comprehensive metabolic panel; Future  -     TSH, 3rd generation; Future  -     HEMOGLOBIN A1C W/ EAG ESTIMATION; Future  -     Microalbumin / creatinine urine ratio    Screening for colorectal cancer  -     Cologuard; Future  -     Lipid panel; Future  -     Comprehensive metabolic panel; Future  -     TSH, 3rd generation; Future  -     HEMOGLOBIN A1C W/ EAG ESTIMATION; Future  -     Microalbumin / creatinine urine ratio    Diabetic polyneuropathy associated with type 2 diabetes mellitus (HCC)  -     HEMOGLOBIN A1C W/ EAG ESTIMATION; Future  -     Lipid panel; Future  -     Comprehensive metabolic panel; Future  -     TSH, 3rd generation; Future  -     HEMOGLOBIN A1C W/ EAG ESTIMATION;  Future  -     Microalbumin / creatinine urine ratio    Type 2 diabetes mellitus with complication (HCC)  -     NovoLIN 70/30 ReliOn (70-30) 100 UNIT/ML subcutaneous injection; Inject 75 units twice daily  -     Lipid panel; Future  -     Comprehensive metabolic panel; Future  -     TSH, 3rd generation; Future  -     HEMOGLOBIN A1C W/ EAG ESTIMATION; Future  -     Microalbumin / creatinine urine ratio  -     insulin aspart protamine-insulin aspart (NovoLOG Mix 70/30 FlexPen) 100 Units/mL injection pen; Inject 75 Units under the skin 2 (two) times a day before meals  -     glipiZIDE-metFORMIN (METAGLIP) 5-500 MG per tablet; Take 2 tablets by mouth 2 (two) times a day before meals  -     gabapentin (NEURONTIN) 300 mg capsule; Take 2 capsules (600 mg total) by mouth 3 (three) times a day    Essential hypertension    Mixed hyperlipidemia    Other orders  -     Cancel: Hepatitis C Antibody ([facility]); Future  -     Cancel: HIV 1/2 Antigen/Antibody (4th Generation) w Reflex [facility]; Future  -     Cancel: TDAP VACCINE GREATER THAN OR EQUAL TO 6YO IM  -     Cancel: POCT hemoglobin A1c          Subjective:      Patient ID: Magdalena Farley is a 62 y o  male  He checks his blood pressure at home and consistently has better numbers  He denies cough  He has no CP or SOB  He has no PND, orthopnea, or MCMILLAN  His lipids are at goal on his current regimen  He has no myalgia or muscle weakness  His liver function testing is normal     He has T2DM  His A1c is not at goal   He has no side effects or hypoglycemia  He has no complications  The following portions of the patient's history were reviewed and updated as appropriate:   He  has no past medical history on file    He   Patient Active Problem List    Diagnosis Date Noted    Obesity, morbid ([place] [address] ) 08/06/2021    Medicare annual wellness visit, initial 07/31/2019    Diabetic polyneuropathy associated with type 2 diabetes mellitus ([facility] ) 03/29/2019    Diabetes ([place] [address] ) 09/28/2018    Hyperlipidemia 09/28/2018    Hypertension 09/28/2018     He  has a past surgical history that includes Appendectomy; Back surgery; Elbow surgery; Tonsillectomy; and Wrist surgery  His family history includes Diabetes in his father and mother; Heart disease in his mother; Stroke in his brother and sister  He  reports that he has never smoked  He has never used smokeless tobacco  No history on file for alcohol use and drug use  Current Outpatient Medications   Medication Sig Dispense Refill    atorvastatin (LIPITOR) 40 mg tablet take 1 tablet by mouth once daily 90 tablet 3    DULoxetine (CYMBALTA) 30 mg delayed release capsule Take 1 capsule (30 mg total) by mouth daily 30 capsule 5    gabapentin (NEURONTIN) 300 mg capsule Take 2 capsules (600 mg total) by mouth 3 (three) times a day 540 capsule 1    glipiZIDE-metFORMIN (METAGLIP) 5-500 MG per tablet Take 2 tablets by mouth 2 (two) times a day before meals 360 tablet 3    ibuprofen (MOTRIN) 800 mg tablet take 1 tablet by mouth every 6 hours if needed for mild pain 120 tablet 5    insulin aspart protamine-insulin aspart (NovoLOG Mix 70/30 FlexPen) 100 Units/mL injection pen Inject 75 Units under the skin 2 (two) times a day before meals 5 pen 5    lisinopril (ZESTRIL) 10 mg tablet take 1 tablet by mouth once daily 90 tablet 3    NovoLIN 70/30 ReliOn (70-30) 100 UNIT/ML subcutaneous injection Inject 75 units twice daily 50 mL 12     No current facility-administered medications for this visit       Current Outpatient Medications on File Prior to Visit   Medication Sig    atorvastatin (LIPITOR) 40 mg tablet take 1 tablet by mouth once daily    DULoxetine (CYMBALTA) 30 mg delayed release capsule Take 1 capsule (30 mg total) by mouth daily    ibuprofen (MOTRIN) 800 mg tablet take 1 tablet by mouth every 6 hours if needed for mild pain    lisinopril (ZESTRIL) 10 mg tablet take 1 tablet by mouth once daily    [DISCONTINUED] gabapentin (NEURONTIN) 300 mg capsule TAKE 2 CAPSULES BY MOUTH 3 TIMES A DAY    [DISCONTINUED] glipiZIDE-metFORMIN (METAGLIP) 5-500 MG per tablet TAKE 2 TABLETS BY MOUTH TWO TIMES A DAY WITH MEALS    [DISCONTINUED] insulin aspart protamine-insulin aspart (NovoLOG Mix 70/30 FlexPen) 100 Units/mL injection pen Inject 75 Units under the skin 2 (two) times a day before meals    [DISCONTINUED] NovoLIN 70/30 ReliOn (70-30) 100 UNIT/ML subcutaneous injection INJECT 75 UNITS SUBCUTANEOUSLY TWICE DAILY WITH OPTION TO TAKE 80 UNITS TWICE DAILY WITH HIGH BLOOD SUGARS     No current facility-administered medications on file prior to visit  He has No Known Allergies       Review of Systems   All other systems reviewed and are negative  Objective:      /72   Pulse 91   Temp (!) 97 4 °F (36 3 °C)   Ht 5' 10" (1 778 m)   Wt 120 kg (264 lb)   SpO2 96%   BMI 37 88 kg/m²          Physical Exam  Vitals and nursing note reviewed  Constitutional:       Appearance: Normal appearance  He is obese  HENT:      Head: Normocephalic and atraumatic  Cardiovascular:      Rate and Rhythm: Normal rate and regular rhythm  Pulses: Normal pulses  Heart sounds: Normal heart sounds  Pulmonary:      Effort: Pulmonary effort is normal       Breath sounds: Normal breath sounds  Abdominal:      General: Abdomen is flat  Bowel sounds are normal       Palpations: Abdomen is soft  Musculoskeletal:         General: Normal range of motion  Cervical back: Normal range of motion and neck supple  Skin:     General: Skin is warm and dry  Capillary Refill: Capillary refill takes less than 2 seconds  Neurological:      General: No focal deficit present  Mental Status: He is alert and oriented to person, place, and time  Mental status is at baseline  Psychiatric:         Mood and Affect: Mood normal          Behavior: Behavior normal          Thought Content:  Thought content normal          Judgment: Judgment normal

## 2021-09-02 ENCOUNTER — VBI (OUTPATIENT)
Dept: ADMINISTRATIVE | Facility: OTHER | Age: 61
End: 2021-09-02

## 2021-09-13 ENCOUNTER — TELEPHONE (OUTPATIENT)
Dept: FAMILY MEDICINE CLINIC | Facility: CLINIC | Age: 61
End: 2021-09-13

## 2021-09-13 DIAGNOSIS — E87.5 HYPERKALEMIA: Primary | ICD-10-CM

## 2021-09-13 NOTE — TELEPHONE ENCOUNTER
Pt refused to go to er, "he wasn't waiting there all day" he insists its because he eats too many bananas    Dionne Drivre has an appt in the afternoon tomorrow & said he'll wait til then , I tried to tell him he needed to go asap ,he said no & hung up

## 2021-09-14 ENCOUNTER — OFFICE VISIT (OUTPATIENT)
Dept: FAMILY MEDICINE CLINIC | Facility: CLINIC | Age: 61
End: 2021-09-14
Payer: COMMERCIAL

## 2021-09-14 VITALS
HEIGHT: 70 IN | TEMPERATURE: 97.8 F | DIASTOLIC BLOOD PRESSURE: 74 MMHG | BODY MASS INDEX: 37.8 KG/M2 | WEIGHT: 264 LBS | SYSTOLIC BLOOD PRESSURE: 140 MMHG | HEART RATE: 89 BPM

## 2021-09-14 DIAGNOSIS — E87.5 HYPERKALEMIA: ICD-10-CM

## 2021-09-14 DIAGNOSIS — L08.9 DIABETIC FOOT INFECTION (HCC): Primary | ICD-10-CM

## 2021-09-14 DIAGNOSIS — E11.628 DIABETIC FOOT INFECTION (HCC): Primary | ICD-10-CM

## 2021-09-14 DIAGNOSIS — Z28.21 INFLUENZA VACCINATION DECLINED: ICD-10-CM

## 2021-09-14 PROCEDURE — 99213 OFFICE O/P EST LOW 20 MIN: CPT | Performed by: STUDENT IN AN ORGANIZED HEALTH CARE EDUCATION/TRAINING PROGRAM

## 2021-09-14 RX ORDER — AMLODIPINE BESYLATE 10 MG/1
10 TABLET ORAL DAILY
COMMUNITY
Start: 2021-09-03 | End: 2021-10-03

## 2021-09-14 RX ORDER — CLINDAMYCIN HYDROCHLORIDE 300 MG/1
CAPSULE ORAL
COMMUNITY
Start: 2021-09-08 | End: 2022-03-02 | Stop reason: ALTCHOICE

## 2021-09-14 RX ORDER — CIPROFLOXACIN 500 MG/1
TABLET, FILM COATED ORAL
COMMUNITY
Start: 2021-09-08 | End: 2021-11-16 | Stop reason: ALTCHOICE

## 2021-09-14 RX ORDER — COLLAGENASE SANTYL 250 [ARB'U]/G
1 OINTMENT TOPICAL DAILY
COMMUNITY
Start: 2021-09-05 | End: 2022-03-02 | Stop reason: ALTCHOICE

## 2021-09-14 NOTE — PROGRESS NOTES
Assessment/Plan/Follow up information       Diagnosis ICD-10-CM Associated Orders   1  Di[facility] ([facility] )  E11 628     L08 9    2  Hyperkalemia  V77 3 Basic metabolic panel   3  Influenza vaccination declined  Z28 21         Patient strongly advised to have repeat potassium level drawn today  However patient states he will have it drawn Thursday advised patient of risk associated with this such as cardiac arrhythmia and cardiac arrest  EKG performed in office today      Continue follow-up with Dr Bari Goodell for diabetic foot ulcer /infection        Recent lab work, imagining, and imaging reports reviewed on today's visit with patient, appropriate follow up was initiated if needed  Patient was counseled/educated regarding their diagnosis, and the associated plan  They agreed with the plan, all questions and concerns were answered/addressed  Advised to contact me or the office with any concerns or questions  In the event of an emergency, and unable to contact a provider they are to go to the emergency room  CrCl cannot be calculated (Patient's most recent lab result is older than the maximum 7 days allowed  )  Subjective    HPI:  29-year-old male who states he is here in the office for diabetic foot check  Patient previously seen admitted to hospital secondary to a foot infection of the right foot where he required IV antibiotics  He was subsequently discharged and is following Dr Aminata Galvan  He is doing well in free of complaints    On chart review it appears that he had some lab work drawn approximately 4 days ago which revealed an elevated potassium level 6 4, he was advised by on-call provider to go the emergency department for redraw EKG however patient states he did not go because he "did not want to sit in the emergency room all night "    Patient states he does not truly believe his potassium is elevated as he has been taking potassium supplementation and hence did not feel the need to get redrawn  Review of Systems   Constitutional: Negative for activity change, appetite change, chills, fatigue and fever  HENT: Negative for congestion, dental problem, drooling, ear discharge, ear pain, facial swelling, postnasal drip, rhinorrhea and sinus pain  Eyes: Negative for photophobia, pain, discharge and itching  Respiratory: Negative for apnea, cough, chest tightness and shortness of breath  Cardiovascular: Negative for chest pain and leg swelling  Gastrointestinal: Negative for abdominal distention, abdominal pain, anal bleeding, constipation, diarrhea and nausea  Endocrine: Negative for cold intolerance, heat intolerance and polydipsia  Genitourinary: Negative for difficulty urinating  Musculoskeletal: Negative for arthralgias, gait problem, joint swelling and myalgias  Skin: Negative for color change and pallor  Allergic/Immunologic: Negative for immunocompromised state  Neurological: Negative for dizziness, seizures, facial asymmetry, weakness, light-headedness, numbness and headaches  Psychiatric/Behavioral: Negative for agitation, behavioral problems, confusion, decreased concentration and dysphoric mood  Objective    Vitals:    09/14/21 1423   BP: 140/74   Pulse: 89   Temp: 97 8 °F (36 6 °C)         Physical Exam  Constitutional:       Appearance: He is well-developed  HENT:      Head: Normocephalic  Eyes:      Pupils: Pupils are equal, round, and reactive to light  Cardiovascular:      Rate and Rhythm: Normal rate and regular rhythm  Pulmonary:      Effort: Pulmonary effort is normal       Breath sounds: Normal breath sounds  Abdominal:      General: Bowel sounds are normal       Palpations: Abdomen is soft  Musculoskeletal:         General: Normal range of motion  Cervical back: Normal range of motion and neck supple        Comments: Foot ulcer noted on the plantar aspect of the left foot with packing in place well-healing no obvious infection or drainage  Skin:     General: Skin is warm  Portions of the record may have been created with voice recognition software  Occasional wrong word or "sound a like" substitutions may have occurred due to the inherent limitations of voice recognition software  Read the chart carefully and recognize, using context, where substitutions have occurred  Contact me with any questions         Saurabh Dumont MD 09/14/21

## 2021-09-19 LAB
BUN SERPL-MCNC: 27 MG/DL (ref 7–25)
BUN/CREAT SERPL: 19 (CALC) (ref 6–22)
CALCIUM SERPL-MCNC: 10 MG/DL (ref 8.6–10.3)
CHLORIDE SERPL-SCNC: 101 MMOL/L (ref 98–110)
CO2 SERPL-SCNC: 28 MMOL/L (ref 20–32)
CREAT SERPL-MCNC: 1.43 MG/DL (ref 0.7–1.25)
GLUCOSE SERPL-MCNC: 153 MG/DL (ref 65–99)
POTASSIUM SERPL-SCNC: 5.6 MMOL/L (ref 3.5–5.3)
SL AMB EGFR AFRICAN AMERICAN: 61 ML/MIN/1.73M2
SL AMB EGFR NON AFRICAN AMERICAN: 53 ML/MIN/1.73M2
SODIUM SERPL-SCNC: 137 MMOL/L (ref 135–146)

## 2021-09-28 DIAGNOSIS — E11.8 TYPE 2 DIABETES MELLITUS WITH COMPLICATION (HCC): ICD-10-CM

## 2021-09-28 DIAGNOSIS — E11.8 TYPE 2 DIABETES MELLITUS WITH COMPLICATION (HCC): Primary | ICD-10-CM

## 2021-09-28 DIAGNOSIS — I10 ESSENTIAL HYPERTENSION: ICD-10-CM

## 2021-09-28 DIAGNOSIS — E87.5 HYPERKALEMIA: Primary | ICD-10-CM

## 2021-10-07 ENCOUNTER — HOSPITAL ENCOUNTER (OUTPATIENT)
Dept: RADIOLOGY | Facility: HOSPITAL | Age: 61
Discharge: HOME | End: 2021-10-07
Payer: COMMERCIAL

## 2021-10-07 ENCOUNTER — HOSPITAL ENCOUNTER (OUTPATIENT)
Dept: NON INVASIVE DIAGNOSTICS | Facility: HOSPITAL | Age: 61
Discharge: HOME | End: 2021-10-07
Payer: COMMERCIAL

## 2021-10-07 DIAGNOSIS — E11.621 DIABETIC FOOT ULCER WITH OSTEOMYELITIS (HCC): ICD-10-CM

## 2021-10-07 DIAGNOSIS — E11.51 TYPE II DIABETES MELLITUS WITH PERIPHERAL CIRCULATORY DISORDER (HCC): ICD-10-CM

## 2021-10-07 DIAGNOSIS — E11.69 DIABETIC FOOT ULCER WITH OSTEOMYELITIS (HCC): ICD-10-CM

## 2021-10-07 DIAGNOSIS — L97.509 DIABETIC FOOT ULCER WITH OSTEOMYELITIS (HCC): ICD-10-CM

## 2021-10-07 DIAGNOSIS — M86.9 DIABETIC FOOT ULCER WITH OSTEOMYELITIS (HCC): ICD-10-CM

## 2021-10-07 PROCEDURE — 93922 UPR/L XTREMITY ART 2 LEVELS: CPT | Performed by: SURGERY

## 2021-10-07 PROCEDURE — 73630 X-RAY EXAM OF FOOT: CPT

## 2021-10-07 PROCEDURE — 93926 LOWER EXTREMITY STUDY: CPT | Performed by: SURGERY

## 2021-10-07 PROCEDURE — 93926 LOWER EXTREMITY STUDY: CPT

## 2021-11-04 ENCOUNTER — APPOINTMENT (OUTPATIENT)
Dept: LAB | Facility: CLINIC | Age: 61
End: 2021-11-04
Payer: COMMERCIAL

## 2021-11-04 DIAGNOSIS — E87.5 HYPERKALEMIA: ICD-10-CM

## 2021-11-04 DIAGNOSIS — E11.8 TYPE 2 DIABETES MELLITUS WITH COMPLICATION (HCC): ICD-10-CM

## 2021-11-04 LAB
ANION GAP SERPL CALCULATED.3IONS-SCNC: 3 MMOL/L (ref 4–13)
BUN SERPL-MCNC: 24 MG/DL (ref 5–25)
CALCIUM SERPL-MCNC: 9.2 MG/DL (ref 8.3–10.1)
CHLORIDE SERPL-SCNC: 104 MMOL/L (ref 100–108)
CHOLEST SERPL-MCNC: 135 MG/DL (ref 50–200)
CO2 SERPL-SCNC: 28 MMOL/L (ref 21–32)
CREAT SERPL-MCNC: 1.46 MG/DL (ref 0.6–1.3)
EST. AVERAGE GLUCOSE BLD GHB EST-MCNC: 217 MG/DL
GFR SERPL CREATININE-BSD FRML MDRD: 52 ML/MIN/1.73SQ M
GLUCOSE P FAST SERPL-MCNC: 206 MG/DL (ref 65–99)
HBA1C MFR BLD: 9.2 %
HDLC SERPL-MCNC: 53 MG/DL
LDLC SERPL CALC-MCNC: 65 MG/DL (ref 0–100)
NONHDLC SERPL-MCNC: 82 MG/DL
POTASSIUM SERPL-SCNC: 4.9 MMOL/L (ref 3.5–5.3)
SODIUM SERPL-SCNC: 135 MMOL/L (ref 136–145)
TRIGL SERPL-MCNC: 86 MG/DL

## 2021-11-04 PROCEDURE — 80061 LIPID PANEL: CPT

## 2021-11-04 PROCEDURE — 36415 COLL VENOUS BLD VENIPUNCTURE: CPT

## 2021-11-04 PROCEDURE — 80048 BASIC METABOLIC PNL TOTAL CA: CPT

## 2021-11-04 PROCEDURE — 83036 HEMOGLOBIN GLYCOSYLATED A1C: CPT

## 2021-11-04 PROCEDURE — 3046F HEMOGLOBIN A1C LEVEL >9.0%: CPT | Performed by: FAMILY MEDICINE

## 2021-11-12 ENCOUNTER — RA CDI HCC (OUTPATIENT)
Dept: OTHER | Facility: HOSPITAL | Age: 61
End: 2021-11-12

## 2021-11-17 ENCOUNTER — TELEPHONE (OUTPATIENT)
Dept: ADMINISTRATIVE | Facility: OTHER | Age: 61
End: 2021-11-17

## 2021-11-19 ENCOUNTER — OFFICE VISIT (OUTPATIENT)
Dept: FAMILY MEDICINE CLINIC | Facility: CLINIC | Age: 61
End: 2021-11-19
Payer: COMMERCIAL

## 2021-11-19 VITALS
BODY MASS INDEX: 37.65 KG/M2 | WEIGHT: 263 LBS | OXYGEN SATURATION: 94 % | SYSTOLIC BLOOD PRESSURE: 128 MMHG | HEART RATE: 90 BPM | DIASTOLIC BLOOD PRESSURE: 66 MMHG | HEIGHT: 70 IN

## 2021-11-19 DIAGNOSIS — I10 PRIMARY HYPERTENSION: ICD-10-CM

## 2021-11-19 DIAGNOSIS — E53.8 B12 DEFICIENCY: ICD-10-CM

## 2021-11-19 DIAGNOSIS — E66.01 OBESITY, MORBID (HCC): ICD-10-CM

## 2021-11-19 DIAGNOSIS — Z23 NEEDS FLU SHOT: Primary | ICD-10-CM

## 2021-11-19 DIAGNOSIS — E11.42 DIABETIC POLYNEUROPATHY ASSOCIATED WITH TYPE 2 DIABETES MELLITUS (HCC): ICD-10-CM

## 2021-11-19 DIAGNOSIS — Z11.4 SCREENING FOR HIV (HUMAN IMMUNODEFICIENCY VIRUS): ICD-10-CM

## 2021-11-19 DIAGNOSIS — N18.31 STAGE 3A CHRONIC KIDNEY DISEASE (HCC): ICD-10-CM

## 2021-11-19 DIAGNOSIS — E78.2 MIXED HYPERLIPIDEMIA: ICD-10-CM

## 2021-11-19 PROCEDURE — 3078F DIAST BP <80 MM HG: CPT | Performed by: FAMILY MEDICINE

## 2021-11-19 PROCEDURE — 90682 RIV4 VACC RECOMBINANT DNA IM: CPT | Performed by: FAMILY MEDICINE

## 2021-11-19 PROCEDURE — G0008 ADMIN INFLUENZA VIRUS VAC: HCPCS | Performed by: FAMILY MEDICINE

## 2021-11-19 PROCEDURE — 3008F BODY MASS INDEX DOCD: CPT | Performed by: FAMILY MEDICINE

## 2021-11-19 PROCEDURE — 1036F TOBACCO NON-USER: CPT | Performed by: FAMILY MEDICINE

## 2021-11-19 PROCEDURE — 99214 OFFICE O/P EST MOD 30 MIN: CPT | Performed by: FAMILY MEDICINE

## 2021-11-19 PROCEDURE — 3074F SYST BP LT 130 MM HG: CPT | Performed by: FAMILY MEDICINE

## 2021-11-19 RX ORDER — CYANOCOBALAMIN 1000 UG/ML
1000 INJECTION INTRAMUSCULAR; SUBCUTANEOUS
Qty: 10 ML | Refills: 5 | Status: SHIPPED | OUTPATIENT
Start: 2021-11-19

## 2021-11-19 RX ORDER — SYRINGE W-NEEDLE,DISPOSAB,3 ML 25GX5/8"
SYRINGE, EMPTY DISPOSABLE MISCELLANEOUS
Qty: 100 EACH | Refills: 0 | Status: SHIPPED | OUTPATIENT
Start: 2021-11-19

## 2021-12-22 ENCOUNTER — TELEPHONE (OUTPATIENT)
Dept: FAMILY MEDICINE CLINIC | Facility: CLINIC | Age: 61
End: 2021-12-22

## 2021-12-23 DIAGNOSIS — N52.9 ERECTILE DYSFUNCTION, UNSPECIFIED ERECTILE DYSFUNCTION TYPE: Primary | ICD-10-CM

## 2021-12-28 ENCOUNTER — VBI (OUTPATIENT)
Dept: ADMINISTRATIVE | Facility: OTHER | Age: 61
End: 2021-12-28

## 2022-01-11 ENCOUNTER — VBI (OUTPATIENT)
Dept: ADMINISTRATIVE | Facility: OTHER | Age: 62
End: 2022-01-11

## 2022-01-19 DIAGNOSIS — R52 PAIN: ICD-10-CM

## 2022-01-20 RX ORDER — IBUPROFEN 800 MG/1
TABLET ORAL
Qty: 120 TABLET | Refills: 5 | Status: SHIPPED | OUTPATIENT
Start: 2022-01-20

## 2022-02-01 ENCOUNTER — TELEPHONE (OUTPATIENT)
Dept: FAMILY MEDICINE CLINIC | Facility: CLINIC | Age: 62
End: 2022-02-01

## 2022-02-01 NOTE — TELEPHONE ENCOUNTER
Pt called, requesting a refill on accu-check guide test strips, tests 4xs a day   [facility]   ty

## 2022-02-02 DIAGNOSIS — E11.8 TYPE 2 DIABETES MELLITUS WITH COMPLICATION (HCC): Primary | ICD-10-CM

## 2022-02-02 RX ORDER — LANCETS 33 GAUGE
EACH MISCELLANEOUS
Qty: 400 EACH | Refills: 3 | Status: SHIPPED | OUTPATIENT
Start: 2022-02-02

## 2022-02-03 ENCOUNTER — TELEPHONE (OUTPATIENT)
Dept: FAMILY MEDICINE CLINIC | Facility: CLINIC | Age: 62
End: 2022-02-03

## 2022-02-03 DIAGNOSIS — E11.8 TYPE 2 DIABETES MELLITUS WITH COMPLICATION (HCC): Primary | ICD-10-CM

## 2022-02-03 RX ORDER — BLOOD SUGAR DIAGNOSTIC
STRIP MISCELLANEOUS
Qty: 400 EACH | Refills: 3 | Status: SHIPPED | OUTPATIENT
Start: 2022-02-03

## 2022-02-03 NOTE — TELEPHONE ENCOUNTER
Pt got LANCETS from [place], he said he doesn't need them, he needs ACCU-CHECK test strips to [facility], hes out

## 2022-02-07 ENCOUNTER — APPOINTMENT (OUTPATIENT)
Dept: LAB | Facility: CLINIC | Age: 62
End: 2022-02-07
Payer: COMMERCIAL

## 2022-02-07 DIAGNOSIS — E78.2 MIXED HYPERLIPIDEMIA: ICD-10-CM

## 2022-02-07 DIAGNOSIS — E87.5 HYPERKALEMIA: ICD-10-CM

## 2022-02-07 DIAGNOSIS — Z11.4 SCREENING FOR HIV (HUMAN IMMUNODEFICIENCY VIRUS): ICD-10-CM

## 2022-02-07 DIAGNOSIS — E11.42 DIABETIC POLYNEUROPATHY ASSOCIATED WITH TYPE 2 DIABETES MELLITUS (HCC): ICD-10-CM

## 2022-02-07 DIAGNOSIS — N52.9 ERECTILE DYSFUNCTION, UNSPECIFIED ERECTILE DYSFUNCTION TYPE: ICD-10-CM

## 2022-02-07 DIAGNOSIS — N18.31 STAGE 3A CHRONIC KIDNEY DISEASE (HCC): ICD-10-CM

## 2022-02-07 DIAGNOSIS — Z23 ENCOUNTER FOR IMMUNIZATION: ICD-10-CM

## 2022-02-07 DIAGNOSIS — E66.01 OBESITY, MORBID (HCC): ICD-10-CM

## 2022-02-07 DIAGNOSIS — Z12.11 SCREENING FOR COLORECTAL CANCER: ICD-10-CM

## 2022-02-07 DIAGNOSIS — Z12.12 SCREENING FOR COLORECTAL CANCER: ICD-10-CM

## 2022-02-07 DIAGNOSIS — I10 PRIMARY HYPERTENSION: ICD-10-CM

## 2022-02-07 DIAGNOSIS — E11.8 TYPE 2 DIABETES MELLITUS WITH COMPLICATION (HCC): ICD-10-CM

## 2022-02-07 DIAGNOSIS — Z11.59 NEED FOR HEPATITIS C SCREENING TEST: ICD-10-CM

## 2022-02-07 DIAGNOSIS — I10 ESSENTIAL HYPERTENSION: ICD-10-CM

## 2022-02-07 LAB
ALBUMIN SERPL BCP-MCNC: 4.1 G/DL (ref 3.5–5)
ALP SERPL-CCNC: 64 U/L (ref 46–116)
ALT SERPL W P-5'-P-CCNC: 74 U/L (ref 12–78)
ANION GAP SERPL CALCULATED.3IONS-SCNC: 6 MMOL/L (ref 4–13)
AST SERPL W P-5'-P-CCNC: 50 U/L (ref 5–45)
BILIRUB SERPL-MCNC: 0.39 MG/DL (ref 0.2–1)
BUN SERPL-MCNC: 30 MG/DL (ref 5–25)
CALCIUM SERPL-MCNC: 9.6 MG/DL (ref 8.3–10.1)
CHLORIDE SERPL-SCNC: 103 MMOL/L (ref 100–108)
CHOLEST SERPL-MCNC: 145 MG/DL
CO2 SERPL-SCNC: 25 MMOL/L (ref 21–32)
CREAT SERPL-MCNC: 1.72 MG/DL (ref 0.6–1.3)
GFR SERPL CREATININE-BSD FRML MDRD: 42 ML/MIN/1.73SQ M
GLUCOSE P FAST SERPL-MCNC: 229 MG/DL (ref 65–99)
HDLC SERPL-MCNC: 60 MG/DL
LDLC SERPL CALC-MCNC: 70 MG/DL (ref 0–100)
NONHDLC SERPL-MCNC: 85 MG/DL
POTASSIUM SERPL-SCNC: 5.1 MMOL/L (ref 3.5–5.3)
PROT SERPL-MCNC: 8.2 G/DL (ref 6.4–8.2)
SODIUM SERPL-SCNC: 134 MMOL/L (ref 136–145)
TRIGL SERPL-MCNC: 77 MG/DL
TSH SERPL DL<=0.05 MIU/L-ACNC: 3.38 UIU/ML (ref 0.36–3.74)

## 2022-02-07 PROCEDURE — 36415 COLL VENOUS BLD VENIPUNCTURE: CPT

## 2022-02-07 PROCEDURE — 80061 LIPID PANEL: CPT

## 2022-02-07 PROCEDURE — 84403 ASSAY OF TOTAL TESTOSTERONE: CPT

## 2022-02-07 PROCEDURE — 83036 HEMOGLOBIN GLYCOSYLATED A1C: CPT

## 2022-02-07 PROCEDURE — 84402 ASSAY OF FREE TESTOSTERONE: CPT

## 2022-02-07 PROCEDURE — 84443 ASSAY THYROID STIM HORMONE: CPT

## 2022-02-07 PROCEDURE — 80053 COMPREHEN METABOLIC PANEL: CPT

## 2022-02-08 LAB
EST. AVERAGE GLUCOSE BLD GHB EST-MCNC: 226 MG/DL
HBA1C MFR BLD: 9.5 %
TESTOST FREE SERPL-MCNC: 24.9 PG/ML (ref 6.6–18.1)
TESTOST SERPL-MCNC: 195 NG/DL (ref 264–916)

## 2022-02-08 PROCEDURE — 3046F HEMOGLOBIN A1C LEVEL >9.0%: CPT | Performed by: FAMILY MEDICINE

## 2022-02-15 ENCOUNTER — RA CDI HCC (OUTPATIENT)
Dept: OTHER | Facility: HOSPITAL | Age: 62
End: 2022-02-15

## 2022-02-15 NOTE — PROGRESS NOTES
E11 22    [facility]  coding opportunities             Chart Reviewed * (Number of) Inbasket suggestions sent to Provider: 1                  Patients insurance company: [employer] (Medicare Advantage and Commercial)

## 2022-02-22 RX ORDER — NEEDLES, SAFETY 22GX1 1/2"
NEEDLE, DISPOSABLE MISCELLANEOUS
COMMUNITY
Start: 2021-11-19 | End: 2022-03-02 | Stop reason: SDUPTHER

## 2022-02-24 ENCOUNTER — OFFICE VISIT (OUTPATIENT)
Dept: FAMILY MEDICINE CLINIC | Facility: CLINIC | Age: 62
End: 2022-02-24
Payer: COMMERCIAL

## 2022-02-24 VITALS
HEIGHT: 70 IN | BODY MASS INDEX: 38.37 KG/M2 | WEIGHT: 268 LBS | DIASTOLIC BLOOD PRESSURE: 72 MMHG | SYSTOLIC BLOOD PRESSURE: 132 MMHG | HEART RATE: 91 BPM | TEMPERATURE: 98 F | OXYGEN SATURATION: 96 %

## 2022-02-24 DIAGNOSIS — E29.1 HYPOGONADISM IN MALE: Primary | ICD-10-CM

## 2022-02-24 DIAGNOSIS — N18.31 STAGE 3A CHRONIC KIDNEY DISEASE (HCC): ICD-10-CM

## 2022-02-24 DIAGNOSIS — L97.509 DIABETIC FOOT ULCER WITH OSTEOMYELITIS (HCC): ICD-10-CM

## 2022-02-24 DIAGNOSIS — E11.621 DIABETIC FOOT ULCER WITH OSTEOMYELITIS (HCC): ICD-10-CM

## 2022-02-24 DIAGNOSIS — E66.01 OBESITY, MORBID (HCC): ICD-10-CM

## 2022-02-24 DIAGNOSIS — M86.9 DIABETIC FOOT ULCER WITH OSTEOMYELITIS (HCC): ICD-10-CM

## 2022-02-24 DIAGNOSIS — E11.42 DIABETIC POLYNEUROPATHY ASSOCIATED WITH TYPE 2 DIABETES MELLITUS (HCC): ICD-10-CM

## 2022-02-24 DIAGNOSIS — E11.69 DIABETIC FOOT ULCER WITH OSTEOMYELITIS (HCC): ICD-10-CM

## 2022-02-24 DIAGNOSIS — E11.51 TYPE II DIABETES MELLITUS WITH PERIPHERAL CIRCULATORY DISORDER (HCC): ICD-10-CM

## 2022-02-24 DIAGNOSIS — Z23 NEED FOR SHINGLES VACCINE: ICD-10-CM

## 2022-02-24 PROCEDURE — 3075F SYST BP GE 130 - 139MM HG: CPT | Performed by: FAMILY MEDICINE

## 2022-02-24 PROCEDURE — 3008F BODY MASS INDEX DOCD: CPT | Performed by: FAMILY MEDICINE

## 2022-02-24 PROCEDURE — 1036F TOBACCO NON-USER: CPT | Performed by: FAMILY MEDICINE

## 2022-02-24 PROCEDURE — 99214 OFFICE O/P EST MOD 30 MIN: CPT | Performed by: FAMILY MEDICINE

## 2022-02-24 PROCEDURE — 3725F SCREEN DEPRESSION PERFORMED: CPT | Performed by: FAMILY MEDICINE

## 2022-02-24 PROCEDURE — 3078F DIAST BP <80 MM HG: CPT | Performed by: FAMILY MEDICINE

## 2022-02-24 RX ORDER — BLOOD-GLUCOSE METER
EACH MISCELLANEOUS
COMMUNITY
Start: 2022-02-09

## 2022-02-24 RX ORDER — TESTOSTERONE 20.25 MG/1.25G
20.25 GEL TOPICAL DAILY
Qty: 30 PACKET | Refills: 5 | Status: SHIPPED | OUTPATIENT
Start: 2022-02-24

## 2022-02-24 RX ORDER — ZOSTER VACCINE RECOMBINANT, ADJUVANTED 50 MCG/0.5
0.5 KIT INTRAMUSCULAR ONCE
Qty: 1 EACH | Refills: 1 | Status: SHIPPED | OUTPATIENT
Start: 2022-02-24 | End: 2022-02-24

## 2022-02-24 NOTE — PROGRESS NOTES
Diabetic Foot Exam    Patient's shoes and socks removed  Right Foot/Ankle   Right Foot Inspection  Skin Exam: skin normal and skin intact  No dry skin, no warmth, no callus, no erythema, no maceration, no abnormal color, no pre-ulcer, no ulcer and no callus  Toe Exam: ROM and strength within normal limits  Sensory   Vibration: absent  Proprioception: absent  Monofilament testing: absent    Vascular  Capillary refills: < 3 seconds  The right DP pulse is 1+  The right PT pulse is 1+  Left Foot/Ankle  Left Foot Inspection  Skin Exam: skin normal and skin intact  No dry skin, no warmth, no erythema, no maceration, normal color, no pre-ulcer, no ulcer and no callus  Toe Exam: ROM and strength within normal limits  Sensory   Vibration: absent  Proprioception: absent  Monofilament testing: absent    Vascular  Capillary refills: < 3 seconds  The left DP pulse is 1+  The left PT pulse is 1+  Assign Risk Category  No deformity present  Loss of protective sensation  No weak pulses  Risk: 1       Assessment/Plan:    No problem-specific Assessment & Plan notes found for this encounter  Diagnoses and all orders for this visit:    Hypogonadism in male  -     testosterone (ANDROGEL) 1 62 %; Apply 1 packet (20 25 mg total) topically daily  -     Testosterone, free, total; Future  -     HEMOGLOBIN A1C W/ EAG ESTIMATION; Future    Type II diabetes mellitus with peripheral circulatory disorder [facility])  -     Ambulatory referral to clinical pharmacy; Future  -     Testosterone, free, total; Future  -     HEMOGLOBIN A1C W/ EAG ESTIMATION; Future    Diabetic polyneuropathy associated with type 2 diabetes mellitus (HCC)    Obesity, morbid ([address] )    Stage 3a chronic kidney disease ([address] )    Diabetic foot ulcer with osteomyelitis ([address] )    Need for shingles vaccine  -     Zoster Vac Recomb Adjuvanted (Shingrix) 50 MCG/0 5ML SUSR;  Inject 0 5 mL into a muscle once for 1 dose Repeat dose in 2 to 6 months    Other orders  -     B-D SYRINGE/NEEDLE 3CC/22GX1 5 22G X 1-1/2" 3 ML MISC; Use as directed  -     Blood Glucose Monitoring Suppl (OneTouch Verio Flex System) w/Device KIT; USE AS DIRECTED BY MD TESTING FOUR TIMES DAILY          Subjective:      Patient ID: Ihsan Terrell is a 62 y o  male  HPI    The following portions of the patient's history were reviewed and updated as appropriate:   He  has no past medical history on file  He   Patient Active Problem List    Diagnosis Date Noted    Type II diabetes mellitus with peripheral circulatory disorder ([facility] ) 02/24/2022    Stage 3a chronic kidney disease ([facility] ) 11/19/2021    Obesity, morbid ([facility] ) 08/06/2021    Medicare annual wellness visit, initial 07/31/2019    Diabetic polyneuropathy associated with type 2 diabetes mellitus ([facility] ) 03/29/2019    Diabetic foot ulcer with osteomyelitis ([place] [address] ) 09/28/2018    Hyperlipidemia 09/28/2018    Hypertension 09/28/2018     He  has a past surgical history that includes Appendectomy; Back surgery; Elbow surgery; Tonsillectomy; and Wrist surgery  His family history includes Diabetes in his father and mother; Heart disease in his mother; Stroke in his brother and sister  He  reports that he has never smoked  He has never used smokeless tobacco  No history on file for alcohol use and drug use    Current Outpatient Medications   Medication Sig Dispense Refill    atorvastatin (LIPITOR) 40 mg tablet take 1 tablet by mouth once daily 90 tablet 3    B-D SYRINGE/NEEDLE 3CC/22GX1 5 22G X 1-1/2" 3 ML MISC Use as directed      Blood Glucose Monitoring Suppl (OneTouch Verio Flex System) w/Device KIT USE AS DIRECTED BY MD TESTING FOUR TIMES DAILY      clindamycin (CLEOCIN) 300 MG capsule TAKE 1 CAPSULE BY MOUTH THREE TIMES DAILY FOR 7 DAYS      collagenase (Santyl) ointment Apply 1 application topically daily      cyanocobalamin 1,000 mcg/mL Inject 1 mL (1,000 mcg total) into a muscle every 30 (thirty) days 10 mL 5    gabapentin (NEURONTIN) 300 mg capsule Take 2 capsules (600 mg total) by mouth 3 (three) times a day 540 capsule 1    glipiZIDE-metFORMIN (METAGLIP) 5-500 MG per tablet Take 2 tablets by mouth 2 (two) times a day before meals 360 tablet 3    glucose blood (OneTouch Verio) test strip Check blood sugars four times daily  Please substitute with appropriate alternative as covered by patient's insurance  Dx: E11 65 400 each 3    ibuprofen (MOTRIN) 800 mg tablet take 1 tablet by mouth every 6 hours if needed for mild pain 120 tablet 5    insulin aspart protamine-insulin aspart (NovoLOG Mix 70/30 FlexPen) 100 Units/mL injection pen Inject 75 Units under the skin 2 (two) times a day before meals 5 pen 5    lisinopril (ZESTRIL) 10 mg tablet take 1 tablet by mouth once daily 90 tablet 3    metFORMIN (GLUCOPHAGE) 1000 MG tablet Take 1,000 mg by mouth      NovoLIN 70/30 ReliOn (70-30) 100 UNIT/ML subcutaneous injection Inject 75 units twice daily 50 mL 12    OneTouch Delica Lancets 75H MISC Check blood sugars four times daily  Please substitute with appropriate alternative as covered by patient's insurance  Dx: E11 65 400 each 3    Syringe/Needle, Disp, (SYRINGE 3CC/20GX1") 20G X 1" 3 ML MISC Use every 30 (thirty) days 100 each 0    testosterone (ANDROGEL) 1 62 % Apply 1 packet (20 25 mg total) topically daily 30 packet 5     No current facility-administered medications for this visit       Current Outpatient Medications on File Prior to Visit   Medication Sig    atorvastatin (LIPITOR) 40 mg tablet take 1 tablet by mouth once daily    B-D SYRINGE/NEEDLE 3CC/22GX1 5 22G X 1-1/2" 3 ML MISC Use as directed    Blood Glucose Monitoring Suppl (OneTouch Verio Flex System) w/Device KIT USE AS DIRECTED BY MD TESTING FOUR TIMES DAILY    clindamycin (CLEOCIN) 300 MG capsule TAKE 1 CAPSULE BY MOUTH THREE TIMES DAILY FOR 7 DAYS    collagenase (Santyl) ointment Apply 1 application topically daily    cyanocobalamin 1,000 mcg/mL Inject 1 mL (1,000 mcg total) into a muscle every 30 (thirty) days    gabapentin (NEURONTIN) 300 mg capsule Take 2 capsules (600 mg total) by mouth 3 (three) times a day    glipiZIDE-metFORMIN (METAGLIP) 5-500 MG per tablet Take 2 tablets by mouth 2 (two) times a day before meals    glucose blood (OneTouch Verio) test strip Check blood sugars four times daily  Please substitute with appropriate alternative as covered by patient's insurance  Dx: E11 65    ibuprofen (MOTRIN) 800 mg tablet take 1 tablet by mouth every 6 hours if needed for mild pain    insulin aspart protamine-insulin aspart (NovoLOG Mix 70/30 FlexPen) 100 Units/mL injection pen Inject 75 Units under the skin 2 (two) times a day before meals    lisinopril (ZESTRIL) 10 mg tablet take 1 tablet by mouth once daily    metFORMIN (GLUCOPHAGE) 1000 MG tablet Take 1,000 mg by mouth    NovoLIN 70/30 ReliOn (70-30) 100 UNIT/ML subcutaneous injection Inject 75 units twice daily    OneTouch Delica Lancets 07L MISC Check blood sugars four times daily  Please substitute with appropriate alternative as covered by patient's insurance  Dx: E11 65    Syringe/Needle, Disp, (SYRINGE 3CC/20GX1") 20G X 1" 3 ML MISC Use every 30 (thirty) days     No current facility-administered medications on file prior to visit  He has No Known Allergies       Review of Systems      Objective:      /72   Pulse 91   Temp 98 °F (36 7 °C)   Ht 5' 10" (1 778 m)   Wt 122 kg (268 lb)   SpO2 96%   BMI 38 45 kg/m²          Physical Exam  Cardiovascular:      Pulses: no weak pulses          Dorsalis pedis pulses are 1+ on the right side and 1+ on the left side  Posterior tibial pulses are 1+ on the right side and 1+ on the left side  Feet:      Right foot:      Skin integrity: No ulcer, skin breakdown, erythema, warmth, callus or dry skin  Left foot:      Skin integrity: No ulcer, skin breakdown, erythema, warmth, callus or dry skin

## 2022-02-25 NOTE — PROGRESS NOTES
Assessment/Plan:      Diagnoses and all orders for this visit:    Hypogonadism in male  -     testosterone (ANDROGEL) 1 62 %; Apply 1 packet (20 25 mg total) topically daily  -     Testosterone, free, total; Future  -     HEMOGLOBIN A1C W/ EAG ESTIMATION; Future    Type II diabetes mellitus with peripheral circulatory disorder [facility])  -     Ambulatory referral to clinical pharmacy; Future  -     Testosterone, free, total; Future  -     HEMOGLOBIN A1C W/ EAG ESTIMATION; Future    Diabetic polyneuropathy associated with type 2 diabetes mellitus (HCC)    Obesity, morbid ([place] [address] )    Stage 3a chronic kidney disease ([place] [address] )    Diabetic foot ulcer with osteomyelitis ([place] [address] )    Need for shingles vaccine  -     Zoster Vac Recomb Adjuvanted (Shingrix) 50 MCG/0 5ML SUSR; Inject 0 5 mL into a muscle once for 1 dose Repeat dose in 2 to 6 months    Other orders  -     B-D SYRINGE/NEEDLE 3CC/22GX1 5 22G X 1-1/2" 3 ML MISC; Use as directed  -     Blood Glucose Monitoring Suppl (OneTouch Verio Flex System) w/Device KIT; USE AS DIRECTED BY MD TESTING FOUR TIMES DAILY          Subjective:     Patient ID: Brigida Sevilla is a 62 y o  male  His A1c is not at goal   He is on a high dose of insulin  He has trouble affording his medications  I have tried to start both an SGLT-2 and a GLP-1 with him in the past and had difficulties with his insurance  He has no side effects and no hypoglycemia  He is on high-intensity statin therapy and has no myalgia or muscle weakness  He has normal LFTs  His BP is at goal on his current regimen  He has no CP or SOB  He has no HA or vision changes  He has no PND or orthopnea  Review of Systems   All other systems reviewed and are negative  Objective:     Physical Exam  Vitals and nursing note reviewed  Constitutional:       Appearance: Normal appearance  He is obese  HENT:      Head: Normocephalic and atraumatic     Cardiovascular:      Rate and Rhythm: Normal rate and regular rhythm  Pulses: Normal pulses  Heart sounds: Normal heart sounds  Pulmonary:      Effort: Pulmonary effort is normal       Breath sounds: Normal breath sounds  Abdominal:      General: Abdomen is flat  Bowel sounds are normal       Palpations: Abdomen is soft  Musculoskeletal:         General: Normal range of motion  Cervical back: Normal range of motion and neck supple  Skin:     General: Skin is warm  Capillary Refill: Capillary refill takes less than 2 seconds  Neurological:      General: No focal deficit present  Mental Status: He is alert and oriented to person, place, and time  Mental status is at baseline  Psychiatric:         Mood and Affect: Mood normal          Behavior: Behavior normal          Thought Content:  Thought content normal          Judgment: Judgment normal

## 2022-03-02 ENCOUNTER — CLINICAL SUPPORT (OUTPATIENT)
Dept: FAMILY MEDICINE CLINIC | Facility: CLINIC | Age: 62
End: 2022-03-02

## 2022-03-02 DIAGNOSIS — E11.51 TYPE II DIABETES MELLITUS WITH PERIPHERAL CIRCULATORY DISORDER (HCC): Primary | ICD-10-CM

## 2022-03-02 RX ORDER — ASPIRIN 325 MG
325 TABLET ORAL DAILY
COMMUNITY

## 2022-03-02 RX ORDER — ACETAMINOPHEN,DIPHENHYDRAMINE HCL 500; 25 MG/1; MG/1
2 TABLET, FILM COATED ORAL
COMMUNITY

## 2022-03-02 NOTE — PROGRESS NOTES
[address] Pharmacist    Shy Tipton is a 62 y o  male who was referred to the pharmacist for DM education and management, referred by Yohan Santoro MD      Telemedicine consent  The patient was identified by name and date of birth  Shy Tipton was informed that this is a telemedicine visit and that the visit is being conducted through Telephone  My office door was closed  No one else was in the room  He acknowledged consent and understanding of privacy and security of the video platform  The patient has agreed to participate and understands they can discontinue the visit at any time  Assessment/ Plan     1  Type 2 Diabetes:   Goal A1c <7% based on ADA guidelines  Most recent A1c above goal at 9 5% (2/7/22)   Reported Home SMBG  o Morning fasting are better controlled (average 160 mg/dL)  Before dinner readings elevated (average 968 mg/dL)   Complications:  o Microvascular complications: neuropathy, CKD   o Macrovascular complications: peripheral circulatory disorder  o Medication options  - Hx CKD  SGLT-2 inhibitor indicated for renal benefits  He would also benefit from GLP-1 agonist as well for additional weight loss/ A1c lowering  Cost has been a barrier in the past to obtaining brand medications  · Medication Cost  · Patient meets income criteria for [id number]  Provided phone number and website for pt to apply  · If denied, he would meet income criteria for [employer] patient assistance program        Changes to Medication Regimen:    No changes today   Patient injects anywhere from 0 units to 100 units of Novolog mix 70/30 with meals depending on what his blood sugar is  He is to start logging how much insulin he uses with meals so that we can better adjust and monitor his insulin therapy   Plan to start SGLT-2 inhibitor once patient applies/approved for [employer]ACE[employer]PACENET       2  Need for Additional Therapies:   Statin: atorvastatin 40 mg daily  Last LDL 70 which is at goal     ACEI/ARB: on lisinopril 10 mg daily  Microalbuminuria present on last urine chem   ASA: yes    Neuropathy: Gabapentin 300 mg - 2 caps TID     3  Health Maintenance:   Eye Exam: up to date; last completed 4/26/21   Foot Exam: up to date; last completed 2/25/22   Urine Microalbumin: due; lab order pended    4  Medication Reconciliation:    Medication list reviewed with pt at today's visit  Discrepancies as discussed below  o Removed duplicate medications- Novolog 70/30 mix, metformin alone  o Removed completed medications- clindamycin, collagenase   o Not taking testosterone yet  Med needed prior auth   Medication list updated to reflect medications pt is currently taking    Monitoring: testing 4x daily    Follow-up: 2 weeks    Subjective     1  Medication Adherence/ Tolerability:   Novolin 70/30 mix vial- Injects anywhere from 0 - 100 units daily based on pre-meal BG  Estimates that if  mg/dL or lower no injection; 150-200 mg/dL 75 units; if blood sugar > 200 then does 100 units  sometimes do a little bit more when sugar is in 270's  (takes with breakfast and supper time)    Glipizide- metformin 5-500 mg- 2 tabs BID (lunch and before bed)     Other: Testosterone gel was denied through insurance but was approved with prior auth  Informed patient  Review of Systems    Medications Tried in Past:  Janumet- cost constraints     2  Lifestyle:   Did not address due to time and focus on meds and cost this apt       3  Home monitoring devices   Glucometer: Yes,    CGM: No, Patient would benefit however is only on BID injections (insurance requires TID injections)   BP monitor: unknown     Objective     Lab Results   Component Value Date    HGBA1C 9 5 (H) 02/07/2022    HGBA1C 9 2 (H) 11/04/2021    HGBA1C 9 4 (H) 08/31/2021       Blood Glucose Readings  The patient is currently checking blood glucose 4 times per day only writes down 2 readings per day  Patient reports with SMBG logs  Lunch readings are 180-200 mg/dL  Date AM Dinner   2/21 231 161   2/22 124 207   2/23 141 235   2/24 103 265   2/25 84 277   2/26 156 243   2/27 156 264   2/28 165 270   3/1 165 270   3/2 275    Avg 160 243     Hypoglycemia: The patient had 0 episodes/symptoms of hypoglycemia in past 2 weeks  Does report that he used to get low blood sugar readings around 3-4 AM  Last event was Dec 2022 and reading was 51 mg/dL  Ate cup of canned fruit  ASCVD Risk:  The 10-year ASCVD risk score (Jermaine Olmstead et al , 2013) is: 13%    Values used to calculate the score:      Age: 62 years      Sex: Male      Is Non- : No      Diabetic: Yes      Tobacco smoker: No      Systolic Blood Pressure: 933 mmHg      Is BP treated: Yes      HDL Cholesterol: 60 mg/dL      Total Cholesterol: 145 mg/dL     Vitals: There were no vitals filed for this visit  Labs:    Lab Results   Component Value Date    SODIUM 134 (L) 02/07/2022    K 5 1 02/07/2022    EGFR 42 02/07/2022    CREATININE 1 72 (H) 02/07/2022    GLUF 229 (H) 02/07/2022         Pharmacist Tracking Tool       Reason For Outreach  Embedded Pharmacist    Demographics  Interaction Method: Phone  Type of Intervention: New    Topic(s) Addressed  Diabetes; Medication management    Intervention(s) Made    Pharmacologic:    -- Medication reconciliation    Non-Pharmacologic:    -- Adherence addressed  -- Cost  -- Home monitoring discussed or provided  -- Labs ordered  -- Medication monitoring    Tool(s) Used  Not Applicable    Time Spent:   Time Spent in Direct Patient Care: 60 minutes  Time Spent in Care Coordination: 10 minutes    Recommendations  Recipient: Provider  Outcome:  All Accepted

## 2022-03-04 ENCOUNTER — TELEPHONE (OUTPATIENT)
Dept: FAMILY MEDICINE CLINIC | Facility: CLINIC | Age: 62
End: 2022-03-04

## 2022-03-04 NOTE — PROGRESS NOTES
Patient called [employer] and he does not meet criteria for their program  Next step- apply for [place] patient assistance program  Will mail application to patient and assist in filling out provider portion of application      Reason For Outreach  Embedded Pharmacist    Demographics  Interaction Method: Phone  Type of Intervention: Follow-Up    Topic(s) Addressed  Diabetes    Intervention(s) Made      Non-Pharmacologic:    -- Cost    Tool(s) Used  Not Applicable    Time Spent:   Time Spent in Direct Patient Care: 5 minutes  Time Spent in Care Coordination: 15 minutes    Recommendations  Recipient: Patient/caregiver  Outcome: Cost Savings Information Provided

## 2022-03-09 DIAGNOSIS — E11.8 TYPE 2 DIABETES MELLITUS WITH COMPLICATION (HCC): ICD-10-CM

## 2022-03-09 RX ORDER — GABAPENTIN 300 MG/1
CAPSULE ORAL
Qty: 540 CAPSULE | Refills: 1 | Status: SHIPPED | OUTPATIENT
Start: 2022-03-09

## 2022-03-16 ENCOUNTER — CLINICAL SUPPORT (OUTPATIENT)
Dept: FAMILY MEDICINE CLINIC | Facility: CLINIC | Age: 62
End: 2022-03-16

## 2022-03-16 DIAGNOSIS — E11.51 TYPE II DIABETES MELLITUS WITH PERIPHERAL CIRCULATORY DISORDER (HCC): Primary | ICD-10-CM

## 2022-03-16 NOTE — PROGRESS NOTES
[address]    Zenobia Costa is a 62 y o  male who was referred to the pharmacist for DM education and management, referred by Ricky Garcia MD      Telemedicine consent  The patient was identified by name and date of birth  Zenobia Costa was informed that this is a telemedicine visit and that the visit is being conducted through Telephone  My office door was closed  No one else was in the room  He acknowledged consent and understanding of privacy and security of the video platform  The patient has agreed to participate and understands they can discontinue the visit at any time  Assessment/ Plan     1  Type 2 Diabetes:   Goal A1c <7% based on ADA guidelines  Most recent A1c above goal at 9 5% (2/7/22)   Reported Home SMBG  o Blood sugar control improving  Readings were previously in the 300's  Now ranging mostly from 180-200 mg/dL   Complications:  o Microvascular complications: neuropathy, CKD   o Macrovascular complications: peripheral circulatory disorder  o Medication options  - Hx CKD  SGLT-2 inhibitor indicated for renal benefits    - Future consideration- He would also benefit from GLP-1 agonist as well for additional weight loss/ A1c lowering  He would need to obtain this through patient assistance program   · Medication Cost  · Patient does not qualify for PACE/PACENET (age <65 yo)  · He does meet income criteria for [place] patient assistance program  Application completed by patient and dropped off at the [place] office  Changes to Medication Regimen: If PCP is in agreement  · Initiate Farxiga 5 mg daily through patient assistance program  Ordered pended to add to med list but set to "no print" since it will not be filled through pt's local pharmacy  2  Need for Additional Therapies:   Statin: atorvastatin 40 mg daily  Last LDL 70 which is at goal     ACEI/ARB: on lisinopril 10 mg daily   Microalbuminuria present on last urine chem   ASA: yes    Neuropathy: Gabapentin 300 mg - 2 caps TID     3  Health Maintenance:   Eye Exam: up to date; last completed 4/26/21   Foot Exam: up to date; last completed 2/25/22   Urine Microalbumin: due; lab already ordered    4  Medication Reconciliation:   · Medication list reviewed with pt at today's visit   Medication list updated to reflect medications pt is currently taking    Monitoring: testing 4x daily    Follow-up: 3 weeks    Subjective     1  Medication Adherence/ Tolerability:   Novolin 70/30 mix vial- Injects anywhere from 0 - 100 units daily based on pre-meal BG  Estimates that if  mg/dL or lower no injection; 150-200 mg/dL 75 units; if blood sugar > 200 then does 100 units   Glipizide- metformin 5-500 mg- 2 tabs BID (lunch and before bed) - sometimes will skip a dose of he thinks his readings are "too low"   Discussed not skipping doses unless he completely skips that meal  Otherwise, take with food BID as scheduled  Review of Systems   All other systems reviewed and are negative  Medications Tried in Past:  Janumet- cost constraints     2  Lifestyle:   · Wakes up: 9 AM  · Breakfast: cereal small (~1/2 cup fruit loops) with 2% milk   · Lunch: TV dinners or left overs    · Dinner: chicken and vegetables (green beans, corn)  · Bedtime: banana   · Snacks: nuts   · Beverages: lemonade crystal light   · Exercise: no formal exercise right now  In summer cut grass but on riding   Walks when he goes grocery shopping  Encouraged increased physical activity  Goal is 150 min / week exercise     3   Home monitoring devices   Glucometer: Yes,    CGM: No, Patient would benefit however is only on BID injections (insurance requires TID injections)   BP monitor: unknown     Objective     Lab Results   Component Value Date    HGBA1C 9 5 (H) 02/07/2022    HGBA1C 9 2 (H) 11/04/2021    HGBA1C 9 4 (H) 08/31/2021       Blood Glucose Readings  The patient is currently checking blood glucose 4 times per day only writes down 2 readings per day  Patient reports with SMBG logs  Date AM Lunch Dinner Bedtime   3/6 200    100 units 62 177    75 units 175   3/7 161    0 units 288    50 units 210    100 units 260   3/8 100    0 units 210 231    100 units 197   3/9 179    75 units 120 143    0 units 237       3/10 277    100 units 86 200    100 units 210   3/11 163    0 units 300    100 units 165    50 units 200   3/12 210    100 units 198 236    100 units 204   3/13 136    0 units 201 314    100 units 130   3/14 176    50 units 304 289    100 units 220   3/15 200    100 units 83    Didn't take metformin  223    100 units 190   3/16 139      Avg 176 185 219 202     Hypoglycemia: The patient had 0 episodes/symptoms of hypoglycemia in past 2 weeks  Does report that he used to get low blood sugar readings around 3-4 AM  Last event was Dec 2022 and reading was 51 mg/dL  Ate cup of canned fruit  ASCVD Risk:  The 10-year ASCVD risk score (Heaven Christensen et al , 2013) is: 13%    Values used to calculate the score:      Age: 62 years      Sex: Male      Is Non- : No      Diabetic: Yes      Tobacco smoker: No      Systolic Blood Pressure: 460 mmHg      Is BP treated: Yes      HDL Cholesterol: 60 mg/dL      Total Cholesterol: 145 mg/dL     Vitals: There were no vitals filed for this visit      Labs:    Lab Results   Component Value Date    SODIUM 134 (L) 02/07/2022    K 5 1 02/07/2022    EGFR 42 02/07/2022    CREATININE 1 72 (H) 02/07/2022    GLUF 229 (H) 02/07/2022         Pharmacist Tracking Tool     Reason For Outreach  Embedded Pharmacist    Demographics  Interaction Method: Phone  Type of Intervention: Follow-Up    Topic(s) Addressed  Diabetes    Intervention(s) Made    Pharmacologic:    -- Medication Initiation    Non-Pharmacologic:    -- Adherence addressed  -- Cost  -- Disease state education  -- Home monitoring discussed or provided  -- Lifestyle education  -- Medication monitoring    Tool(s) Used  Not Applicable    Time Spent:   Time Spent in Direct Patient Care: 30 minutes  Time Spent in Care Coordination: 15 minutes    Recommendations  Recipient: Provider  Outcome:  All Accepted

## 2022-03-31 ENCOUNTER — TELEPHONE (OUTPATIENT)
Dept: FAMILY MEDICINE CLINIC | Facility: CLINIC | Age: 62
End: 2022-03-31

## 2022-03-31 NOTE — TELEPHONE ENCOUNTER
[place] for [place] program for 3 months  They are requesting he applies for medicaid  If approved for medicaid he can get rx lower cost at pharmacy  If denied, he can send [place] the denial [place] and continue getting [place] free through their program through the end of the year

## 2022-04-11 ENCOUNTER — CLINICAL SUPPORT (OUTPATIENT)
Dept: FAMILY MEDICINE CLINIC | Facility: CLINIC | Age: 62
End: 2022-04-11

## 2022-04-11 DIAGNOSIS — E11.51 TYPE II DIABETES MELLITUS WITH PERIPHERAL CIRCULATORY DISORDER (HCC): Primary | ICD-10-CM

## 2022-04-11 NOTE — PROGRESS NOTES
[address] Pharmacist    Pati Ray is a 62 y o  male who was referred to the pharmacist for DM education and management, referred by Rosio Epsp MD      Telemedicine consent  The patient was identified by name and date of birth  Pati Ray was informed that this is a telemedicine visit and that the visit is being conducted through Telephone  My office door was closed  No one else was in the room  He acknowledged consent and understanding of privacy and security of the video platform  The patient has agreed to participate and understands they can discontinue the visit at any time  Assessment/ Plan     1  Type 2 Diabetes:   Goal A1c <7% based on ADA guidelines  Most recent A1c above goal at 9 5% (2/7/22)  · Reported Home SMBG  o Still ranging from 180-200 mg/dL however patient has not started Gerold Comber yet  · Complications:  o Microvascular complications: neuropathy, CKD   o Macrovascular complications: peripheral circulatory disorder  o Medication options  - Hx CKD  SGLT-2 inhibitor indicated for renal benefits    - Future consideration- He would also benefit from GLP-1 agonist as well for additional weight loss/ A1c lowering  He would need to obtain this through patient assistance program   · Medication Cost  · Approved for [employer] program for 3 months  Program identified that he may be eligible for medicaid and requested he applies  If denied medicaid, [facility]'s program will extend program for 1 year  Changes to Medication Regimen: If PCP is in agreement  · Patient getting medication shipped to his house today from assistance program  Start Farxiga 5 mg daily    2  Need for Additional Therapies:   Statin: atorvastatin 40 mg daily  Last LDL 70 which is at goal     ACEI/ARB: on lisinopril 10 mg daily  Microalbuminuria present on last urine chem   ASA: yes    Neuropathy: Gabapentin 300 mg - 2 caps TID     3   Health Maintenance:   Eye Exam: up to date; last completed 4/26/21   Foot Exam: up to date; last completed 2/25/22   Urine Microalbumin: due; lab already ordered    4  Medication Reconciliation:   · Medication list reviewed with pt at today's visit   Medication list updated to reflect medications pt is currently taking    Monitoring: testing 4x daily    Follow-up: 3 weeks    Subjective     1  Medication Adherence/ Tolerability:   Novolin 70/30 mix vial- Injects anywhere from 0 - 100 units daily based on pre-meal BG  Estimates that if  mg/dL or lower no injection; 150-200 mg/dL 75 units; if blood sugar > 200 then does 100 units   Glipizide- metformin 5-500 mg- 2 tabs BID (lunch and before bed) - sometimes will skip a dose of he thinks his readings are "too low"   Discussed not skipping doses unless he completely skips that meal  Otherwise, take with food BID as scheduled  Review of Systems   All other systems reviewed and are negative  Medications Tried in Past:  Janumet- cost constraints     2  Lifestyle:   · Wakes up: 9 AM  · Breakfast: cereal small (~1/2 cup fruit loops) with 2% milk   · Lunch: TV dinners or left overs    · Dinner: chicken and vegetables (green beans, corn)  · Bedtime: banana   · Snacks: nuts   · Beverages: lemonade crystal light   · Exercise: no formal exercise right now  In summer cut grass but on riding   Walks when he goes grocery shopping  Encouraged increased physical activity  Goal is 150 min / week exercise     3  Home monitoring devices   Glucometer: Yes,    CGM: No, Patient would benefit however is only on BID injections (insurance requires TID injections)   BP monitor: unknown     Objective     Lab Results   Component Value Date    HGBA1C 9 5 (H) 02/07/2022    HGBA1C 9 2 (H) 11/04/2021    HGBA1C 9 4 (H) 08/31/2021       Blood Glucose Readings  The patient is currently checking blood glucose 4 times per day only writes down 2 readings per day   Patient does not report with SMBG logs  Hypoglycemia: The patient had 0 episodes/symptoms of hypoglycemia in past 2 weeks  ASCVD Risk:  The 10-year ASCVD risk score (Gui Jordan et al , 2013) is: 13%    Values used to calculate the score:      Age: 62 years      Sex: Male      Is Non- : No      Diabetic: Yes      Tobacco smoker: No      Systolic Blood Pressure: 025 mmHg      Is BP treated: Yes      HDL Cholesterol: 60 mg/dL      Total Cholesterol: 145 mg/dL     Vitals: There were no vitals filed for this visit      Labs:    Lab Results   Component Value Date    SODIUM 134 (L) 02/07/2022    K 5 1 02/07/2022    EGFR 42 02/07/2022    CREATININE 1 72 (H) 02/07/2022    GLUF 229 (H) 02/07/2022         Pharmacist Tracking Tool     Reason For Outreach  Embedded Pharmacist    Demographics  Interaction Method: Phone  Type of Intervention: Follow-Up    Topic(s) Addressed  Diabetes    Intervention(s) Made      Non-Pharmacologic:    -- Cost  -- Lifestyle education    Tool(s) Used  Not Applicable    Time Spent:   Time Spent in Direct Patient Care: 15 minutes  Time Spent in Care Coordination: 5 minutes    Recommendations  Recipient: Patient/caregiver  Outcome: Education Provided

## 2022-05-03 ENCOUNTER — TELEPHONE (OUTPATIENT)
Dept: FAMILY MEDICINE CLINIC | Facility: CLINIC | Age: 62
End: 2022-05-03

## 2022-05-03 DIAGNOSIS — E78.5 HYPERLIPIDEMIA, UNSPECIFIED HYPERLIPIDEMIA TYPE: ICD-10-CM

## 2022-05-03 DIAGNOSIS — E11.8 TYPE 2 DIABETES MELLITUS WITH COMPLICATION (HCC): ICD-10-CM

## 2022-05-03 PROCEDURE — 4010F ACE/ARB THERAPY RXD/TAKEN: CPT | Performed by: FAMILY MEDICINE

## 2022-05-03 RX ORDER — ATORVASTATIN CALCIUM 40 MG/1
TABLET, FILM COATED ORAL
Qty: 90 TABLET | Refills: 3 | Status: SHIPPED | OUTPATIENT
Start: 2022-05-03

## 2022-05-03 RX ORDER — LISINOPRIL 10 MG/1
TABLET ORAL
Qty: 90 TABLET | Refills: 3 | Status: SHIPPED | OUTPATIENT
Start: 2022-05-03

## 2022-05-03 NOTE — PROGRESS NOTES
[address] Pharmacist    Anne-Marie Hutton is a 62 y o  male who was referred to the pharmacist for DM education and management, referred by Juan Francisco Munguia MD      Telemedicine consent  The patient was identified by name and date of birth  Anne-Marie Hutton was informed that this is a telemedicine visit and that the visit is being conducted through Telephone  My office door was closed  No one else was in the room  He acknowledged consent and understanding of privacy and security of the video platform  The patient has agreed to participate and understands they can discontinue the visit at any time  Assessment/ Plan     1  Type 2 Diabetes:   Goal A1c <7% based on ADA guidelines  Most recent A1c above goal at 9 5% (2/7/22)  · Reported Home SMBG  o Blood sugar readings above goal despite starting Valeda Britany  This is likely due to him skipping his novolin 70/30 if his blood sugar is < 200 mg/dL  · Complications:  o Microvascular complications: neuropathy, CKD   o Macrovascular complications: peripheral circulatory disorder  o Medication options  - Hx CKD  SGLT-2 inhibitor indicated for renal benefits    - Future consideration- He would also benefit from GLP-1 agonist as well for additional weight loss/ A1c lowering  He would need to obtain this through patient assistance program   · Medication Cost  · Approved for [employer] program for 3 months  He was denied medicaid and mailed [employer] program the denial letter  Changes to Medication Regimen: If PCP is in agreement  · Patient has not been injecting Novolin with breakfast if BG < 200 mg/dL  This has resulted in hyperglycemia in the 300's before lunch  Discuss injecting insulin as long as blood sugar is >100 mg/dL and as long as he is about it eat a meal  If he skips breakfast then skip Novolin   He is nervous about injecting any insulin if his blood sugar is < 200 mg/dL so we agreed to starting with a lower dose    · Novolin 70/30 if - 200 mg/dL inject 40 units daily; if -300 inject  80 units daily; if BG > 300 inject 100 units daily  · Ideally, working towards getting patient on fixed dose of Novolin 70 /30  Since patient is eating 3 meals daily we can also transition him from BID dosing to TID dosing  At that point we can discontinue sulfonylurea  2  Need for Additional Therapies:   Statin: atorvastatin 40 mg daily  Last LDL 70 which is at goal     ACEI/ARB: on lisinopril 10 mg daily  Microalbuminuria present on last urine chem   ASA: yes    Neuropathy: Gabapentin 300 mg - 2 caps TID     3  Health Maintenance:   Eye Exam: up to date; last completed 4/26/21   Foot Exam: up to date; last completed 2/25/22   Urine Microalbumin: due; lab already ordered    4  Medication Reconciliation:   · Medication list reviewed with pt at today's visit   Medication list updated to reflect medications pt is currently taking    Monitoring: testing 4x daily    Follow-up: 2 weeks    Subjective     1  Medication Adherence/ Tolerability:   Novolin 70/30 mix vial-   o If BG < 200 mg/dL he will do 0 units   o If - 300 mg/dL he will do 80 units   o If BG > 300 mg/dL then will do 100 units    Glipizide- metformin 5-500 mg- 2 tabs BID (lunch and before bed)    Dapagliflozin 5 mg daily    Review of Systems   All other systems reviewed and are negative  Medications Tried in Past:  Janumet- cost constraints     2  Lifestyle:   · Wakes up: 9 AM  · Breakfast: cereal small (~1/2 cup fruit loops) with 2% milk   · Lunch: TV dinners or left overs    · Dinner: chicken and vegetables (green beans, corn)  · Bedtime: banana   · Snacks: nuts   · Beverages: lemonade crystal light   · Exercise: no formal exercise right now  In summer cut grass but on riding   Walks when he goes grocery shopping  Encouraged increased physical activity  Goal is 150 min / week exercise     3   Home monitoring devices   Glucometer: Yes,    CGM: No, Patient would benefit however is only on BID injections (insurance requires TID injections   BP monitor: unknown     Objective     Lab Results   Component Value Date    HGBA1C 9 5 (H) 02/07/2022    HGBA1C 9 2 (H) 11/04/2021    HGBA1C 9 4 (H) 08/31/2021       Blood Glucose Readings  The patient is currently checking blood glucose 4 times per day  Patient reports with SMBG logs  Date AM Lunch Dinner Post-  Dinner   4/12 291 126 278 190   4/13 222 110 234 272   4/14 272 196 159 190   4/16 172  Injected0 units 374 174 160   4/17 219 96 143 195   4/18 332 212 173 190   4/16 259 189 155 179   4/20 270 215 283 195   4/21 125   Injected0 units 373 259 190   4/22 139  Injected0 units 249 210 175   4/23 173  Injected0 units 329 175 210   4/24 226 194 131 180   4/25 239 208 225 190   4/26 222 182 287 185   4/28 279 210 85 162   4/29 217 204 119 187   4/30 274 93 66 183   5/1 247 160 249 183   5/2 169  Injected0 units 332 146 196   5/3 221 171     Avg 228 221 187 190     Hypoglycemia: The patient had 1 episodes/symptoms of hypoglycemia  ASCVD Risk:  The 10-year ASCVD risk score (Marta Frey et al , 2013) is: 13%    Values used to calculate the score:      Age: 62 years      Sex: Male      Is Non- : No      Diabetic: Yes      Tobacco smoker: No      Systolic Blood Pressure: 435 mmHg      Is BP treated: Yes      HDL Cholesterol: 60 mg/dL      Total Cholesterol: 145 mg/dL     Vitals: There were no vitals filed for this visit      Labs:    Lab Results   Component Value Date    SODIUM 134 (L) 02/07/2022    K 5 1 02/07/2022    EGFR 42 02/07/2022    CREATININE 1 72 (H) 02/07/2022    GLUF 229 (H) 02/07/2022         Pharmacist Tracking Tool     Reason For Outreach  Embedded Pharmacist    Demographics  Interaction Method: Phone  Type of Intervention: Follow-Up    Topic(s) Addressed  Diabetes    Intervention(s) Made    Pharmacologic:    -- Medication Adjustment - Dose or Frequency    Non-Pharmacologic:    -- Disease state education  -- Home monitoring discussed or provided  -- Lifestyle education    Tool(s) Used  Not Applicable    Time Spent:   Time Spent in Direct Patient Care: 30 minutes  Time Spent in Care Coordination: 5 minutes    Recommendations  Recipient: Provider  Outcome:  All Accepted

## 2022-05-09 ENCOUNTER — APPOINTMENT (OUTPATIENT)
Dept: LAB | Facility: CLINIC | Age: 62
End: 2022-05-09
Payer: COMMERCIAL

## 2022-05-09 DIAGNOSIS — E11.51 TYPE II DIABETES MELLITUS WITH PERIPHERAL CIRCULATORY DISORDER (HCC): ICD-10-CM

## 2022-05-09 DIAGNOSIS — E29.1 HYPOGONADISM IN MALE: ICD-10-CM

## 2022-05-09 LAB
CREAT UR-MCNC: 107 MG/DL
EST. AVERAGE GLUCOSE BLD GHB EST-MCNC: 212 MG/DL
HBA1C MFR BLD: 9 %
MICROALBUMIN UR-MCNC: 56.8 MG/L (ref 0–20)
MICROALBUMIN/CREAT 24H UR: 53 MG/G CREATININE (ref 0–30)

## 2022-05-09 PROCEDURE — 83036 HEMOGLOBIN GLYCOSYLATED A1C: CPT

## 2022-05-09 PROCEDURE — 3060F POS MICROALBUMINURIA REV: CPT | Performed by: FAMILY MEDICINE

## 2022-05-09 PROCEDURE — 82043 UR ALBUMIN QUANTITATIVE: CPT | Performed by: PHARMACIST

## 2022-05-09 PROCEDURE — 84402 ASSAY OF FREE TESTOSTERONE: CPT

## 2022-05-09 PROCEDURE — 3052F HG A1C>EQUAL 8.0%<EQUAL 9.0%: CPT | Performed by: FAMILY MEDICINE

## 2022-05-09 PROCEDURE — 82570 ASSAY OF URINE CREATININE: CPT | Performed by: PHARMACIST

## 2022-05-09 PROCEDURE — 36415 COLL VENOUS BLD VENIPUNCTURE: CPT

## 2022-05-09 PROCEDURE — 84403 ASSAY OF TOTAL TESTOSTERONE: CPT

## 2022-05-10 LAB
TESTOST FREE SERPL-MCNC: 26.8 PG/ML (ref 6.6–18.1)
TESTOST SERPL-MCNC: 192 NG/DL (ref 264–916)

## 2022-05-18 ENCOUNTER — CLINICAL SUPPORT (OUTPATIENT)
Dept: FAMILY MEDICINE CLINIC | Facility: CLINIC | Age: 62
End: 2022-05-18

## 2022-05-18 DIAGNOSIS — E11.8 TYPE 2 DIABETES MELLITUS WITH COMPLICATION (HCC): Primary | ICD-10-CM

## 2022-05-18 NOTE — PROGRESS NOTES
[address] Pharmacist    Jack Arnold is a 62 y o  male who was referred to the pharmacist for DM education and management, referred by Ca Cartagena MD      Telemedicine consent  The patient was identified by name and date of birth  Jack Arnold was informed that this is a telemedicine visit and that the visit is being conducted through Telephone  My office door was closed  No one else was in the room  He acknowledged consent and understanding of privacy and security of the video platform  The patient has agreed to participate and understands they can discontinue the visit at any time  Assessment/ Plan     1  Type 2 Diabetes:   Goal A1c <7% based on ADA guidelines  Most recent A1c above goal at 9 0% (5/9/22)  · Reported Home SMBG  o Blood sugar readings are improving since patient is more consistently taking Novolin mix 70/30 with breakfast and dinner  No longer skipping dose if BG < 200 mg/dL although he does take a lower dose then  · Complications:  o Microvascular complications: neuropathy, CKD   o Macrovascular complications: peripheral circulatory disorder  o Medication options  - Hx CKD  SGLT-2 inhibitor indicated for renal benefits    - Future consideration- He would also benefit from GLP-1 agonist as well for additional weight loss/ A1c lowering  He would need to obtain this through patient assistance program   · Medication Cost  · Approved for [employer] patient assistance program     Changes to Medication Regimen: If PCP is in agreement  · Occasionally having low blood sugar readings before dinner  He takes Glipizide-metformin with lunch and before bedtime  Glipizide is likely contributing to low blood sugar events especially since he is on a fast acting insulin  Discussed switching to metformin alone  Pt has about 1 5 month supply of glipizide-met combo at home   He wants to use that up first  He will eat a small 15 gram carb snack between lunch and dinner for now  Advised he calls me sooner if he has BG < 70 mg/dL  · Pt ran out of [place] for 6 days now  Program is shipping him a refill and should be here in a few days  2  Need for Additional Therapies:   Statin: atorvastatin 40 mg daily  Last LDL 70 which is at goal     ACEI/ARB: on lisinopril 10 mg daily  Microalbuminuria present on last urine chem   ASA: yes    Neuropathy: Gabapentin 300 mg - 2 caps TID     3  Health Maintenance:   Eye Exam: up to date; last completed 4/26/21   Foot Exam: up to date; last completed 2/25/22   Urine Microalbumin: up to date; last completed 5/9/22    4  Medication Reconciliation:   · Medication list reviewed with pt at today's visit   Medication list updated to reflect medications pt is currently taking    Monitoring: testing 4x daily    Follow-up: 4 weeks    Subjective     1  Medication Adherence/ Tolerability:   Novolin 70/30 mix vial- Injects BID (breakfast and dinner)  o If BG < 200 mg/dL: 40 units   o If - 300 mg/dL: 80 units   o If BG > 300 mg/dL: 100 units    Glipizide- metformin 5-500 mg- 2 tabs BID (lunch and before bed) GFR 42   Dapagliflozin 5 mg daily (ran out completely 6 days ago)    Review of Systems   All other systems reviewed and are negative  Medications Tried in Past:  Janumet- cost constraints     2  Lifestyle:   · Wakes up: 9 AM  · Breakfast: cereal small (~1/2 cup fruit loops) with 2% milk   · Lunch: TV dinners or left overs    · Dinner: chicken and vegetables (green beans, corn)  · Bedtime: banana   · Snacks: nuts   · Beverages: lemonade crystal light   · Exercise: no formal exercise right now  In summer cut grass but on riding   Walks when he goes grocery shopping  Encouraged increased physical activity  Goal is 150 min / week exercise     3   Home monitoring devices   Glucometer: Yes,    CGM: No, Patient would benefit however is only on BID injections (insurance requires TID injections   BP monitor: unknown     Objective     Lab Results   Component Value Date    HGBA1C 9 0 (H) 05/09/2022    HGBA1C 9 5 (H) 02/07/2022    HGBA1C 9 2 (H) 11/04/2021       Blood Glucose Readings  The patient is currently checking blood glucose 4 times per day  Patient reports with SMBG logs  Date AM Lunch Dinner Post-  Dinner   5/4 194 246 105 161   5/5 237 157 177 190   5/6 279 57 111 175   5/8 250 199 62 175   5/9 269 95 120 195   5/10 283 227 61 185   5/11 211 181 234 195   5/12 119 304 164 181   5/13 218 89 175 189   5/14 165 288 74 200   5/15 187 192 247 198   5/16 65 276 162 187   5/17 164 180 245 196   5/18 184 207     Avg 201 193 149 187     Hypoglycemia: The patient had 1 episodes/symptoms of hypoglycemia  ASCVD Risk:  The 10-year ASCVD risk score (Amisha Noriega et al , 2013) is: 13%    Values used to calculate the score:      Age: 62 years      Sex: Male      Is Non- : No      Diabetic: Yes      Tobacco smoker: No      Systolic Blood Pressure: 620 mmHg      Is BP treated: Yes      HDL Cholesterol: 60 mg/dL      Total Cholesterol: 145 mg/dL     Vitals: There were no vitals filed for this visit  Labs:    Lab Results   Component Value Date    SODIUM 134 (L) 02/07/2022    K 5 1 02/07/2022    EGFR 42 02/07/2022    CREATININE 1 72 (H) 02/07/2022    GLUF 229 (H) 02/07/2022    MICROALBCRE 53 (H) 05/09/2022         Pharmacist Tracking Tool     Reason For Outreach  Embedded Pharmacist    Demographics  Interaction Method: Phone  Type of Intervention: Follow-Up    Topic(s) Addressed  Diabetes    Intervention(s) Made      Non-Pharmacologic:    -- Disease state education  -- Home monitoring discussed or provided  -- Lifestyle education    Tool(s) Used  Not Applicable    Time Spent:   Time Spent in Direct Patient Care: 30 minutes  Time Spent in Care Coordination: 5 minutes    Recommendations  Recipient: Provider  Outcome:  All Accepted

## 2022-05-20 ENCOUNTER — OFFICE VISIT (OUTPATIENT)
Dept: FAMILY MEDICINE CLINIC | Facility: CLINIC | Age: 62
End: 2022-05-20
Payer: COMMERCIAL

## 2022-05-20 VITALS
OXYGEN SATURATION: 96 % | RESPIRATION RATE: 18 BRPM | HEIGHT: 70 IN | WEIGHT: 265 LBS | BODY MASS INDEX: 37.94 KG/M2 | SYSTOLIC BLOOD PRESSURE: 134 MMHG | DIASTOLIC BLOOD PRESSURE: 78 MMHG | TEMPERATURE: 99.1 F | HEART RATE: 84 BPM

## 2022-05-20 DIAGNOSIS — I10 PRIMARY HYPERTENSION: ICD-10-CM

## 2022-05-20 DIAGNOSIS — E11.51 TYPE II DIABETES MELLITUS WITH PERIPHERAL CIRCULATORY DISORDER (HCC): ICD-10-CM

## 2022-05-20 DIAGNOSIS — E11.42 DIABETIC POLYNEUROPATHY ASSOCIATED WITH TYPE 2 DIABETES MELLITUS (HCC): Primary | ICD-10-CM

## 2022-05-20 DIAGNOSIS — Z00.00 MEDICARE ANNUAL WELLNESS VISIT, INITIAL: ICD-10-CM

## 2022-05-20 DIAGNOSIS — N18.31 STAGE 3A CHRONIC KIDNEY DISEASE (HCC): ICD-10-CM

## 2022-05-20 DIAGNOSIS — E78.2 MIXED HYPERLIPIDEMIA: ICD-10-CM

## 2022-05-20 PROCEDURE — 99214 OFFICE O/P EST MOD 30 MIN: CPT | Performed by: FAMILY MEDICINE

## 2022-05-20 PROCEDURE — 3008F BODY MASS INDEX DOCD: CPT | Performed by: FAMILY MEDICINE

## 2022-05-20 PROCEDURE — 3078F DIAST BP <80 MM HG: CPT | Performed by: FAMILY MEDICINE

## 2022-05-20 PROCEDURE — 1036F TOBACCO NON-USER: CPT | Performed by: FAMILY MEDICINE

## 2022-05-20 PROCEDURE — 3075F SYST BP GE 130 - 139MM HG: CPT | Performed by: FAMILY MEDICINE

## 2022-05-20 NOTE — ASSESSMENT & PLAN NOTE
Lab Results   Component Value Date    EGFR 42 02/07/2022    EGFR 52 11/04/2021    EGFR 54 02/26/2019    CREATININE 1 72 (H) 02/07/2022    CREATININE 1 46 (H) 11/04/2021    CREATININE 1 43 (H) 09/18/2021     Stable  Continue current

## 2022-05-20 NOTE — PROGRESS NOTES
Assessment/Plan:    Diabetic polyneuropathy associated with type 2 diabetes mellitus ([facility] )    Lab Results   Component Value Date    HGBA1C 9 0 (H) 05/09/2022     A1c not at goal but improving  Type II diabetes mellitus with peripheral circulatory disorder ([facility])    Lab Results   Component Value Date    HGBA1C 9 0 (H) 05/09/2022     A1c not at goal, but improving  Hypertension  Stable  Continue current  Stage 3a chronic kidney disease [facility])  Lab Results   Component Value Date    EGFR 42 02/07/2022    EGFR 52 11/04/2021    EGFR 54 02/26/2019    CREATININE 1 72 (H) 02/07/2022    CREATININE 1 46 (H) 11/04/2021    CREATININE 1 43 (H) 09/18/2021     Stable  Continue current  Hyperlipidemia  Stable  Continue current  Diagnoses and all orders for this visit:    Diabetic polyneuropathy associated with type 2 diabetes mellitus ([facility] )    Type II diabetes mellitus with peripheral circulatory disorder ([place] [address] )    Primary hypertension    Stage 3a chronic kidney disease ([place] [address] )    Mixed hyperlipidemia    Medicare annual wellness visit, initial          Subjective:      Patient ID: Andrew Hough is a 62 y o  male  His A1c is down to 9 0%  He has no side effects and no hypoglycemia  His BP is at goal on his current regimen  He has no CP or SOB  He has no HA or vision changes  He has no PND or orthopnea  His lipids are at goal on his current regimen  He has normal LFTs and no myalgia or muscle weakness  The following portions of the patient's history were reviewed and updated as appropriate:   He  has no past medical history on file    He   Patient Active Problem List    Diagnosis Date Noted    Type II diabetes mellitus with peripheral circulatory disorder ([place] [address] ) 02/24/2022    Stage 3a chronic kidney disease ([facility] ) 11/19/2021    Obesity, morbid ([place] [address] ) 08/06/2021    Medicare annual wellness visit, initial 07/31/2019    Diabetic polyneuropathy associated with type 2 diabetes mellitus ([facility] ) 03/29/2019    Diabetic foot ulcer with osteomyelitis ([facility] ) 09/28/2018    Hyperlipidemia 09/28/2018    Hypertension 09/28/2018     He  has a past surgical history that includes Appendectomy; Back surgery; Elbow surgery; Tonsillectomy; and Wrist surgery  His family history includes Diabetes in his father and mother; Heart disease in his mother; Stroke in his brother and sister  He  reports that he has quit smoking  He quit after 30 00 years of use  He has never used smokeless tobacco  He reports that he does not drink alcohol and does not use drugs  Current Outpatient Medications   Medication Sig Dispense Refill    aspirin 325 mg tablet Take 325 mg by mouth daily      atorvastatin (LIPITOR) 40 mg tablet take 1 tablet by mouth once daily 90 tablet 3    Blood Glucose Monitoring Suppl (OneTouch Verio Flex System) w/Device KIT USE AS DIRECTED BY MD TESTING FOUR TIMES DAILY      cyanocobalamin 1,000 mcg/mL Inject 1 mL (1,000 mcg total) into a muscle every 30 (thirty) days 10 mL 5    Dapagliflozin Propanediol (Farxiga) 5 MG TABS Take 1 tablet (5 mg total) by mouth daily      diphenhydrAMINE-acetaminophen (TYLENOL PM)  MG TABS Take 2 tablets by mouth daily at bedtime as needed for sleep      gabapentin (NEURONTIN) 300 mg capsule TAKE 2 CAPSULES BY MOUTH 3 TIMES A  capsule 1    glipiZIDE-metFORMIN (METAGLIP) 5-500 MG per tablet Take 2 tablets by mouth 2 (two) times a day before meals 360 tablet 3    glucose blood (OneTouch Verio) test strip Check blood sugars four times daily  Please substitute with appropriate alternative as covered by patient's insurance   Dx: E11 65 400 each 3    ibuprofen (MOTRIN) 800 mg tablet take 1 tablet by mouth every 6 hours if needed for mild pain 120 tablet 5    lisinopril (ZESTRIL) 10 mg tablet take 1 tablet by mouth once daily 90 tablet 3    NovoLIN 70/30 ReliOn (70-30) 100 UNIT/ML subcutaneous injection Inject 75 units twice daily (Patient taking differently: Inject 75- 100 units twice daily) 50 mL 12    OneTouch Delica Lancets 55F MISC Check blood sugars four times daily  Please substitute with appropriate alternative as covered by patient's insurance  Dx: E11 65 400 each 3    Syringe/Needle, Disp, (SYRINGE 3CC/20GX1") 20G X 1" 3 ML MISC Use every 30 (thirty) days 100 each 0    testosterone (ANDROGEL) 1 62 % Apply 1 packet (20 25 mg total) topically daily (Patient not taking: No sig reported) 30 packet 5     No current facility-administered medications for this visit  Current Outpatient Medications on File Prior to Visit   Medication Sig    aspirin 325 mg tablet Take 325 mg by mouth daily    atorvastatin (LIPITOR) 40 mg tablet take 1 tablet by mouth once daily    Blood Glucose Monitoring Suppl (OneTouch Verio Flex System) w/Device KIT USE AS DIRECTED BY MD TESTING FOUR TIMES DAILY    cyanocobalamin 1,000 mcg/mL Inject 1 mL (1,000 mcg total) into a muscle every 30 (thirty) days    Dapagliflozin Propanediol (Farxiga) 5 MG TABS Take 1 tablet (5 mg total) by mouth daily    diphenhydrAMINE-acetaminophen (TYLENOL PM)  MG TABS Take 2 tablets by mouth daily at bedtime as needed for sleep    gabapentin (NEURONTIN) 300 mg capsule TAKE 2 CAPSULES BY MOUTH 3 TIMES A DAY    glipiZIDE-metFORMIN (METAGLIP) 5-500 MG per tablet Take 2 tablets by mouth 2 (two) times a day before meals    glucose blood (OneTouch Verio) test strip Check blood sugars four times daily  Please substitute with appropriate alternative as covered by patient's insurance  Dx: E11 65    ibuprofen (MOTRIN) 800 mg tablet take 1 tablet by mouth every 6 hours if needed for mild pain    lisinopril (ZESTRIL) 10 mg tablet take 1 tablet by mouth once daily    NovoLIN 70/30 ReliOn (70-30) 100 UNIT/ML subcutaneous injection Inject 75 units twice daily (Patient taking differently: Inject 75- 100 units twice daily)    OneTouch Delica Lancets 38X MISC Check blood sugars four times daily   Please substitute with appropriate alternative as covered by patient's insurance  Dx: E11 65    Syringe/Needle, Disp, (SYRINGE 3CC/20GX1") 20G X 1" 3 ML MISC Use every 30 (thirty) days    testosterone (ANDROGEL) 1 62 % Apply 1 packet (20 25 mg total) topically daily (Patient not taking: No sig reported)     No current facility-administered medications on file prior to visit  He has No Known Allergies       Review of Systems   All other systems reviewed and are negative  Objective:      /78 (BP Location: Left arm, Patient Position: Sitting, Cuff Size: Large)   Pulse 84   Temp 99 1 °F (37 3 °C) (Temporal)   Resp 18   Ht 5' 10" (1 778 m)   Wt 120 kg (265 lb)   SpO2 96%   BMI 38 02 kg/m²          Physical Exam  Vitals and nursing note reviewed  Constitutional:       Appearance: Normal appearance  He is obese  Cardiovascular:      Rate and Rhythm: Normal rate and regular rhythm  Pulses: Normal pulses  Heart sounds: Normal heart sounds  Pulmonary:      Effort: Pulmonary effort is normal       Breath sounds: Normal breath sounds  Abdominal:      General: Abdomen is flat  Bowel sounds are normal       Palpations: Abdomen is soft  Musculoskeletal:         General: Normal range of motion  Cervical back: Normal range of motion and neck supple  Skin:     General: Skin is warm and dry  Capillary Refill: Capillary refill takes less than 2 seconds  Neurological:      General: No focal deficit present  Mental Status: He is alert and oriented to person, place, and time  Mental status is at baseline  Psychiatric:         Mood and Affect: Mood normal          Behavior: Behavior normal          Thought Content:  Thought content normal          Judgment: Judgment normal

## 2022-05-25 ENCOUNTER — RA CDI HCC (OUTPATIENT)
Dept: OTHER | Facility: HOSPITAL | Age: 62
End: 2022-05-25

## 2022-05-25 NOTE — PROGRESS NOTES
e11 22  [facility]  coding opportunities          Chart Reviewed number of suggestions sent to Provider: 1     Patients Insurance     Medicare Insurance: Jarrett Lozano

## 2022-06-20 ENCOUNTER — TELEPHONE (OUTPATIENT)
Dept: FAMILY MEDICINE CLINIC | Facility: CLINIC | Age: 62
End: 2022-06-20

## 2022-06-20 DIAGNOSIS — E11.51 TYPE II DIABETES MELLITUS WITH PERIPHERAL CIRCULATORY DISORDER (HCC): Primary | ICD-10-CM

## 2022-06-20 NOTE — PROGRESS NOTES
[address] Pharmacist    Alia Li is a 65 y o  male who was referred to the pharmacist for DM education and management, referred by Barb Small MD      Telemedicine consent  The patient was identified by name and date of birth  Alia Li was informed that this is a telemedicine visit and that the visit is being conducted through Telephone  My office door was closed  No one else was in the room  He acknowledged consent and understanding of privacy and security of the video platform  The patient has agreed to participate and understands they can discontinue the visit at any time  Assessment/ Plan     1  Type 2 Diabetes:   Goal A1c <7% based on ADA guidelines  Most recent A1c above goal at 9 0% (5/9/22)  · Reported Home SMBG  o Blood sugar readings are improving since patient is more consistently taking Novolin mix 70/30 with breakfast and dinner  No longer skipping dose if BG < 200 mg/dL although he does take a lower dose then   o Experiencing readings in the 60's before dinner  He takes his glipizide-metformin with lunch  Sulfonylurea in combination with meal time insulin likely contributing to hypoglycemia events  · Complications:  o Microvascular complications: neuropathy, CKD   o Macrovascular complications: peripheral circulatory disorder  o Medication options  - Hx CKD  SGLT-2 inhibitor indicated for renal benefits    - Future consideration- He would also benefit from GLP-1 agonist as well for additional weight loss/ A1c lowering  He would need to obtain this through patient assistance program   · Medication Cost  · Approved for [employer]'s patient assistance program     Changes to Medication Regimen: If PCP is in agreement  · Discontinue glipizide due to hypoglycemia events before dinner (patient takes glipizide with lunch)   Script sent to pharmacy for metformin therapy alone 1000 mg BID  · Increase Farxiga to 10 mg daily   · Repeat CMP ordered to evaluate kidney function  Last GFR slightly below 45 however baseline has been in the 50's  2  Need for Additional Therapies:   Statin: atorvastatin 40 mg daily  Last LDL 70 which is at goal     ACEI/ARB: on lisinopril 10 mg daily  Microalbuminuria present on last urine chem   ASA: yes    Neuropathy: Gabapentin 300 mg - 2 caps TID     3  Health Maintenance:   Eye Exam: up to date; last completed 4/26/21   Foot Exam: up to date; last completed 2/25/22   Urine Microalbumin: up to date; last completed 5/9/22    4  Medication Reconciliation:   · Medication list reviewed with pt at today's visit   Medication list updated to reflect medications pt is currently taking    Monitoring: testing 4x daily, Repeat CMP ordered     Follow-up: 4 weeks    Subjective     1  Medication Adherence/ Tolerability:   Novolin 70/30 mix vial- Injects BID (breakfast and dinner)  o If BG < 200 mg/dL: 40 units   o If - 300 mg/dL: 80 units   o If BG > 300 mg/dL: 100 units    Glipizide- metformin 5-500 mg- 2 tabs BID (lunch and before bed) GFR 42   Dapagliflozin 5 mg daily    Review of Systems   All other systems reviewed and are negative  Medications Tried in Past:  Janumet- cost constraints     2  Lifestyle:   · Wakes up: 9 AM  · Breakfast: cereal small (~1/2 cup fruit loops) with 2% milk   · Lunch: TV dinners or left overs    · Dinner: chicken and vegetables (green beans, corn)  · Bedtime: banana   · Snacks: nuts   · Beverages: lemonade crystal light   · Exercise: no formal exercise right now  In summer cut grass but on riding   Walks when he goes grocery shopping  Encouraged increased physical activity  Goal is 150 min / week exercise     3   Home monitoring devices   Glucometer: Yes,    CGM: No, Patient would benefit however is only on BID injections (insurance requires TID injections   BP monitor: unknown     Objective     Lab Results   Component Value Date    HGBA1C 9 0 (H) 05/09/2022    HGBA1C 9 5 (H) 02/07/2022    HGBA1C 9 2 (H) 11/04/2021       Blood Glucose Readings  The patient is currently checking blood glucose 4 times per day  Patient reports with SMBG logs  Date AM Lunch Dinner Post-  Dinner   5/21 111 280 109 175   5/22 218 188 228 195   5/23 179 252 141 198   5/24 204 178 64 281   5/25 221 131 182 194   5/26 154 262 225 184   5/27 177 218 260 195   5/28 102 225 203 165   5/29 180 161 135 185   5/30 235 105 149 174   5/31 335 105 217 172   6/1 173 235 62 185   6/2 196 227 137 195   6/3 246 123 62 175   6/4 224 197 154 183   6/5 179 276 153 180   6/6 253 115 137 165   6/7 118 253 159 181   6/8 178 285 134 172   6/9 233 125 176 192   6/10 296 112 114 165   6/11 256 148 136 158   6/12 299 190 305 181   6/13 109 249 103 162   6/14 169 281 170 185   6/15 225 112 177 195   6/16 260 85 154 172   6/17 222 71 171 161   6/18 256 101 224 179   6/19 179 233 219 190   Avg 206 184 162 183     Hypoglycemia: The patient had 3 episodes/symptoms of hypoglycemia  ASCVD Risk:  The 10-year ASCVD risk score (Manan Lowe et al , 2013) is: 14 7%    Values used to calculate the score:      Age: 65 years      Sex: Male      Is Non- : No      Diabetic: Yes      Tobacco smoker: No      Systolic Blood Pressure: 105 mmHg      Is BP treated: Yes      HDL Cholesterol: 60 mg/dL      Total Cholesterol: 145 mg/dL     Vitals: There were no vitals filed for this visit      Labs:    Lab Results   Component Value Date    SODIUM 134 (L) 02/07/2022    K 5 1 02/07/2022    EGFR 42 02/07/2022    CREATININE 1 72 (H) 02/07/2022    GLUF 229 (H) 02/07/2022    MICROALBCRE 53 (H) 05/09/2022         Pharmacist Tracking Tool     Reason For Outreach  Embedded Pharmacist    Demographics  Interaction Method: Phone  Type of Intervention: Follow-Up    Topic(s) Addressed  Diabetes    Intervention(s) Made    Pharmacologic:    -- Medication Initiation  -- Medication Discontinuation  -- Medication Adjustment - Dose or Frequency    Non-Pharmacologic:    -- Disease state education  -- Home monitoring discussed or provided  -- Lifestyle education    Tool(s) Used  Not Applicable    Time Spent:   Time Spent in Direct Patient Care: 45 minutes  Time Spent in Care Coordination: 15 minutes    Recommendations  Recipient: Provider  Outcome:  All Accepted

## 2022-06-22 ENCOUNTER — TELEPHONE (OUTPATIENT)
Dept: FAMILY MEDICINE CLINIC | Facility: CLINIC | Age: 62
End: 2022-06-22

## 2022-06-22 NOTE — PROGRESS NOTES
Reason for outreach:  Contacted [facility] to confirm receipt of updated script for Farxiga to 10 mg tabs  Sent to pharmacy and in processing - should take 10 business days       Pharmacist Tracking Tool    Reason For Outreach  Embedded Pharmacist    Demographics  Interaction Method: Phone  Type of Intervention: Follow-Up    Topic(s) Addressed  Diabetes    Intervention(s) Made      Non-Pharmacologic:    -- Care coordination    Tool(s) Used  Not Applicable    Time Spent:   Time Spent in Direct Patient Care: 0 minutes  Time Spent in Care Coordination: 45 minutes    Recommendations  Recipient: Not Applicable  Outcome: Not Applicable

## 2022-07-16 DIAGNOSIS — E11.51 TYPE II DIABETES MELLITUS WITH PERIPHERAL CIRCULATORY DISORDER (HCC): ICD-10-CM

## 2022-07-21 ENCOUNTER — CLINICAL SUPPORT (OUTPATIENT)
Dept: FAMILY MEDICINE CLINIC | Facility: CLINIC | Age: 62
End: 2022-07-21

## 2022-07-21 DIAGNOSIS — E11.8 TYPE 2 DIABETES MELLITUS WITH COMPLICATION (HCC): Primary | ICD-10-CM

## 2022-07-21 NOTE — PROGRESS NOTES
[address] Pharmacist    Charley Lebron is a 65 y o  male who was referred to the pharmacist for DM education and management, referred by Kirstin Sharma MD      Telemedicine consent  The patient was identified by name and date of birth  Charley Lebron was informed that this is a telemedicine visit and that the visit is being conducted through Telephone  My office door was closed  No one else was in the room  He acknowledged consent and understanding of privacy and security of the video platform  The patient has agreed to participate and understands they can discontinue the visit at any time  Assessment/ Plan     1  Type 2 Diabetes:   Goal A1c <7% based on ADA guidelines  Most recent A1c above goal at 9 0% (5/9/22)  · Reported Home SMBG  o Blood sugar readings are improving since patient is more consistently taking Novolin mix 70/30 with breakfast and dinner  No longer skipping dose if BG < 200 mg/dL although he does take a lower dose then  He does not always inject with food which we discussed increased his risk for low blood sugar events  · Complications:  o Microvascular complications: neuropathy, CKD   o Macrovascular complications: peripheral circulatory disorder  o Medication options  - Hx CKD  SGLT-2 inhibitor indicated for renal benefits    - Future consideration- He would also benefit from GLP-1 agonist as well for additional weight loss/ A1c lowering  He would need to obtain this through patient assistance program   · Medication Cost  · Approved for [employer]'s patient assistance program     Changes to Medication Regimen: If PCP is in agreement  · Continue current medications  He is due for repeat A1c next month  · Spent the majority of the apt today discussing proper insulin timing and administration with regards to meals  He is having decreased hypoglycemia events before dinner since stopping Glipizide   He does continue to have occasional low blood sugar readings right before lunch since he has stopped eating breakfast most days but still injecting Novolin  We discussed the risks with this and advised he eats at least 15-30 grams of carbs for breakfast with his injection  If he does not regularly eat breakfast then we can also adjust timing of insulin to be with lunch instead of breakfast  He does not want to inject with lunch for now  2  Need for Additional Therapies:   Statin: atorvastatin 40 mg daily  Last LDL 70 which is at goal     ACEI/ARB: on lisinopril 10 mg daily  Microalbuminuria present on last urine chem   ASA: yes    Neuropathy: Gabapentin 300 mg - 2 caps TID     3  Health Maintenance:   Eye Exam: up to date; last completed 4/26/21   Foot Exam: up to date; last completed 2/25/22   Urine Microalbumin: up to date; last completed 5/9/22    4  Medication Reconciliation:   · Medication list reviewed with pt at today's visit   Medication list updated to reflect medications pt is currently taking    Monitoring: testing 4x daily, Repeat CMP ordered     Follow-up: apt with PCP     Subjective     1  Medication Adherence/ Tolerability:   Novolin 70/30 mix vial- Injects BID (breakfast and dinner)  o If BG < 200 mg/dL: 30-40 units   o If - 300 mg/dL: 80 units   o If BG > 300 mg/dL: 100 units    metformin 1000 mg-1 tab BID GFR 42   Dapagliflozin 10 mg daily    Review of Systems   Endocrine: Negative for polydipsia, polyphagia and polyuria  All other systems reviewed and are negative  Medications Tried in Past:  Janumet- cost constraints     2  Lifestyle:   · Wakes up: 5 AM  · Lost 10 pounds since we talked last   Eating more salad  · Breakfast: has not been eating breakfast recently   Doesn't feel hungry in the morning   · Lunch (12 PM): salad    · Dinner: lopez steak , mashed potatoes (fistful), string beans chicken and vegetables (green beans, corn)  · Bedtime: handful of grapes   · Beverages: lemonade crystal light   · Exercise: no formal exercise right now  In summer cut grass but on riding   Walks when he goes grocery shopping  Encouraged increased physical activity  Goal is 150 min / week exercise     3  Home monitoring devices   Glucometer: Yes,    CGM: No, Patient would benefit however is only on BID injections (insurance requires TID injections   BP monitor: unknown     Objective     Lab Results   Component Value Date    HGBA1C 9 0 (H) 05/09/2022    HGBA1C 9 5 (H) 02/07/2022    HGBA1C 9 2 (H) 11/04/2021       Blood Glucose Readings  The patient is currently checking blood glucose 4 times per day  Patient reports with SMBG logs  Date AM Lunch Dinner Post-  Dinner   6/21 246 140 244 183   6/22 233 64 101 153   6/23 227 168 155 172   6/24 244 71 155 183   6/25 278 72 184 172   6/26 274 167 175 155   6/27 270 93 121 153   6/28 242 128 172 165   6/29 266 88 202 175   6/30 278 117 121 165   7/1 237 79 150 171   7/2 184 326 262 170   7/3 141 253 219 165   7/4 202 162 177 165   7/5 201 155 209 172   7/6 207 125 282 171   7/7 221 60 264 167   7/8 248 158 174 162   7/9 313 208 231 139   7/10 345 335 252 196   7/11 400 323 215 186   7/12 139 289 108 145   7/13 238 107 232 172   7/14 150 243 164 195   7/15 198 190 164 170   7/16 257 131 137 165   7/17 292 103 68 107   7/18 169 166 138 172   Avg 239 161 181 166     Hypoglycemia: The patient had 3 episodes/symptoms of hypoglycemia  ASCVD Risk:  The 10-year ASCVD risk score (Germania Blank et al , 2013) is: 14 7%    Values used to calculate the score:      Age: 65 years      Sex: Male      Is Non- : No      Diabetic: Yes      Tobacco smoker: No      Systolic Blood Pressure: 451 mmHg      Is BP treated: Yes      HDL Cholesterol: 60 mg/dL      Total Cholesterol: 145 mg/dL     Vitals: There were no vitals filed for this visit      Labs:    Lab Results   Component Value Date    SODIUM 134 (L) 02/07/2022    K 5 1 02/07/2022    EGFR 42 02/07/2022    CREATININE 1 72 (H) 02/07/2022    GLUF 229 (H) 02/07/2022    MICROALBCRE 53 (H) 05/09/2022         Pharmacist Tracking Tool     Pharmacist Tracking Tool  Reason For Outreach: Embedded Pharmacist  Demographics:  Intervention Method: Phone  Type of Intervention: Follow-Up  Topics Addressed: Diabetes  Pharmacologic Interventions: Prevent or Manage SHAE  Non-Pharmacologic Interventions: Disease state education, Home Monitoring and Medication/Device education  Time:  Direct Patient Care: 25 mins  Care Coordination: 15 mins  Recommendation Recipient: Patient/Caregiver  Outcome: Partially Accepted

## 2022-08-01 ENCOUNTER — TELEPHONE (OUTPATIENT)
Dept: FAMILY MEDICINE CLINIC | Facility: CLINIC | Age: 62
End: 2022-08-01

## 2022-08-01 NOTE — TELEPHONE ENCOUNTER
Patient called stating he never got a call from his pharmacy that his metformin was filled  Informed patient that refill for 90 day supply was sent and Receipt confirmed by pharmacy (7/17/2022  8:45 AM EDT)  I also called his pharmacy after our last telephone apt to confirm they received the script  I asked patient if he has called his pharmacy or stopped there to see if metformin is filled  He has not  Advised he calls his pharmacy and speaks with them regarding his metformin       Pharmacist Tracking Tool  Reason For Outreach: Embedded Pharmacist  Demographics:  Intervention Method: Phone  Type of Intervention: Follow-Up  Topics Addressed: Diabetes  Pharmacologic Interventions: N/A  Non-Pharmacologic Interventions: Care coordination  Time:  Direct Patient Care: 5 mins  Care Coordination: 0 mins  Recommendation Recipient: Patient/Caregiver  Outcome: Accepted

## 2022-08-05 ENCOUNTER — APPOINTMENT (OUTPATIENT)
Dept: LAB | Facility: CLINIC | Age: 62
End: 2022-08-05
Payer: COMMERCIAL

## 2022-08-05 DIAGNOSIS — E11.42 DIABETIC POLYNEUROPATHY ASSOCIATED WITH TYPE 2 DIABETES MELLITUS (HCC): ICD-10-CM

## 2022-08-05 DIAGNOSIS — I10 PRIMARY HYPERTENSION: ICD-10-CM

## 2022-08-05 DIAGNOSIS — E78.2 MIXED HYPERLIPIDEMIA: ICD-10-CM

## 2022-08-05 DIAGNOSIS — E11.51 TYPE II DIABETES MELLITUS WITH PERIPHERAL CIRCULATORY DISORDER (HCC): ICD-10-CM

## 2022-08-05 DIAGNOSIS — Z00.00 MEDICARE ANNUAL WELLNESS VISIT, INITIAL: ICD-10-CM

## 2022-08-05 DIAGNOSIS — N18.31 STAGE 3A CHRONIC KIDNEY DISEASE (HCC): ICD-10-CM

## 2022-08-05 LAB
ALBUMIN SERPL BCP-MCNC: 4.1 G/DL (ref 3.5–5)
ALP SERPL-CCNC: 63 U/L (ref 46–116)
ALT SERPL W P-5'-P-CCNC: 45 U/L (ref 12–78)
ANION GAP SERPL CALCULATED.3IONS-SCNC: 7 MMOL/L (ref 4–13)
AST SERPL W P-5'-P-CCNC: 26 U/L (ref 5–45)
BILIRUB SERPL-MCNC: 0.4 MG/DL (ref 0.2–1)
BUN SERPL-MCNC: 26 MG/DL (ref 5–25)
CALCIUM SERPL-MCNC: 10.3 MG/DL (ref 8.3–10.1)
CHLORIDE SERPL-SCNC: 105 MMOL/L (ref 96–108)
CHOLEST SERPL-MCNC: 120 MG/DL
CO2 SERPL-SCNC: 23 MMOL/L (ref 21–32)
CREAT SERPL-MCNC: 1.7 MG/DL (ref 0.6–1.3)
EST. AVERAGE GLUCOSE BLD GHB EST-MCNC: 212 MG/DL
GFR SERPL CREATININE-BSD FRML MDRD: 42 ML/MIN/1.73SQ M
GLUCOSE P FAST SERPL-MCNC: 230 MG/DL (ref 65–99)
HBA1C MFR BLD: 9 %
HDLC SERPL-MCNC: 57 MG/DL
LDLC SERPL CALC-MCNC: 47 MG/DL (ref 0–100)
NONHDLC SERPL-MCNC: 63 MG/DL
POTASSIUM SERPL-SCNC: 5.6 MMOL/L (ref 3.5–5.3)
PROT SERPL-MCNC: 8.1 G/DL (ref 6.4–8.4)
SODIUM SERPL-SCNC: 135 MMOL/L (ref 135–147)
TRIGL SERPL-MCNC: 81 MG/DL
TSH SERPL DL<=0.05 MIU/L-ACNC: 2.45 UIU/ML (ref 0.45–4.5)

## 2022-08-05 PROCEDURE — 36415 COLL VENOUS BLD VENIPUNCTURE: CPT

## 2022-08-05 PROCEDURE — 84402 ASSAY OF FREE TESTOSTERONE: CPT

## 2022-08-05 PROCEDURE — 84443 ASSAY THYROID STIM HORMONE: CPT

## 2022-08-05 PROCEDURE — 80053 COMPREHEN METABOLIC PANEL: CPT

## 2022-08-05 PROCEDURE — 83036 HEMOGLOBIN GLYCOSYLATED A1C: CPT

## 2022-08-05 PROCEDURE — 80061 LIPID PANEL: CPT

## 2022-08-05 PROCEDURE — 84403 ASSAY OF TOTAL TESTOSTERONE: CPT

## 2022-08-06 LAB
TESTOST FREE SERPL-MCNC: 29.7 PG/ML (ref 6.6–18.1)
TESTOST SERPL-MCNC: 230 NG/DL (ref 264–916)

## 2022-08-12 ENCOUNTER — OFFICE VISIT (OUTPATIENT)
Dept: FAMILY MEDICINE CLINIC | Facility: CLINIC | Age: 62
End: 2022-08-12
Payer: COMMERCIAL

## 2022-08-12 VITALS
HEART RATE: 68 BPM | RESPIRATION RATE: 18 BRPM | OXYGEN SATURATION: 93 % | SYSTOLIC BLOOD PRESSURE: 130 MMHG | BODY MASS INDEX: 36.22 KG/M2 | TEMPERATURE: 98.1 F | DIASTOLIC BLOOD PRESSURE: 70 MMHG | HEIGHT: 70 IN | WEIGHT: 253 LBS

## 2022-08-12 DIAGNOSIS — E87.5 HYPERKALEMIA: ICD-10-CM

## 2022-08-12 DIAGNOSIS — R79.89 LOW TESTOSTERONE: ICD-10-CM

## 2022-08-12 DIAGNOSIS — N18.31 STAGE 3A CHRONIC KIDNEY DISEASE (HCC): ICD-10-CM

## 2022-08-12 DIAGNOSIS — Z11.4 SCREENING FOR HIV (HUMAN IMMUNODEFICIENCY VIRUS): ICD-10-CM

## 2022-08-12 DIAGNOSIS — R01.1 HEART MURMUR: ICD-10-CM

## 2022-08-12 DIAGNOSIS — E11.8 TYPE 2 DIABETES MELLITUS WITH COMPLICATION (HCC): Primary | ICD-10-CM

## 2022-08-12 DIAGNOSIS — I10 PRIMARY HYPERTENSION: ICD-10-CM

## 2022-08-12 PROCEDURE — 99214 OFFICE O/P EST MOD 30 MIN: CPT | Performed by: FAMILY MEDICINE

## 2022-08-12 RX ORDER — HUMAN INSULIN 100 [IU]/ML
INJECTION, SUSPENSION SUBCUTANEOUS
Qty: 50 ML | Refills: 12 | Status: SHIPPED | OUTPATIENT
Start: 2022-08-12

## 2022-08-12 NOTE — PROGRESS NOTES
Assessment/Plan:    1  Screening for HIV (human immunodeficiency virus)  - HIV 1/2 Antigen/Antibody (4th Generation) w Reflex [facility]; Future    2  Type 2 diabetes mellitus with complication (HCC)  - not well controlled but hesitant to change regimen  He is on ACE and ARB  Has neuropathy  On gabapentin  Discussed importance of optimizing his BG     - NovoLIN 70/30 ReliOn (70-30) 100 UNIT/ML subcutaneous injection; Inject 75- 100 units twice daily  Dispense: 50 mL; Refill: 12  - Basic metabolic panel; Future    3  Hyperkalemia  - needs a recheck  Likely hemolyzed specimen  Will touch base when I see results  Understands the importance  - Basic metabolic panel; Future    4  Primary hypertension  - stable  Discussed the importance of maintaining a healthy lifestyle through heart healthy diet and exercise  - cont regiment  5  Stage 3a chronic kidney disease ([facility] [address] )  - d/w pt today  He is upset by this, "I thought my kidneys were normal "  He is on ACEi  We discussed the are stable but reduced  We can optimize his diab/HTN, overall health to optimize renal health  6  Low testosterone  - not able to afford supplement  He asked that I message the pharmacist, this is honored in addition to his PCP  7  Heart murmur  - echo on file with some trace regurgitation  No sob, pack, edema  RTC in 3m to see his pcp  Patient/Caretaker verbalized understanding and were in agreement with today's assessment and plan  Time was taken to address any questions patient/caretaker had  Indication/Risks/Benefits of medication(s) as prescribed were discussed with the patient/caretaker  The patient verbalized understanding and agreement and elects to take medications as prescribed  Time was taken to answer any questions the patient/caretaker may have had            Chief Complaint   Patient presents with    Follow-up     PATIENT ADAMANTLY REFUSES MED WELL       Subjective:      Patient ID: Autsin Gibbs Bridger Ellison is a 65 y o  male  REFUSES AMW VISIT  DM - on insulin and at times he does get in the 60s and he is able to self tx  He is taking Novolin 70/30 --  twice daily  He tells me this is what his doctor told him to do  There are times when under 150, he does not take at all  He tells me that every time someone messes with his regimen, he does not do well  When his BG is low, he does not do well  He is down 12 pounds  He has reeled in his diet  He is eating salad with fruits and vegetables  He is on ACEi and statin therapy  He has significant neuropathy  He has chronic low back pain due to trauma - he cannot exercise  HTN - on meds and tolerating    HLD - on statin and tolerating     CKD - stable  No cp, sob, pack, edema  Brother had CVA and he is asking me to check his carotids to be sure they are "clear "  No dizziness, or visual deficits  The following portions of the patient's history were reviewed and updated as appropriate: allergies, current medications, past family history, past medical history, past social history, past surgical history and problem list     Review of Systems   All other systems reviewed and are negative  Objective:      /70   Pulse 68   Temp 98 1 °F (36 7 °C)   Resp 18   Ht 5' 10" (1 778 m)   Wt 115 kg (253 lb)   SpO2 93%   BMI 36 30 kg/m²          Physical Exam  Vitals and nursing note reviewed  Constitutional:       Appearance: Normal appearance  He is obese  HENT:      Head: Normocephalic and atraumatic  Eyes:      Conjunctiva/sclera: Conjunctivae normal       Pupils: Pupils are equal, round, and reactive to light  Neck:      Vascular: Carotid bruit (noted on the R but auscultated murmur at aortic post, potentially radiating - (+) on the L ) present  Cardiovascular:      Rate and Rhythm: Normal rate and regular rhythm  Heart sounds: Murmur (systolic ) heard     Pulmonary:      Effort: Pulmonary effort is normal       Breath sounds: Normal breath sounds  No wheezing, rhonchi or rales  Abdominal:      Palpations: Abdomen is soft  Musculoskeletal:      Cervical back: Neck supple  Comments: Walks with antalgic gait    Lymphadenopathy:      Cervical: No cervical adenopathy  Skin:     General: Skin is warm and dry  Neurological:      General: No focal deficit present  Mental Status: He is alert and oriented to person, place, and time  Psychiatric:         Mood and Affect: Mood normal          Behavior: Behavior normal          Thought Content:  Thought content normal          Judgment: Judgment normal

## 2022-08-12 NOTE — Clinical Note
I saw pt today  He is not able to afford T and is asking to send a message to [facility] about this    Therefore, I honored his request   Levon Call, DO

## 2022-08-19 ENCOUNTER — TRANSITIONAL CARE MANAGEMENT (OUTPATIENT)
Dept: FAMILY MEDICINE CLINIC | Facility: CLINIC | Age: 62
End: 2022-08-19

## 2022-08-22 ENCOUNTER — RA CDI HCC (OUTPATIENT)
Dept: OTHER | Facility: HOSPITAL | Age: 62
End: 2022-08-22

## 2022-08-22 NOTE — PROGRESS NOTES
E11 65, e11 22  [facility]  coding opportunities          Chart Reviewed number of suggestions sent to Provider: 2     Patients Insurance     Medicare Insurance: [employer]

## 2022-08-23 ENCOUNTER — TELEPHONE (OUTPATIENT)
Dept: FAMILY MEDICINE CLINIC | Facility: CLINIC | Age: 62
End: 2022-08-23

## 2022-08-23 NOTE — TELEPHONE ENCOUNTER
[address] Shirley Gomez, Pharmacist    Adriana Resendez is a 65 y o  male who was referred to the pharmacist for Testosterone gel cost education and management, referred by Dr Fernández Congress  Plan       1  High cost Medication Plan  · Called pharmacy and with Prior auth the cost is actually $45 for 30 day supply  · I also called [employer] and they are not accepting new applications for Angrodel through the assistance program    · I did call patient to make him aware  He is not interested in picking up the medication for $45 and is declining therapy at this time  Follow-up: as needed    Assessment     1  Jameel Grullon / Aditi Deras  a  Age >72 yo  b  Resident of [place] for > 90 days   c  PACE Criteria (2022): For a single person, total income must be $14,500 or less  For a  couple, combined total income must be $17,700 or less  d  PACENET Criteria (2022): For a single person, total income can be between $14,500 and $33,500  For a  couple, combined total income can be between $17,700 and $41,500  a  Patient DOES NOT meet the above criteria due to his age  2  Medicare Extra Help  a  Criteria:   b  Full extra help:  i  Income limit: Up to $1,549 ($2,080 for couples) per month  ii  Asset limit: Up to $9,900 ($15,600 for couples)  c  Partial Extra help  i  Income limit: Below $1,719 ($2,309 for couples) per month  ii  Asset limit: Up to $15,510 ($30,950 for couples)  d  Patient DOES NOT meet the above criteria    3  Patient assistance program   a   no longer accepting new applications  Subjective     1  Medication cost-     Patient reports Testosterone gel packets (Angrogel) high cost through pharmacy benefits  Patient reports cost is > $100 for 30 day supply at pharmacy         Pharmacist Tracking Tool     Pharmacist Tracking Tool  Reason For Outreach: Embedded Pharmacist  Demographics:  Intervention Method: Phone  Type of Intervention: New  Topics Addressed: Men's Health  Pharmacologic Interventions: N/A  Non-Pharmacologic Interventions: Chart update and Cost  Time:  Direct Patient Care: 20 mins  Care Coordination: 35 mins  Recommendation Recipient: Patient/Caregiver  Outcome: Declined - Patient

## 2022-08-24 ENCOUNTER — OFFICE VISIT (OUTPATIENT)
Dept: FAMILY MEDICINE CLINIC | Facility: CLINIC | Age: 62
End: 2022-08-24
Payer: COMMERCIAL

## 2022-08-24 VITALS
HEART RATE: 75 BPM | SYSTOLIC BLOOD PRESSURE: 122 MMHG | RESPIRATION RATE: 16 BRPM | DIASTOLIC BLOOD PRESSURE: 76 MMHG | TEMPERATURE: 98 F | WEIGHT: 256 LBS | BODY MASS INDEX: 35.84 KG/M2 | OXYGEN SATURATION: 94 % | HEIGHT: 71 IN

## 2022-08-24 DIAGNOSIS — E11.8 TYPE 2 DIABETES MELLITUS WITH COMPLICATION (HCC): ICD-10-CM

## 2022-08-24 DIAGNOSIS — Z09 HOSPITAL DISCHARGE FOLLOW-UP: Primary | ICD-10-CM

## 2022-08-24 DIAGNOSIS — E29.1 HYPOGONADISM IN MALE: ICD-10-CM

## 2022-08-24 DIAGNOSIS — N18.31 STAGE 3A CHRONIC KIDNEY DISEASE (HCC): ICD-10-CM

## 2022-08-24 DIAGNOSIS — S02.85XA CLOSED FRACTURE OF RIGHT ORBIT, INITIAL ENCOUNTER (HCC): ICD-10-CM

## 2022-08-24 PROCEDURE — 99496 TRANSJ CARE MGMT HIGH F2F 7D: CPT | Performed by: FAMILY MEDICINE

## 2022-08-24 RX ORDER — AMOXICILLIN 500 MG/1
500 CAPSULE ORAL ONCE
COMMUNITY
Start: 2022-08-22

## 2022-08-24 RX ORDER — GINSENG 100 MG
500 CAPSULE ORAL DAILY
COMMUNITY
Start: 2022-08-18 | End: 2022-08-28

## 2022-08-24 RX ORDER — CYCLOBENZAPRINE HCL 10 MG
10 TABLET ORAL 3 TIMES DAILY PRN
COMMUNITY
Start: 2022-08-18

## 2022-08-24 NOTE — PROGRESS NOTES
TCM Call     Date and time call was made  8/19/2022 11:37 AM    Patient was hospitialized at  Other (comment)    [address]    Date of Admission  08/16/22    Date of discharge  08/18/22    Diagnosis  pt said he has pain in face  Disposition  Home    Current Symptoms  None      TCM Call     Post hospital issues  None    Should patient be enrolled in anticoag monitoring? No    Scheduled for follow up?   Yes    Referrals needed  pt said he's being referral to an kidney    Did you obtain your prescribed medications  Yes    Do you need help managing your prescriptions or medications  No    Is transportation to your appointment needed  No    I have advised the patient to call PCP with any new or worsening symptoms  [place]osto    Living Arrangements  Spouse or Significiant other    Are you recieving any outpatient services  No    Are you recieving home care services  No    Are you using any community resources  No    Current waiver services  No    Have you fallen in the last 12 months  No    Interperter language line needed  No    Counseling  Patient

## 2022-08-24 NOTE — PROGRESS NOTES
Assessment/Plan:     1  Type 2 diabetes mellitus with complication (HCC)  - was not optimally controlled at most recent visit but is off metformin  Takes 70/30 and doses as he needs too, per report  He has been doing this  He is also on [address]  He is not on ACE therapy any longer; stopped during hospital stay for Low Bps and NEREYDA on CKD  Will keep him off; BP is well maintained today  Lab Results   Component Value Date    HGBA1C 9 0 (H) 08/05/2022   - HEMOGLOBIN A1C W/ EAG ESTIMATION; Future  - Comprehensive metabolic panel; Future    2  Closed fracture of right orbit, initial encounter [facility])  - he is seeing Dr Jose Martin  Saw him and is now on Abx therapy and pain control  Slowly improving  3  Hypogonadism in male  - asking for me to order f/u testing, seeing Dr Qamar Vines in December  He is not on supplement, we discussed likely low utility in rechecking if not on supplement but he tells me pharmD is helping him with this  - Testosterone, free, total; Future    4  Hospital discharge follow-up  - improving and stable  Seeing nephrology in the near future  Will f/u with Dr Jose Martin again  BMP ordered to cont to monitor his renal function  5  Stage 3a chronic kidney disease ([facility] [address] )  - stable; back to baseline  Off metformin, NSAIDs/ASA and ACE  RTC as scheduled or sooner prn    Patient/Caretaker verbalized understanding and were in agreement with today's assessment and plan  Time was taken to address any questions patient/caretaker had  Indication/Risks/Benefits of medication(s) as prescribed were discussed with the patient/caretaker  The patient verbalized understanding and agreement and elects to take medications as prescribed  Time was taken to answer any questions the patient/caretaker may have had  Chief Complaint   Patient presents with    Transition of Care Management       Subjective:     Patient ID: Sergio ePrry is a 65 y o  male      Was in [place] on 8/16 working and he fell and smashed his face with nasal and orbital fracture  He is without visual deficits  He does not have to f/u with eye doctor but does have f/u with ENT  He was put on abx and pain meds  Dr Chidi Ibrahim gave him 32 tabs of hydrocodone and this is controlling his pain  NEREYDA on CKD -- he is back to baseline  Has appt with Nephrology  This appt is on 8/29  Hospital Course  This is a brief description of the patient's hospital stay; please refer to medical chart for further details  Sapna Rae is 65 y o  male with past medical history as mentioned above   He presented to [facility] on [facility]/16/20[facility] with complaint of fall and facial trauma  He was diagnosed with orbital and nasal fracture, NEREYDA, and hyperkalemia  NEREYDA on CKD stage III - Creatinine improved with IV fluids, patient should follow up with nephrology as outpatient  Creatinine appears back to baseline  Hyperkalemia- resolved  Patient was given dose of Lokelma  Type 2 diabetes mellitus- continue monitor blood sugars at home and insulin twice daily; metformin currently held due to NEREYDA  Please do not take this medication until further evaluation by PCP/ nephrologist   Hypertension-stable chronic condition during admission  Slightly elevated in ED due to pain  Lisinopril was held due to NEREYDA  NEREYDA improved, home dose of lisinopril was restarted today  Closed fracture of nasal bone and of right orbit- Dr Chidi Ibrahim consulted in ED  Stated patient should follow up in office as outpatient early next week  Aspirin on hold until follow up with ENT  Continue pain management with tylenol, avoid ibuprofen  Patient at this time is medically stable for discharge  Patient should follow up with PCP, nephrology, and ENT within 1 week of discharge  Repeat BMP in 1 week  Patient advised to return to ED if symptoms worsen  Patient Name: Sapna Rae  Patient MRN: [id number]  Admitting Provider: No admitting provider for patient encounter    Discharging Provider: Cali Paiz MD  Primary Care Physician at Discharge: Gladis Byrd [phone]   Admission Date: 8/16/2022   Discharge Date: 8/18/2022    Admission Diagnoses   Hyperkalemia [E87 5]  NEREYDA (acute kidney injury) ([place] [address] ) [N17 9]  Closed fracture of right orbit, initial encounter ([place] [address] ) [S02 85XA]  Fall, initial encounter [W19  XXXA]  Acute renal failure, unspecified acute renal failure type ([place] [address] ) [N17 9]    Discharge Diagnoses  Principal Problem:  NEREYDA (acute kidney injury) ([place] [address] )  Active Problems:  Hyperlipidemia  Depression  Anxiety  Essential hypertension  Anemia of chronic disease  Diabetic polyneuropathy associated with type 2 diabetes mellitus (HCC)  Type 2 diabetes mellitus (HCC)  Lumbar degenerative disc disease  YONG (obstructive sleep apnea)  Hyponatremia  Stage 3a chronic kidney disease (HCC)  Primary osteoarthritis involving multiple joints  Closed fracture of nasal bone  Closed fracture of right orbit [facility])      Discharge Instructions and Follow-Ups  Discharge Instructions: Activity: activity as tolerated  Diet: Diabetic Diet and Cardiac Diet   Recommended outpatient tests: Basic metabolic panel in 1 week  Recommended follow ups: The patient should follow up with PCP within 1 week of discharge  No future appointments    Gladis Byrd, [address] [facility] [address] [place] [id number] [facility],[address]  [phone]    Schedule an appointment as soon as possible for a visit in 1 week(s)  hospital follow-up medication review    Dean Judd MD  [address] [facility]  [phone]    Schedule an appointment as soon as possible for a visit in 1 week(s)  hospital follow-up medication review    Derek Green MD  [address] [place] [address] [phone]    Schedule an appointment as soon as possible for a visit in 1 week(s)  hospital follow-up medication review    Medications    Medication List       START taking these medications     bacitracin 500 unit/gram external ointment  Apply topically as needed for wound care  CHANGE how you take these medications     cyclobenzaprine 10 MG tablet  Commonly known as: FLEXERIL  Take 1 tablet (10 mg total) by mouth 3 (three) times a day as needed for muscle spasms  What changed: See the new instructions  CONTINUE taking these medications     acetaminophen 500 MG tablet  Commonly known as: TYLENOL  Take 1 tablet (500 mg total) by mouth every 6 (six) hours as needed for mild pain (pain score 1-3)  amLODIPine 10 MG tablet  Commonly known as: NORVASC  Take 1 tablet (10 mg total) by mouth daily  atorvastatin 40 MG tablet  Commonly known as: LIPITOR    cyanocobalamin 1,000 mcg/mL injection    dapagliflozin 10 mg Tab tablet  Commonly known as: FARXIGA    gabapentin 300 MG capsule  Commonly known as: NEURONTIN    lisinopriL 5 MG tablet  Commonly known as: PRINIVIL,ZESTRIL  Take 2 tablets (10 mg total) by mouth daily  Please do not take this medication until further evaluation by PCP/nephrologist     NOVOLIN 70/30 U-100 INSULIN SUBQ    TYLENOL PM EXTRA STRENGTH  mg Tab  Generic drug: diphenhydrAMINE-acetaminophen          STOP taking these medications     aspirin 325 MG tablet    ibuprofen 800 MG tablet  Commonly known as: MOTRIN    metFORMIN 1000 MG tablet  Commonly known as: GLUCOPHAGE      Where to Get Your Medications       These medications were sent to [address] [place], [place] [id number]     Phone: [phone]   bacitracin 500 unit/gram external ointment  cyclobenzaprine 10 MG tablet      Hospital Course  This is a brief description of the patient's hospital stay; please refer to medical chart for further details  Keturah Pickens is 65 y o  male with past medical history as mentioned above   He presented to [facility] on 8/16/2022 with complaint of fall and facial trauma  He was diagnosed with orbital and nasal fracture, NEREYDA, and hyperkalemia     NEREYDA on CKD stage III - Creatinine improved with IV fluids, patient should follow up with nephrology as outpatient  Creatinine appears back to baseline  Hyperkalemia- resolved  Patient was given dose of Lokelma  Type 2 diabetes mellitus- continue monitor blood sugars at home and insulin twice daily; metformin currently held due to NEREYDA  Please do not take this medication until further evaluation by PCP/ nephrologist   Hypertension-stable chronic condition during admission  Slightly elevated in ED due to pain  Lisinopril was held due to NEREYDA  NEREYDA improved, home dose of lisinopril was restarted today  Closed fracture of nasal bone and of right orbit- Dr Radha Montes De Oca consulted in ED  Stated patient should follow up in office as outpatient early next week  Aspirin on hold until follow up with ENT  Continue pain management with tylenol, avoid ibuprofen  Patient at this time is medically stable for discharge  Patient should follow up with PCP, nephrology, and ENT within 1 week of discharge  Repeat BMP in 1 week  Patient advised to return to ED if symptoms worsen  Chief Complaint   Patient presents with   Fall   Missed curb and fell struck face     He was diagnosed with Hyperkalemia [E87 5]  NEREYDA (acute kidney injury) ([facility] [address] ) [N17 9]  Closed fracture of right orbit, initial encounter ([facility] [address] ) [S02 85XA]  Fall, initial encounter [W19  XXXA]  Acute renal failure, unspecified acute renal failure type ([facility] [address] ) [N17 9]     Consults   IP CONSULT TO ENT  CONSULT TO WOUND HEALING TEAM    Labs, Procedures, and Studies   Important Lab findings:  Please see Hospital Course   Studies:  CT HEAD WO CONTRAST    Result Date: 8/16/2022  IMPRESSION: 1  No evidence of intracranial trauma  2  There are maxillofacial fractures with periorbital and perinasal swelling  See separately dictated CT through the maxilla facial bones  3  No evidence of cervical fracture identified   [id number]    CT CERVICAL SPINE WO CONTRAST    Result Date: 8/16/2022  IMPRESSION: 1  No evidence of intracranial trauma  2  There are maxillofacial fractures with periorbital and perinasal swelling  See separately dictated CT through the maxilla facial bones  3  No evidence of cervical fracture identified  [id number]    CT MAXILOFACIAL    Result Date: 8/16/2022  IMPRESSION: 1  Fractures through the right medial orbital wall, orbital floor, walls of the right maxillary sinus  Fractures of the nasal bones bilaterally and within the osseous nasal septum  2  There is herniation of intraorbital fat into the roof of the right maxillary sinus  There is some minimal extraconal intraorbital hemorrhage along the orbital floor and within the medial aspect of the orbit  3  Suspect some hemorrhage noted along the insertion point of the inferior rectus muscle to the sclera  There is also some minimal inferior retraction of the inferior rectus muscle  Correlate for signs of entrapment  CT examination performed with dose lowering protocol in accordance with MARTHA  [id number]    NO CHARGE CT LUMBAR SPINE    Result Date: 8/16/2022  IMPRESSION: 1  No evidence of acute fracture visualized within the thoracic spine or within the lumbar spine  2  Postsurgical changes within the lower lumbar spine, status post fusion between L4-S1  3  There is remote wedge compression fracture deformity at L1 with roughly 20% wedging anteriorly  CT examination performed with dose lowering protocol in accordance with MARTHA  [id number]    NO CHARGE CT THORACIC SPINE    Result Date: 8/16/2022  IMPRESSION: 1  No evidence of acute fracture visualized within the thoracic spine or within the lumbar spine  2  Postsurgical changes within the lower lumbar spine, status post fusion between L4-S1  3  There is remote wedge compression fracture deformity at L1 with roughly 20% wedging anteriorly  CT examination performed with dose lowering protocol in accordance with MARTHA   [id number]    CT CHEST ABDOMEN AND PELVIS WO CONTRAST    Result Date: 8/16/2022  Impression: 1  No acute traumatic injury to chest, abdomen and pelvis  2  Few small 2 to 3 mm pulmonary nodules  Advise follow-up as per Fleischner guidelines (low risk patient: No routine follow-up required; high risk patient: Optional CT at 12 months)  3  Hepatic steatosis  Refer to separately dictated CT spine report  Workstation:[id number]   Procedures:   None    Discharge Disposition  Home     Code Status at Discharge  Full Code    Physical Exam at Discharge  Condition of Patient on Discharge: stable  Vitals:   08/18/22 0331 08/18/22 0710 08/18/22 0822 08/18/22 1138   BP: 122/68 143/78 151/87   BP Location: Right arm Right arm   Patient Position: Lying Lying   Pulse: 84 88 83   Resp: 20 18 20   Temp: 98 6 °F (37 °C) 97 8 °F (36 6 °C) 97 6 °F (36 4 °C)   TempSrc: Temporal   SpO2: 92% (!) 88% 93% 92%   Weight:   Height:     O2 Device: Room air  Physical Exam  Constitutional:   General: He is not in acute distress  Appearance: He is not diaphoretic  HENT:   Head: Normocephalic  Abrasion, contusion, right periorbital erythema and laceration present  Cardiovascular:   Rate and Rhythm: Normal rate and regular rhythm  Pulmonary:   Effort: Pulmonary effort is normal    Breath sounds: Normal breath sounds  Abdominal:   General: Bowel sounds are normal    Palpations: Abdomen is soft  Neurological:   Mental Status: He is alert and oriented to person, place, and time  Skin:  General: Skin is warm  Time  The above discharge including evaluating the patient took greater than 30 minutes, more than 50% of it was spent counseling the patient and coordinating care by discussing treatment plans and follow ups status post discharge  COPY FORWARD DOCUMENTATION  The patient's hospital course was copied forward and appropriately updated by jesus Mccurdy PA-C      Electronically signed by Elroy Fontenot MD at 08/18/2022 3:15 PM EDT    Associated attestation - Nato Patterson MD - 08/18/2022 3:15 PM EDT  Formatting of this note might be different from the original   I saw and evaluated the patient on 08/18/22 and agree with the APC's findings and plan as documented to include my comments as noted below  HPI    Review of Systems   All other systems reviewed and are negative  Objective:     Physical Exam  Vitals and nursing note reviewed  Constitutional:       Appearance: Normal appearance  HENT:      Mouth/Throat:      Mouth: Mucous membranes are moist    Eyes:      Comments: There is subconjunctival hemorrhage noted to R eye  PEERL  There is facial pain -- orbital fx  There are abrasions and bruising to the R side of his face     Cardiovascular:      Rate and Rhythm: Normal rate and regular rhythm  Pulmonary:      Effort: Pulmonary effort is normal       Breath sounds: Normal breath sounds  No wheezing, rhonchi or rales  Musculoskeletal:      Cervical back: Neck supple  Skin:     General: Skin is warm and dry  Neurological:      General: No focal deficit present  Mental Status: He is alert and oriented to person, place, and time  Psychiatric:         Mood and Affect: Mood normal          Behavior: Behavior normal          Thought Content: Thought content normal          Judgment: Judgment normal            Vitals:    08/24/22 1009   BP: 122/76   BP Location: Left arm   Patient Position: Sitting   Cuff Size: Large   Pulse: 75   Resp: 16   Temp: 98 °F (36 7 °C)   TempSrc: Temporal   SpO2: 94%   Weight: 116 kg (256 lb)   Height: 5' 11" (1 803 m)       Transitional Care Management Review:  Karen Miranda is a 65 y o  male here for TCM follow up       During the TCM phone call patient stated:    TCM Call     Date and time call was made  8/19/2022 11:37 AM    Patient was hospitialized at  Other (comment)    [address]    Date of Admission  08/16/22    Date of discharge  08/18/22    Diagnosis pt said he has pain in face  Disposition  Home    Current Symptoms  None      TCM Call     Post hospital issues  None    Should patient be enrolled in anticoag monitoring? No    Scheduled for follow up?   Yes    Referrals needed  pt said he's being referral to an kidney    Did you obtain your prescribed medications  Yes    Do you need help managing your prescriptions or medications  No    Is transportation to your appointment needed  No    I have advised the patient to call PCP with any new or worsening symptoms  [place] Agosto    Living Arrangements  Spouse or Significiant other    Are you recieving any outpatient services  No    Are you recieving home care services  No    Are you using any community resources  No    Current waiver services  No    Have you fallen in the last 12 months  No    Interperter language line needed  No    Counseling  Patient          Daniel Porter, DO

## 2022-08-26 ENCOUNTER — APPOINTMENT (OUTPATIENT)
Dept: LAB | Facility: CLINIC | Age: 62
End: 2022-08-26
Payer: COMMERCIAL

## 2022-08-26 DIAGNOSIS — E11.8 TYPE 2 DIABETES MELLITUS WITH COMPLICATION (HCC): ICD-10-CM

## 2022-08-26 DIAGNOSIS — Z11.4 SCREENING FOR HIV (HUMAN IMMUNODEFICIENCY VIRUS): ICD-10-CM

## 2022-08-26 DIAGNOSIS — E87.5 HYPERKALEMIA: ICD-10-CM

## 2022-08-26 LAB
ALBUMIN SERPL BCP-MCNC: 3.7 G/DL (ref 3.5–5)
ALP SERPL-CCNC: 73 U/L (ref 46–116)
ALT SERPL W P-5'-P-CCNC: 50 U/L (ref 12–78)
ANION GAP SERPL CALCULATED.3IONS-SCNC: 4 MMOL/L (ref 4–13)
AST SERPL W P-5'-P-CCNC: 30 U/L (ref 5–45)
BILIRUB SERPL-MCNC: 0.35 MG/DL (ref 0.2–1)
BUN SERPL-MCNC: 19 MG/DL (ref 5–25)
CALCIUM SERPL-MCNC: 9.3 MG/DL (ref 8.3–10.1)
CHLORIDE SERPL-SCNC: 104 MMOL/L (ref 96–108)
CO2 SERPL-SCNC: 28 MMOL/L (ref 21–32)
CREAT SERPL-MCNC: 1.58 MG/DL (ref 0.6–1.3)
EST. AVERAGE GLUCOSE BLD GHB EST-MCNC: 197 MG/DL
GFR SERPL CREATININE-BSD FRML MDRD: 46 ML/MIN/1.73SQ M
GLUCOSE P FAST SERPL-MCNC: 178 MG/DL (ref 65–99)
HBA1C MFR BLD: 8.5 %
POTASSIUM SERPL-SCNC: 4.2 MMOL/L (ref 3.5–5.3)
PROT SERPL-MCNC: 8.3 G/DL (ref 6.4–8.4)
SODIUM SERPL-SCNC: 136 MMOL/L (ref 135–147)

## 2022-08-26 PROCEDURE — 87389 HIV-1 AG W/HIV-1&-2 AB AG IA: CPT

## 2022-08-26 PROCEDURE — 80053 COMPREHEN METABOLIC PANEL: CPT

## 2022-08-26 PROCEDURE — 83036 HEMOGLOBIN GLYCOSYLATED A1C: CPT

## 2022-08-26 PROCEDURE — 3052F HG A1C>EQUAL 8.0%<EQUAL 9.0%: CPT | Performed by: FAMILY MEDICINE

## 2022-08-26 PROCEDURE — 36415 COLL VENOUS BLD VENIPUNCTURE: CPT

## 2022-08-27 LAB — HIV 1+2 AB+HIV1 P24 AG SERPL QL IA: NORMAL

## 2022-09-16 ENCOUNTER — APPOINTMENT (OUTPATIENT)
Dept: LAB | Facility: CLINIC | Age: 62
End: 2022-09-16
Payer: COMMERCIAL

## 2022-09-16 DIAGNOSIS — N17.9 ACUTE KIDNEY INJURY (HCC): ICD-10-CM

## 2022-09-16 DIAGNOSIS — E87.5 HYPERKALEMIA: ICD-10-CM

## 2022-09-16 LAB
ALBUMIN SERPL BCP-MCNC: 3.8 G/DL (ref 3.5–5)
ALP SERPL-CCNC: 73 U/L (ref 46–116)
ALT SERPL W P-5'-P-CCNC: 48 U/L (ref 12–78)
ANION GAP SERPL CALCULATED.3IONS-SCNC: 7 MMOL/L (ref 4–13)
AST SERPL W P-5'-P-CCNC: 32 U/L (ref 5–45)
BILIRUB SERPL-MCNC: 0.32 MG/DL (ref 0.2–1)
BUN SERPL-MCNC: 22 MG/DL (ref 5–25)
CALCIUM SERPL-MCNC: 9.5 MG/DL (ref 8.3–10.1)
CHLORIDE SERPL-SCNC: 109 MMOL/L (ref 96–108)
CO2 SERPL-SCNC: 22 MMOL/L (ref 21–32)
CREAT SERPL-MCNC: 1.44 MG/DL (ref 0.6–1.3)
GFR SERPL CREATININE-BSD FRML MDRD: 52 ML/MIN/1.73SQ M
GLUCOSE P FAST SERPL-MCNC: 139 MG/DL (ref 65–99)
HCT VFR BLD AUTO: 39.8 % (ref 36.5–49.3)
HGB BLD-MCNC: 12.7 G/DL (ref 12–17)
PHOSPHATE SERPL-MCNC: 3.4 MG/DL (ref 2.3–4.1)
POTASSIUM SERPL-SCNC: 4.1 MMOL/L (ref 3.5–5.3)
PROT SERPL-MCNC: 8.2 G/DL (ref 6.4–8.4)
SODIUM SERPL-SCNC: 138 MMOL/L (ref 135–147)

## 2022-09-16 PROCEDURE — 3060F POS MICROALBUMINURIA REV: CPT | Performed by: FAMILY MEDICINE

## 2022-09-16 PROCEDURE — 85018 HEMOGLOBIN: CPT

## 2022-09-16 PROCEDURE — 80053 COMPREHEN METABOLIC PANEL: CPT

## 2022-09-16 PROCEDURE — 84100 ASSAY OF PHOSPHORUS: CPT

## 2022-09-16 PROCEDURE — 85014 HEMATOCRIT: CPT

## 2022-09-16 PROCEDURE — 36415 COLL VENOUS BLD VENIPUNCTURE: CPT

## 2022-09-16 PROCEDURE — 3066F NEPHROPATHY DOC TX: CPT | Performed by: FAMILY MEDICINE

## 2022-09-27 ENCOUNTER — TELEPHONE (OUTPATIENT)
Dept: FAMILY MEDICINE CLINIC | Facility: CLINIC | Age: 62
End: 2022-09-27

## 2022-09-27 DIAGNOSIS — S02.85XA CLOSED FRACTURE OF RIGHT ORBIT, INITIAL ENCOUNTER (HCC): Primary | ICD-10-CM

## 2022-09-27 RX ORDER — IBUPROFEN 800 MG/1
800 TABLET ORAL EVERY 6 HOURS PRN
Qty: 360 TABLET | Refills: 3 | Status: SHIPPED | OUTPATIENT
Start: 2022-09-27

## 2022-10-03 ENCOUNTER — APPOINTMENT (OUTPATIENT)
Dept: LAB | Facility: CLINIC | Age: 62
End: 2022-10-03
Payer: COMMERCIAL

## 2022-10-03 DIAGNOSIS — E29.1 HYPOGONADISM IN MALE: ICD-10-CM

## 2022-10-03 DIAGNOSIS — N18.31 CHRONIC KIDNEY DISEASE (CKD) STAGE G3A/A1, MODERATELY DECREASED GLOMERULAR FILTRATION RATE (GFR) BETWEEN 45-59 ML/MIN/1.73 SQUARE METER AND ALBUMINURIA CREATININE RATIO LESS THAN 30 MG/G (HCC): ICD-10-CM

## 2022-10-03 PROCEDURE — 36415 COLL VENOUS BLD VENIPUNCTURE: CPT

## 2022-10-03 PROCEDURE — 80048 BASIC METABOLIC PNL TOTAL CA: CPT

## 2022-10-04 LAB
ANION GAP SERPL CALCULATED.3IONS-SCNC: 5 MMOL/L (ref 4–13)
BUN SERPL-MCNC: 17 MG/DL (ref 5–25)
CALCIUM SERPL-MCNC: 9.3 MG/DL (ref 8.3–10.1)
CHLORIDE SERPL-SCNC: 109 MMOL/L (ref 96–108)
CO2 SERPL-SCNC: 23 MMOL/L (ref 21–32)
CREAT SERPL-MCNC: 1.34 MG/DL (ref 0.6–1.3)
GFR SERPL CREATININE-BSD FRML MDRD: 56 ML/MIN/1.73SQ M
GLUCOSE P FAST SERPL-MCNC: 72 MG/DL (ref 65–99)
POTASSIUM SERPL-SCNC: 4 MMOL/L (ref 3.5–5.3)
SODIUM SERPL-SCNC: 137 MMOL/L (ref 135–147)

## 2022-10-05 DIAGNOSIS — E11.8 TYPE 2 DIABETES MELLITUS WITH COMPLICATION (HCC): ICD-10-CM

## 2022-10-05 RX ORDER — GABAPENTIN 300 MG/1
600 CAPSULE ORAL 3 TIMES DAILY
Qty: 540 CAPSULE | Refills: 1 | Status: SHIPPED | OUTPATIENT
Start: 2022-10-05

## 2022-10-11 ENCOUNTER — CLINICAL SUPPORT (OUTPATIENT)
Dept: FAMILY MEDICINE CLINIC | Facility: CLINIC | Age: 62
End: 2022-10-11
Payer: COMMERCIAL

## 2022-10-11 DIAGNOSIS — Z23 NEEDS FLU SHOT: Primary | ICD-10-CM

## 2022-10-11 PROCEDURE — 90682 RIV4 VACC RECOMBINANT DNA IM: CPT

## 2022-10-11 PROCEDURE — G0008 ADMIN INFLUENZA VIRUS VAC: HCPCS

## 2022-10-20 ENCOUNTER — CLINICAL SUPPORT (OUTPATIENT)
Dept: FAMILY MEDICINE CLINIC | Facility: CLINIC | Age: 62
End: 2022-10-20

## 2022-10-20 DIAGNOSIS — E11.8 TYPE 2 DIABETES MELLITUS WITH COMPLICATION (HCC): Primary | ICD-10-CM

## 2022-10-20 NOTE — PROGRESS NOTES
[address] Pharmacist    Nanci Aguilar is a 65 y o  male who was referred to the pharmacist for DM education and management, referred by Sam Garcia MD      Telemedicine consent  The patient was identified by name and date of birth  Nanci Aguilar was informed that this is a telemedicine visit and that the visit is being conducted through Telephone  My office door was closed  No one else was in the room  He acknowledged consent and understanding of privacy and security of the video platform  The patient has agreed to participate and understands they can discontinue the visit at any time  Assessment/ Plan     1  Type 2 Diabetes:  • Goal A1c <7% based on ADA guidelines  Most recent A1c above goal at 8 5% (8/26/22) however has been trending down  · Reported Home SMBG  o Readings still above goal but are trending down  Morning average is the highest at 210 mg/dL  Discussed decreasing bedtime snack from 12 crackers to 6 crackers + protein source (I e  cheese or peanut butter) and also moving up timing of snack to be 2 hours before bedtime  · Complications:  o Microvascular complications: neuropathy, CKD   o Macrovascular complications: peripheral circulatory disorder  o Medication options  - Hx CKD  SGLT-2 inhibitor indicated for renal benefits    - Future consideration- He would also benefit from GLP-1 agonist as well for additional weight loss/ A1c lowering  He would need to obtain this through patient assistance program   · Medication Cost  · Approved for [employer] patient assistance program through [phone] calendar year    Changes to Medication Regimen: If PCP is in agreement  · Continue current medications  · Metformin was previously discontinued due to NEREYDA while admitted,  He has follow up with nephrologist scheduled    2  Need for Additional Therapies:  • Statin: atorvastatin 40 mg daily   Last LDL 47 which is at goal    • ACEI/ARB: on lisinopril 10 mg daily  Microalbuminuria present on last urine chem  • ASA: yes   • Neuropathy: Gabapentin 300 mg - 2 caps TID     3  Health Maintenance:  • Eye Exam: up to date  • Foot Exam: up to date  • Urine Microalbumin: up to date    4  Medication Reconciliation:   · Medication list reviewed with pt at today's visit  • Medication list updated to reflect medications pt is currently taking    Monitoring: testing 4x daily    Follow-up: 8 weeks    Subjective     1  Medication Adherence/ Tolerability:  • Novolin 70/30 mix vial- Injects BID (lunch and dinner)  o If : 40-50 units  o If BG < 200 mg/dL: 100 units  o If BG > 300 mg/dL: 120 units  o Self adjusts insulin dose based on blood sugar and what he is going to eat   • Dapagliflozin 10 mg daily    Review of Systems   Endocrine: Negative for polydipsia, polyphagia and polyuria  All other systems reviewed and are negative  Medications Tried in Past:  Janumet- cost constraints   Metformin- d/c due to NEREYDA      2  Lifestyle:   · Lost 24 pounds eating more salads  Most days having 2 meals and bedtime snack  · 12 PM Lunch  · 5 PM Dinner   · Snack at 12 PM- will have 10 -12 crackers   · Goes to bed at ~12 PM     3  Home monitoring devices  • Glucometer: Yes,   • CGM: No, Patient would benefit however is only on BID injections (insurance requires TID injections  • BP monitor: unknown     Objective     Lab Results   Component Value Date    HGBA1C 8 5 (H) 08/26/2022    HGBA1C 9 0 (H) 08/05/2022    HGBA1C 9 0 (H) 05/09/2022       Blood Glucose Readings  The patient is currently checking blood glucose 4 times per day  Patient reports with SMBG logs      Date AM Lunch Dinner Post-  Dinner   9/21 350 63 161 142   10/3 147 240 160 132   10/4 250 90 92 106   10/5 330 161 245 167   10/6 160 263 123 142   10/7 284 140 141 137   10/8 424 118 216 162   10/9 103 215 96 142   10/10 286 110 181 143   10/11 298 153 186 152   10/11 250 86 153 163   10/12 123 184 242 175 10/13 83 288 117 123   10/14 161 248 152 133   10/15 171 204 130 157   10/16 165 224 127 143   10/17 142 253 185 142   10/18 158 255 94 153   10/19 148 243 227 172   10/20 172 180     Avg 210 185 159 146     Hypoglycemia: The patient had 0 episodes/symptoms of hypoglycemia  ASCVD Risk:  The ASCVD Risk score (Corrine Larkin et al , 2013) failed to calculate for the following reasons: The valid total cholesterol range is 130 to 320 mg/dL     Vitals: There were no vitals filed for this visit      Labs:    Lab Results   Component Value Date    SODIUM 137 10/03/2022    K 4 0 10/03/2022    EGFR 56 10/03/2022    CREATININE 1 34 (H) 10/03/2022    GLUF 72 10/03/2022    MICROALBCRE 53 (H) 05/09/2022         Pharmacist Tracking Tool     Pharmacist Tracking Tool  Reason For Outreach: Embedded Pharmacist  Demographics:  Intervention Method: Phone  Type of Intervention: Follow-Up  Topics Addressed: Diabetes  Pharmacologic Interventions: N/A  Non-Pharmacologic Interventions: Disease state education, Home Monitoring and Medication/Device education  Time:  Direct Patient Care: 25 mins  Care Coordination: 15 mins  Recommendation Recipient: N/A  Outcome: N/A

## 2022-11-16 ENCOUNTER — APPOINTMENT (OUTPATIENT)
Dept: LAB | Facility: CLINIC | Age: 62
End: 2022-11-16

## 2022-11-16 DIAGNOSIS — N18.31 CHRONIC KIDNEY DISEASE, STAGE 3A (HCC): ICD-10-CM

## 2022-11-16 LAB
ANION GAP SERPL CALCULATED.3IONS-SCNC: 6 MMOL/L (ref 4–13)
BUN SERPL-MCNC: 25 MG/DL (ref 5–25)
CALCIUM SERPL-MCNC: 10.1 MG/DL (ref 8.3–10.1)
CHLORIDE SERPL-SCNC: 107 MMOL/L (ref 96–108)
CO2 SERPL-SCNC: 25 MMOL/L (ref 21–32)
CREAT SERPL-MCNC: 1.48 MG/DL (ref 0.6–1.3)
GFR SERPL CREATININE-BSD FRML MDRD: 50 ML/MIN/1.73SQ M
GLUCOSE P FAST SERPL-MCNC: 130 MG/DL (ref 65–99)
POTASSIUM SERPL-SCNC: 4.1 MMOL/L (ref 3.5–5.3)
SODIUM SERPL-SCNC: 138 MMOL/L (ref 135–147)

## 2022-11-17 LAB
TESTOST FREE SERPL-MCNC: 31 PG/ML (ref 6.6–18.1)
TESTOST SERPL-MCNC: 271 NG/DL (ref 264–916)

## 2022-11-30 ENCOUNTER — RA CDI HCC (OUTPATIENT)
Dept: OTHER | Facility: HOSPITAL | Age: 62
End: 2022-11-30

## 2022-11-30 NOTE — PROGRESS NOTES
E11 65, E11 22  [facility]  coding opportunities          Chart Reviewed number of suggestions sent to Provider: 2     Patients Insurance     Medicare Insurance: [employer]

## 2022-12-05 ENCOUNTER — APPOINTMENT (OUTPATIENT)
Dept: LAB | Facility: CLINIC | Age: 62
End: 2022-12-05

## 2022-12-05 DIAGNOSIS — E87.5 HYPERKALEMIA: ICD-10-CM

## 2022-12-06 LAB
ANION GAP SERPL CALCULATED.3IONS-SCNC: 4 MMOL/L (ref 4–13)
BUN SERPL-MCNC: 26 MG/DL (ref 5–25)
CALCIUM SERPL-MCNC: 9.4 MG/DL (ref 8.3–10.1)
CHLORIDE SERPL-SCNC: 106 MMOL/L (ref 96–108)
CO2 SERPL-SCNC: 25 MMOL/L (ref 21–32)
CREAT SERPL-MCNC: 1.65 MG/DL (ref 0.6–1.3)
GFR SERPL CREATININE-BSD FRML MDRD: 44 ML/MIN/1.73SQ M
GLUCOSE P FAST SERPL-MCNC: 179 MG/DL (ref 65–99)
POTASSIUM SERPL-SCNC: 4.6 MMOL/L (ref 3.5–5.3)
SODIUM SERPL-SCNC: 135 MMOL/L (ref 135–147)

## 2022-12-08 ENCOUNTER — CLINICAL SUPPORT (OUTPATIENT)
Dept: FAMILY MEDICINE CLINIC | Facility: CLINIC | Age: 62
End: 2022-12-08

## 2022-12-08 DIAGNOSIS — E11.8 TYPE 2 DIABETES MELLITUS WITH COMPLICATION (HCC): Primary | ICD-10-CM

## 2022-12-08 NOTE — PROGRESS NOTES
[address] Fern[address]    Ihsan Terrell is a 65 y o  male who was referred to the pharmacist for DM education and management, referred by Piper Blue MD      Telemedicine consent  The patient was identified by name and date of birth  Ihsan Terrell was informed that this is a telemedicine visit and that the visit is being conducted through Telephone  My office door was closed  No one else was in the room  He acknowledged consent and understanding of privacy and security of the video platform  The patient has agreed to participate and understands they can discontinue the visit at any time  Assessment/ Plan     1  Type 2 Diabetes:  • Goal A1c <7% based on ADA guidelines  Most recent A1c above goal at 8 5% (8/26/22) however has been trending down  · Reported Home SMBG  o Variability in readings  Range from 's  Patient unable to identify any changes in diet or lifestyle that is leading to variability  · Complications:  o Microvascular complications: neuropathy, CKD   o Macrovascular complications: peripheral circulatory disorder  o Medication options  - Hx CKD  On Chintan Idol which is indicated for renal benefits    - Future consideration-   • He would also benefit from GLP-1 agonist as well for additional weight loss/ A1c lowering  He would need to obtain this through patient assistance program    • If next GFR increases above 45 can also re-initiate metformin  · Medication Cost  · Approved for [employer] patient assistance program through 2023 calendar year    Changes to Medication Regimen: If PCP is in agreement  · Continue current medications  · Patient instructed to track insulin doses and food when readings are <70 mg/dL or >300 mg/dL  Encouraged more consistent meals, portion sizes, and snacks  2  Need for Additional Therapies:  • Statin: atorvastatin 40 mg daily   Last LDL 47 which is at goal    • ACEI/ARB: on lisinopril 10 mg daily  Microalbuminuria present on last urine chem  • ASA: yes   • Neuropathy: Gabapentin 300 mg - 2 caps TID     3  Health Maintenance:  • Eye Exam: up to date  • Foot Exam: up to date  • Urine Microalbumin: up to date    4  Medication Reconciliation:   · Medication list reviewed with pt at today's visit  • Medication list updated to reflect medications pt is currently taking    Monitoring: testing 4x daily    Follow-up: 8 weeks    Subjective     1  Medication Adherence/ Tolerability:  • Novolin 70/30 mix vial- Injects BID (lunch and dinner)  o If : 40-50 units  o If BG > 200 mg/dL: 100 units  o If BG > 300 mg/dL: 120 units  o Self adjusts insulin dose based on blood sugar and what he is going to eat   • Dapagliflozin 10 mg daily    Review of Systems   Endocrine: Negative for polydipsia, polyphagia and polyuria  All other systems reviewed and are negative  Medications Tried in Past:  Janumet- cost constraints   Metformin- d/c due to NEREYDA      2  Lifestyle:   · Lost 24 pounds eating more salads  Most days having 2 meals and bedtime snack  · 12 PM Lunch  · 5 PM Dinner   · Snack at 12 PM- will have 10 -12 crackers   · Goes to bed at ~12 PM     3  Home monitoring devices  • Glucometer: Yes,   • CGM: No, Patient would benefit however is only on BID injections (insurance requires TID injections  • BP monitor: unknown     Objective     Lab Results   Component Value Date    HGBA1C 8 5 (H) 08/26/2022    HGBA1C 9 0 (H) 08/05/2022    HGBA1C 9 0 (H) 05/09/2022       Blood Glucose Readings  The patient is currently checking blood glucose 4 times per day  Patient reports with SMBG logs      Date AM Lunch Dinner Post-  Dinner   10/24 172 180 192 163   10/25 112 166 256 153   10/26 154 194 257 172   10/27 98 158 168 142   10/28 294 125 157 148   10/29 154 186 182 165   10/30 54 207 160 153   10/31 126 313 87 141   11/1 198 244 182 153   11/2 196 179 167 142   11/3 154 181 175 147   11/4 116 237 144 156   11/5 305 152 249 172   11/5 90 223 88 122   11/6 246 101 158 131   11/7 314 163 67 121   11/8 221 62 167 151   11/9 206 201 82 141   11/10 262 103 187 153   11/11 248 144 136 151   11/12 287 336 241 172   11/13 60 219 163 142   11/14 411 83 136 147   11/15 308 77 313 172   11/16 266 122 191 167   11/17 250 77 160 142   11/18 259 173 175 141   11/19 202 215 226 178   11/18 326 146 203 164   11/19 69 194 108 107   11/20 125 172 158 133   11/21 142 246 93 121   11/22 184 230 208 156   11/23 63 115 127 135   11/24 107 218 87 127   11/25 204 105 122 115   11/26 108 185 206 163   11/27 202 90     11/28 203 95 91 123   11/29 278 77 89 131   11/30 316 101 125 115   12/1 183 249 112 121   12/2 282 110 179 143   12/3 105 245 143 121   12/4 143 124 174 151   12/5 326 120 223 147   12/6 260 157 62 125   12/7 294 104 135 147   12/8 342 219 137 153   Avg 204 165 159 144     Hypoglycemia: The patient had 5 episodes/symptoms of hypoglycemia  ASCVD Risk:  The ASCVD Risk score (Perla OATES, et al , 2019) failed to calculate for the following reasons: The valid total cholesterol range is 130 to 320 mg/dL     Vitals: There were no vitals filed for this visit      Labs:    Lab Results   Component Value Date    SODIUM 135 12/05/2022    K 4 6 12/05/2022    EGFR 44 12/05/2022    CREATININE 1 65 (H) 12/05/2022    GLUF 179 (H) 12/05/2022    MICROALBCRE 53 (H) 05/09/2022         Pharmacist Tracking Tool     Pharmacist Tracking Tool  Reason For Outreach: Embedded Pharmacist  Demographics:  Intervention Method: Phone  Type of Intervention: Follow-Up  Topics Addressed: Diabetes  Pharmacologic Interventions: N/A  Non-Pharmacologic Interventions: Disease state education, Home Monitoring and Medication/Device education  Time:  Direct Patient Care: 25 mins  Care Coordination: 15 mins  Recommendation Recipient: N/A  Outcome: N/A

## 2022-12-19 ENCOUNTER — OFFICE VISIT (OUTPATIENT)
Dept: FAMILY MEDICINE CLINIC | Facility: CLINIC | Age: 62
End: 2022-12-19

## 2022-12-19 VITALS
OXYGEN SATURATION: 95 % | TEMPERATURE: 98 F | WEIGHT: 260 LBS | HEIGHT: 71 IN | DIASTOLIC BLOOD PRESSURE: 62 MMHG | BODY MASS INDEX: 36.4 KG/M2 | RESPIRATION RATE: 18 BRPM | SYSTOLIC BLOOD PRESSURE: 133 MMHG | HEART RATE: 78 BPM

## 2022-12-19 DIAGNOSIS — E66.01 OBESITY, MORBID (HCC): ICD-10-CM

## 2022-12-19 DIAGNOSIS — N18.31 STAGE 3A CHRONIC KIDNEY DISEASE (HCC): ICD-10-CM

## 2022-12-19 DIAGNOSIS — E78.2 MIXED HYPERLIPIDEMIA: ICD-10-CM

## 2022-12-19 DIAGNOSIS — E11.8 TYPE 2 DIABETES MELLITUS WITH COMPLICATION (HCC): ICD-10-CM

## 2022-12-19 DIAGNOSIS — I10 PRIMARY HYPERTENSION: ICD-10-CM

## 2022-12-19 DIAGNOSIS — E11.42 DIABETIC POLYNEUROPATHY ASSOCIATED WITH TYPE 2 DIABETES MELLITUS (HCC): Primary | ICD-10-CM

## 2022-12-19 PROBLEM — E11.69 DIABETIC FOOT ULCER WITH OSTEOMYELITIS (HCC): Status: RESOLVED | Noted: 2018-09-28 | Resolved: 2022-12-19

## 2022-12-19 PROBLEM — L97.509 DIABETIC FOOT ULCER WITH OSTEOMYELITIS (HCC): Status: RESOLVED | Noted: 2018-09-28 | Resolved: 2022-12-19

## 2022-12-19 PROBLEM — M86.9 DIABETIC FOOT ULCER WITH OSTEOMYELITIS (HCC): Status: RESOLVED | Noted: 2018-09-28 | Resolved: 2022-12-19

## 2022-12-19 PROBLEM — S02.85XA: Status: RESOLVED | Noted: 2022-08-24 | Resolved: 2022-12-19

## 2022-12-19 PROBLEM — E11.621 DIABETIC FOOT ULCER WITH OSTEOMYELITIS (HCC): Status: RESOLVED | Noted: 2018-09-28 | Resolved: 2022-12-19

## 2022-12-19 NOTE — PROGRESS NOTES
Name: Monique Gann      : 1960      MRN: [id number]  Encounter Provider: Judyth Meckel, MD  Encounter Date: 2022   Encounter department: [address] [place] [address]  Diabetic polyneuropathy associated with type 2 diabetes mellitus [facility])  Assessment & Plan:    Lab Results   Component Value Date    HGBA1C 8 5 (H) 2022     A1c improving  Continue current  2  Type 2 diabetes mellitus with complication [facility])  Assessment & Plan:    Lab Results   Component Value Date    HGBA1C 8 5 (H) 2022     A1c improving  Continue current  3  Primary hypertension  Assessment & Plan:  BP at goal   Continue current  4  Stage 3a chronic kidney disease [facility])  Assessment & Plan:  Lab Results   Component Value Date    EGFR 44 2022    EGFR 50 2022    EGFR 56 10/03/2022    CREATININE 1 65 (H) 2022    CREATININE 1 48 (H) 2022    CREATININE 1 34 (H) 10/03/2022     A1c improving  Continue current  5  Mixed hyperlipidemia    6  Obesity, morbid ([address] )  Assessment & Plan:  A1c improving  Continue current  Subjective      His BS is under better control on his current regimen  He has no side effects and no hypoglycemia  He has no urinary side effects from his SGLT-2  His BP is at goal on his current regimen  He has no HA or vision changes  He has no PND or orthopnea  He has no CP or SOB  His lipids are at goal on his current regimen  He has normal liver function testing and no myalgia or muscle weakness  He declines colon cancer screening  Review of Systems   All other systems reviewed and are negative        Current Outpatient Medications on File Prior to Visit   Medication Sig   • atorvastatin (LIPITOR) 40 mg tablet take 1 tablet by mouth once daily   • Blood Glucose Monitoring Suppl (OneTouch Verio Flex System) w/Device KIT USE AS DIRECTED BY MD TESTING FOUR TIMES DAILY   • cyanocobalamin 1,000 mcg/mL Inject 1 mL (1,000 mcg total) into a muscle every 30 (thirty) days   • Dapagliflozin Propanediol 10 MG TABS Take 1 tablet (10 mg total) by mouth daily   • diphenhydrAMINE-acetaminophen (TYLENOL PM)  MG TABS Take 2 tablets by mouth daily at bedtime as needed for sleep   • gabapentin (NEURONTIN) 300 mg capsule Take 2 capsules (600 mg total) by mouth 3 (three) times a day   • glucose blood (OneTouch Verio) test strip Check blood sugars four times daily  Please substitute with appropriate alternative as covered by patient's insurance  Dx: E11 65   • ibuprofen (MOTRIN) 800 mg tablet Take 1 tablet (800 mg total) by mouth every 6 (six) hours as needed for mild pain   • NovoLIN 70/30 ReliOn (70-30) 100 UNIT/ML subcutaneous injection Inject 75- 100 units twice daily   • OneTouch Delica Lancets 30M MISC Check blood sugars four times daily  Please substitute with appropriate alternative as covered by patient's insurance  Dx: E11 65   • Syringe/Needle, Disp, (SYRINGE 3CC/20GX1") 20G X 1" 3 ML MISC Use every 30 (thirty) days       Objective     /62 (BP Location: Left arm, Patient Position: Sitting, Cuff Size: Large)   Pulse 78   Temp 98 °F (36 7 °C) (Temporal)   Resp 18   Ht 5' 11" (1 803 m)   Wt 118 kg (260 lb)   SpO2 95%   BMI 36 26 kg/m²     Physical Exam  Vitals and nursing note reviewed  Constitutional:       Appearance: Normal appearance  He is obese  Cardiovascular:      Rate and Rhythm: Normal rate and regular rhythm  Pulses: Normal pulses  Heart sounds: Normal heart sounds  Pulmonary:      Effort: Pulmonary effort is normal       Breath sounds: Normal breath sounds  Abdominal:      General: Abdomen is flat  Bowel sounds are normal       Palpations: Abdomen is soft  Musculoskeletal:         General: Normal range of motion  Cervical back: Normal range of motion and neck supple  Skin:     General: Skin is warm and dry  Capillary Refill: Capillary refill takes less than 2 seconds  Neurological:      General: No focal deficit present  Mental Status: He is alert and oriented to person, place, and time  Mental status is at baseline  Psychiatric:         Mood and Affect: Mood normal          Behavior: Behavior normal          Thought Content:  Thought content normal          Judgment: Judgment normal        Judyth Meckel, MD

## 2022-12-19 NOTE — ASSESSMENT & PLAN NOTE
Lab Results   Component Value Date    HGBA1C 8 5 (H) 08/26/2022     A1c improving  Continue current

## 2022-12-19 NOTE — ASSESSMENT & PLAN NOTE
Lab Results   Component Value Date    EGFR 44 12/05/2022    EGFR 50 11/16/2022    EGFR 56 10/03/2022    CREATININE 1 65 (H) 12/05/2022    CREATININE 1 48 (H) 11/16/2022    CREATININE 1 34 (H) 10/03/2022     A1c improving  Continue current

## 2023-01-31 DIAGNOSIS — E11.8 TYPE 2 DIABETES MELLITUS WITH COMPLICATION (HCC): ICD-10-CM

## 2023-01-31 RX ORDER — BLOOD SUGAR DIAGNOSTIC
STRIP MISCELLANEOUS
Qty: 400 EACH | Refills: 0 | Status: SHIPPED | OUTPATIENT
Start: 2023-01-31

## 2023-02-02 DIAGNOSIS — E53.8 B12 DEFICIENCY: ICD-10-CM

## 2023-02-02 RX ORDER — CYANOCOBALAMIN 1000 UG/ML
INJECTION INTRAMUSCULAR; SUBCUTANEOUS
Qty: 10 ML | Refills: 5 | Status: SHIPPED | OUTPATIENT
Start: 2023-02-02

## 2023-02-12 DIAGNOSIS — E11.8 TYPE 2 DIABETES MELLITUS WITH COMPLICATION (HCC): ICD-10-CM

## 2023-02-13 RX ORDER — BLOOD SUGAR DIAGNOSTIC
STRIP MISCELLANEOUS
Qty: 400 EACH | Refills: 0 | Status: SHIPPED | OUTPATIENT
Start: 2023-02-13

## 2023-02-22 ENCOUNTER — CLINICAL SUPPORT (OUTPATIENT)
Dept: FAMILY MEDICINE CLINIC | Facility: CLINIC | Age: 63
End: 2023-02-22

## 2023-02-22 DIAGNOSIS — E11.8 TYPE 2 DIABETES MELLITUS WITH COMPLICATION (HCC): Primary | ICD-10-CM

## 2023-02-22 NOTE — PROGRESS NOTES
[address] Pharmacist    Jun Valenzuela is a 65 y o  male who was referred to the pharmacist for DM education and management, referred by Joni Mcpherson MD      Telemedicine consent  The patient was identified by name and date of birth  Jun Valenzuela was informed that this is a telemedicine visit and that the visit is being conducted through Telephone  My office door was closed  No one else was in the room  He acknowledged consent and understanding of privacy and security of the video platform  The patient has agreed to participate and understands they can discontinue the visit at any time  Assessment/ Plan     1  Type 2 Diabetes:  • Goal A1c <7% based on ADA guidelines  Most recent A1c above goal at 8 5% (8/26/22) however has been trending down  Due for repeat A1c  · Reported Home SMBG  o Morning average is elevated  Likely due to snacking on ~20 crackers at bedtime  Discussed cutting back on carbs with snack  For example ~8 crackers and adding protein to snack such as peanut butter of cheese  o He was having low blood sugar event before dinner, but he has cut back on his insulin with lunch which has decreased hypoglycemia events  · Complications:  o Microvascular complications: neuropathy, CKD   o Macrovascular complications: peripheral circulatory disorder  o Medication options  - Hx CKD  On Taye Mealy which is indicated for renal benefits    - Future consideration-   • He would also benefit from GLP-1 agonist as well for additional weight loss/ A1c lowering  He would need to obtain this through patient assistance program and unfortunately [employer] program is no longer accepting new applicants at this time due to drug shortages  • If next GFR increases above 45 can also re-initiate metformin  · Medication Cost  · Approved for [employer] patient assistance program through 2023 calendar year    Changes to Medication Regimen:    If PCP is in agreement  · Continue current medications  · Decrease carbs with bedtime snack  Continue lower insulin dose with lunch to minimize hypoglycemia events  2  Need for Additional Therapies:  • Statin: atorvastatin 40 mg daily  Last LDL 47 which is at goal    • ACEI/ARB: on lisinopril 10 mg daily  Microalbuminuria present on last urine chem  • ASA: yes   • Neuropathy: Gabapentin 300 mg - 2 caps TID     3  Health Maintenance:  • Eye Exam: up to date  • Foot Exam: Due as of 2/25/23  • Urine Microalbumin: up to date    4  Medication Reconciliation:   · Medication list reviewed with pt at today's visit  • Medication list updated to reflect medications pt is currently taking    Monitoring: testing 4x daily    Follow-up: 12 weeks    Subjective     1  Medication Adherence/ Tolerability:  • Novolin 70/30 mix vial- Injects BID (lunch and dinner)  o < 150 : no insulin  o If : 40-50 units  o If BG > 200 mg/dL: 100 units  o If BG > 300 mg/dL: 120 units  o Self adjusts insulin dose based on blood sugar and what he is going to eat   • Dapagliflozin 10 mg daily    Review of Systems   Endocrine: Negative for polydipsia, polyphagia and polyuria  All other systems reviewed and are negative  Medications Tried in Past:  Janumet- cost constraints   Metformin- d/c due to NEREYDA      2  Lifestyle:   · Eating more salads  Most days having 2 meals and bedtime snack  · Morning: drinking coffee with splenda and milk  · 12 PM Lunch- usually a salad but sometimes will have sandwich and iced tea or lemonade (sugar free)   · 5 PM Dinner - Usually pork chops with potatoes    · Snack at 12 PM- will have small snack before bed 15-20 crackers   · Goes to bed at ~12 PM     3   Home monitoring devices  • Glucometer: Yes,   • CGM: No, Patient would benefit however is only on BID injections (insurance requires TID injections  • BP monitor: unknown     Objective     Lab Results   Component Value Date    HGBA1C 8 5 (H) 08/26/2022    HGBA1C 9 0 (H) 08/05/2022    HGBA1C 9 0 (H) 05/09/2022       Blood Glucose Readings  The patient is currently checking blood glucose 4 times per day  Patient reports with SMBG logs  Date AM (5:30 AM)  Lunch  (12 PM) Dinner  (5:30 PM) Post-  Dinner   2/1 [phone]   2/3 303 162 81 127   2/4 337 129 107 113   2/5 329 205 62 117   2/6 313 275 58 126   2/7 277 220 58 128   2/8 360 69 218 147   2/9 228 164 92 117   2/10 200 67 229 143   2/11 219 174 71 107   2/12 331 77 256 151   2/13 224 201 92 113   2/14 221 143 181 115   2/15 229 81 176 127   2/16 258 72 160 113   2/17 314 91 247 163   2/18 140 97 330 148   2/19 289 181 167 127   2/20 223 221 84 113   2/21 258 85 130 117   Avg 274 147 138 127     Hypoglycemia: The patient had several episodes/symptoms of hypoglycemia in early Feb  He has decreased his insulin since then and hypoglycemia episodes have decreased  ASCVD Risk:  The ASCVD Risk score (Perla OATES, et al , 2019) failed to calculate for the following reasons: The valid total cholesterol range is 130 to 320 mg/dL     Vitals: There were no vitals filed for this visit      Labs:    Lab Results   Component Value Date    SODIUM 135 12/05/2022    K 4 6 12/05/2022    EGFR 44 12/05/2022    CREATININE 1 65 (H) 12/05/2022    GLUF 179 (H) 12/05/2022    MICROALBCRE 53 (H) 05/09/2022         Pharmacist Tracking Tool     Pharmacist Tracking Tool  Reason For Outreach: Embedded Pharmacist  Demographics:  Intervention Method: Phone  Type of Intervention: Follow-Up  Topics Addressed: Diabetes  Pharmacologic Interventions: N/A  Non-Pharmacologic Interventions: Disease state education, Home Monitoring and Medication/Device education  Time:  Direct Patient Care: 25 mins  Care Coordination: 15 mins  Recommendation Recipient: N/A  Outcome: N/A

## 2023-03-06 ENCOUNTER — APPOINTMENT (OUTPATIENT)
Dept: LAB | Facility: CLINIC | Age: 63
End: 2023-03-06

## 2023-03-06 DIAGNOSIS — E11.8 TYPE 2 DIABETES MELLITUS WITH COMPLICATION (HCC): ICD-10-CM

## 2023-03-06 LAB
EST. AVERAGE GLUCOSE BLD GHB EST-MCNC: 226 MG/DL
HBA1C MFR BLD: 9.5 %

## 2023-03-07 LAB
ALBUMIN SERPL BCP-MCNC: 4.1 G/DL (ref 3.5–5)
ALP SERPL-CCNC: 82 U/L (ref 46–116)
ALT SERPL W P-5'-P-CCNC: 32 U/L (ref 12–78)
ANION GAP SERPL CALCULATED.3IONS-SCNC: 4 MMOL/L (ref 4–13)
AST SERPL W P-5'-P-CCNC: 23 U/L (ref 5–45)
BILIRUB SERPL-MCNC: 0.27 MG/DL (ref 0.2–1)
BUN SERPL-MCNC: 28 MG/DL (ref 5–25)
CALCIUM SERPL-MCNC: 9.4 MG/DL (ref 8.3–10.1)
CHLORIDE SERPL-SCNC: 108 MMOL/L (ref 96–108)
CHOLEST SERPL-MCNC: 117 MG/DL
CO2 SERPL-SCNC: 22 MMOL/L (ref 21–32)
CREAT SERPL-MCNC: 1.96 MG/DL (ref 0.6–1.3)
GFR SERPL CREATININE-BSD FRML MDRD: 35 ML/MIN/1.73SQ M
GLUCOSE P FAST SERPL-MCNC: 176 MG/DL (ref 65–99)
HDLC SERPL-MCNC: 56 MG/DL
LDLC SERPL CALC-MCNC: 40 MG/DL (ref 0–100)
NONHDLC SERPL-MCNC: 61 MG/DL
POTASSIUM SERPL-SCNC: 5.2 MMOL/L (ref 3.5–5.3)
PROT SERPL-MCNC: 7.9 G/DL (ref 6.4–8.4)
SODIUM SERPL-SCNC: 134 MMOL/L (ref 135–147)
TRIGL SERPL-MCNC: 106 MG/DL

## 2023-03-09 ENCOUNTER — RA CDI HCC (OUTPATIENT)
Dept: OTHER | Facility: HOSPITAL | Age: 63
End: 2023-03-09

## 2023-03-09 NOTE — PROGRESS NOTES
E11 22, E11 65  [facility]  coding opportunities          Chart Reviewed number of suggestions sent to Provider: 2     Patients Insurance     Medicare Insurance: [employer]

## 2023-03-17 ENCOUNTER — OFFICE VISIT (OUTPATIENT)
Dept: FAMILY MEDICINE CLINIC | Facility: CLINIC | Age: 63
End: 2023-03-17

## 2023-03-17 VITALS
RESPIRATION RATE: 18 BRPM | TEMPERATURE: 98.6 F | HEART RATE: 83 BPM | SYSTOLIC BLOOD PRESSURE: 135 MMHG | OXYGEN SATURATION: 96 % | BODY MASS INDEX: 36.04 KG/M2 | WEIGHT: 258.4 LBS | DIASTOLIC BLOOD PRESSURE: 66 MMHG

## 2023-03-17 DIAGNOSIS — N18.31 STAGE 3A CHRONIC KIDNEY DISEASE (HCC): ICD-10-CM

## 2023-03-17 DIAGNOSIS — E29.1 HYPOGONADISM IN MALE: ICD-10-CM

## 2023-03-17 DIAGNOSIS — I10 PRIMARY HYPERTENSION: ICD-10-CM

## 2023-03-17 DIAGNOSIS — E11.42 DIABETIC POLYNEUROPATHY ASSOCIATED WITH TYPE 2 DIABETES MELLITUS (HCC): Primary | ICD-10-CM

## 2023-03-17 DIAGNOSIS — E78.2 MIXED HYPERLIPIDEMIA: ICD-10-CM

## 2023-03-17 DIAGNOSIS — E11.51 TYPE II DIABETES MELLITUS WITH PERIPHERAL CIRCULATORY DISORDER (HCC): ICD-10-CM

## 2023-03-17 DIAGNOSIS — E66.01 OBESITY, MORBID (HCC): ICD-10-CM

## 2023-03-17 DIAGNOSIS — E11.8 TYPE 2 DIABETES MELLITUS WITH COMPLICATION (HCC): ICD-10-CM

## 2023-03-17 RX ORDER — LISINOPRIL 10 MG/1
10 TABLET ORAL DAILY
COMMUNITY
End: 2023-03-17 | Stop reason: SDUPTHER

## 2023-03-17 RX ORDER — LISINOPRIL 10 MG/1
10 TABLET ORAL DAILY
Qty: 90 TABLET | Refills: 3 | Status: SHIPPED | OUTPATIENT
Start: 2023-03-17

## 2023-03-17 NOTE — PROGRESS NOTES
Name: Jennyfer Pompa      : 1960      MRN: [id number]  Encounter Provider: Frida Ramirez MD  Encounter Date: 3/17/2023   Encounter department: [address] [place] [address]     1  Diabetic polyneuropathy associated with type 2 diabetes mellitus ([facility] )    2  Type 2 diabetes mellitus with complication (HCC)  -     Urine Microalbumin/creatinine ratio; Future  -     lisinopril (ZESTRIL) 10 mg tablet; Take 1 tablet (10 mg total) by mouth daily  -     Vitamin B12; Future  -     TSH, 3rd generation; Future  -     HEMOGLOBIN A1C W/ EAG ESTIMATION; Future    3  Hypogonadism in male    4  Primary hypertension  -     lisinopril (ZESTRIL) 10 mg tablet; Take 1 tablet (10 mg total) by mouth daily    5  Stage 3a chronic kidney disease (HCC)  -     lisinopril (ZESTRIL) 10 mg tablet; Take 1 tablet (10 mg total) by mouth daily    6  Mixed hyperlipidemia    7  Obesity, morbid ([place] [address] )    8  Type II diabetes mellitus with peripheral circulatory disorder (HCC)           Subjective      His A1c is elevated  He has no side effects and no hypoglycemia  His kidney function has deteriorated  He is seeing nephrology  His lipids are at goal on his current regimen  Review of Systems   All other systems reviewed and are negative        Current Outpatient Medications on File Prior to Visit   Medication Sig   • atorvastatin (LIPITOR) 40 mg tablet take 1 tablet by mouth once daily   • B-D SYRINGE/NEEDLE 3CC/22GX1 5 22G X 1-1/2" 3 ML MISC use as directed   • Blood Glucose Monitoring Suppl (OneTouch Verio Flex System) w/Device KIT USE AS DIRECTED BY MD TESTING FOUR TIMES DAILY   • Dapagliflozin Propanediol 10 MG TABS Take 1 tablet (10 mg total) by mouth daily   • diphenhydrAMINE-acetaminophen (TYLENOL PM)  MG TABS Take 2 tablets by mouth daily at bedtime as needed for sleep   • Dodex 1000 MCG/ML inject 1 milliliter ( 1000 MCG ) intramuscularly Every Month   • gabapentin (NEURONTIN) 300 mg capsule Take 2 capsules (600 mg total) by mouth 3 (three) times a day   • ibuprofen (MOTRIN) 800 mg tablet Take 1 tablet (800 mg total) by mouth every 6 (six) hours as needed for mild pain   • NovoLIN 70/30 ReliOn (70-30) 100 UNIT/ML subcutaneous injection Inject 75- 100 units twice daily   • OneTouch Delica Lancets 25N MISC Check blood sugars four times daily  Please substitute with appropriate alternative as covered by patient's insurance  Dx: E11 65   • OneTouch Verio test strip CHECK BLOOD SUGARS FOUR TIMES DAILY  PLEASE SUBSTITUTE WITH APPROPRIATE ALTERNATIVE AS COVERED BY PATIENT'S INSURANCE   • [DISCONTINUED] lisinopril (ZESTRIL) 10 mg tablet Take 10 mg by mouth daily       Objective     /66 (BP Location: Left arm, Patient Position: Sitting, Cuff Size: Large)   Pulse 83   Temp 98 6 °F (37 °C) (Tympanic)   Resp 18   Wt 117 kg (258 lb 6 4 oz)   SpO2 96%   BMI 36 04 kg/m²     Physical Exam  Vitals and nursing note reviewed  Constitutional:       Appearance: Normal appearance  He is obese  Cardiovascular:      Rate and Rhythm: Normal rate and regular rhythm  Pulses: Normal pulses  Heart sounds: Normal heart sounds  Pulmonary:      Effort: Pulmonary effort is normal       Breath sounds: Normal breath sounds  Abdominal:      General: Abdomen is flat  Bowel sounds are normal       Palpations: Abdomen is soft  Musculoskeletal:         General: Normal range of motion  Cervical back: Normal range of motion and neck supple  Skin:     General: Skin is warm and dry  Capillary Refill: Capillary refill takes less than 2 seconds  Neurological:      General: No focal deficit present  Mental Status: He is alert and oriented to person, place, and time  Mental status is at baseline  Psychiatric:         Mood and Affect: Mood normal          Behavior: Behavior normal          Thought Content:  Thought content normal          Judgment: Judgment normal        Amaya Sosa MD

## 2023-03-21 ENCOUNTER — APPOINTMENT (OUTPATIENT)
Dept: LAB | Facility: CLINIC | Age: 63
End: 2023-03-21

## 2023-03-21 DIAGNOSIS — N18.31 STAGE 3A CHRONIC KIDNEY DISEASE (HCC): ICD-10-CM

## 2023-03-21 DIAGNOSIS — E11.8 TYPE 2 DIABETES MELLITUS WITH COMPLICATION (HCC): ICD-10-CM

## 2023-03-21 DIAGNOSIS — R80.9 PROTEINURIA, UNSPECIFIED TYPE: ICD-10-CM

## 2023-03-21 DIAGNOSIS — D64.9 ANEMIA, UNSPECIFIED TYPE: ICD-10-CM

## 2023-03-21 DIAGNOSIS — E55.9 VITAMIN D DEFICIENCY, UNSPECIFIED: ICD-10-CM

## 2023-03-21 LAB
ANION GAP SERPL CALCULATED.3IONS-SCNC: 5 MMOL/L (ref 4–13)
BUN SERPL-MCNC: 28 MG/DL (ref 5–25)
CALCIUM SERPL-MCNC: 9.6 MG/DL (ref 8.3–10.1)
CHLORIDE SERPL-SCNC: 104 MMOL/L (ref 96–108)
CO2 SERPL-SCNC: 27 MMOL/L (ref 21–32)
CREAT SERPL-MCNC: 1.59 MG/DL (ref 0.6–1.3)
FERRITIN SERPL-MCNC: 11 NG/ML (ref 8–388)
GFR SERPL CREATININE-BSD FRML MDRD: 46 ML/MIN/1.73SQ M
GLUCOSE P FAST SERPL-MCNC: 160 MG/DL (ref 65–99)
HCT VFR BLD AUTO: 42.1 % (ref 36.5–49.3)
HGB BLD-MCNC: 13.7 G/DL (ref 12–17)
IRON SATN MFR SERPL: 19 % (ref 20–50)
IRON SERPL-MCNC: 63 UG/DL (ref 65–175)
POTASSIUM SERPL-SCNC: 5 MMOL/L (ref 3.5–5.3)
PTH-INTACT SERPL-MCNC: 29.7 PG/ML (ref 18.4–80.1)
SODIUM SERPL-SCNC: 136 MMOL/L (ref 135–147)
TIBC SERPL-MCNC: 328 UG/DL (ref 250–450)
TSH SERPL DL<=0.05 MIU/L-ACNC: 2.99 UIU/ML (ref 0.45–4.5)
VIT B12 SERPL-MCNC: 611 PG/ML (ref 100–900)

## 2023-03-22 LAB
25(OH)D3 SERPL-MCNC: 20.4 NG/ML (ref 30–100)
CREAT UR-MCNC: 67 MG/DL
EST. AVERAGE GLUCOSE BLD GHB EST-MCNC: 226 MG/DL
HBA1C MFR BLD: 9.5 %
MICROALBUMIN UR-MCNC: 286 MG/L (ref 0–20)
MICROALBUMIN/CREAT 24H UR: 427 MG/G CREATININE (ref 0–30)

## 2023-03-28 NOTE — PATIENT INSTRUCTIONS
Control de la diabetes tipo 2 en los adultos   CUIDADO AMBULATORIO:   La diabetes tipo 2 es erin enfermedad que afecta la forma en que el cuerpo utiliza la glucosa (azúcar)  El cuerpo no puede producir suficiente insulina o es incapaz de usarla adecuadamente  Es importante controlar la diabetes para evitar el daño al corazón, los vasos sanguíneos y otros órganos  El control lo ayudará a sentirse dre y a disfrutar de mary actividades diarias  Mary médicos del equipo de cuidado de la diabetes pueden ayudarlo a hacer un plan para que el cuidado de la diabetes encaje en kimble horario  Kimble plan puede cambiar con el tiempo para adaptarse a mary necesidades y a las de [address] a alguien que llame al [place] de emergencias local (911 en los [place]) si:  • No es posible despertarlo  • Tiene signos de cetoacidosis diabética:     ? confusión, fatiga    ? vómitos    ? latidos cardíacos rápidos    ? aliento con [employer] a frutas    ? sed extrema    ? sequedad en la boca y la piel    • Tiene alguno de los siguientes signos de un ataque cardíaco:      ? Estrujamiento, presión o tensión en kimble pecho    ? Usted también podría presentar alguno de los siguientes:     - Malestar o dolor en kimble espalda, bryson, mandíbula, abdomen, o brazo    - Falta de aliento    - [employer] o vómitos    - Desvanecimiento o sudor frío repentino    • Usted tiene alguno de los siguientes signos de derrame cerebral:      ? Adormecimiento o caída de un lado de kimble xin    ? Debilidad en un [place] o erin pierna    ? Confusión o debilidad para hablar    ? Mareos o dolor de humberto intenso, o pérdida de la visión  Llame a kimble médico o al equipo de cuidado de la diabetes si:  • Tiene erin llaga o erin herida que no cicatrizan  • Tiene un cambio en la cantidad de orina  • Mary niveles de azúcar en la anabel son superiores a las metas fijadas  • Usted a menudo tiene niveles de azúcar en la anabel más bajos que mary metas fijadas      • Kimble piel está enrojecida, seca, caliente al tacto o inflamada  • Usted tiene problemas para sobrellevar kimble diabetes o se siente ansioso o deprimido  • Usted tiene preguntas o inquietudes acerca de kimble condición o cuidado  Lo que usted necesita saber sobre los niveles altos de azúcar en la anabel: El azúcar alto en la anabel puede no causar ningún síntoma  Puede sentir más sed u orinar con más frecuencia de lo habitual  Con el tiempo, los niveles altos de azúcar en la anabel pueden dañar kristen nervios, vasos sanguíneos, tejidos y órganos  Lo siguiente aumenta los niveles de azúcar en la naabel:  • Comidas copiosas o grandes cantidades de carbohidratos de erin tomasa vez    • Menos actividad física    • Estrés    • Enfermedad    • Erin dosis shauna de medicamento para la diabetes o insulina, o erin dosis tardía    Lo que usted necesita saber sobre los niveles bajos de azúcar en la anabel: Los síntomas incluyen sensación de temblores, [place], irritabilidad o confusión  Puedes prevenir los síntomas evitando que los niveles de azúcar en anabel [place]  • Trate los niveles bajos de azúcar en la anabel inmediatamente:     ? Booker 4 onzas de jugo o 1 tubo de gel de glucosa  ? Revise nuevamente kimble nivel de azúcar en la anabel en 10 a 15 minutos  ? Cuando el nivel regrese a la normalidad, coma un alimento o refrigerio para prevenir otro bajón  • Lleve siempre consigo gel de glucosa, uvas pasas o caramelos duros para tratar los niveles bajos de azúcar en la anabel  • Kimble azúcar en la anabel puede bajar demasiado si katt un medicamento para la diabetes o la insulina y no consume suficientes alimentos  • Si [place] insulina, verifique kimble nivel de azúcar en la anabel antes de hacer ejercicio  ? Si kimble nivel de azúcar en la anabel es inferior a 100 mg/dL, coma 4 galletas, 2 onzas de uvas pasas o booker 4 onzas de jugo  ? Compruebe kimble nivel cada 30 minutos si hace ejercicio nilam más de 1 hora      ? Puede que necesite erin merienda nilam o después de hacer ejercicio  Qué puede hacer para manejar kristen niveles de azúcar en la anabel:  • Revise kristen niveles de azúcar en la anabel según las indicaciones y según sea necesario  Hay varios elementos disponibles que puede utilizar para comprobar kristen [place]  Puede que tenga que comprobarlo probando erin gota de Valentine Enrique en un medidor de glucosa  En kimble lugar, es posible que le den un monitor continuo de glucosa (MCG)  El dispositivo se lleva puesto en todo momento  El MCG revisa kimble nivel de azúcar en la anabel cada 5 minutos  [employer] a un dispositivo electrónico, nayeli un teléfono inteligente  Un MCG puede utilizarse con o sin erin bomba de insulina  Usted y los médicos de kimble equipo de atención de la diabetes decidirán cuál es el mejor método para usted  El objetivo de los niveles de azúcar en la anabel antes de las comidas es entre 80 y 130 mg/dL y 2 horas después de comer  es inferior a 180 mg/dL  • Elija opciones de alimentos saludables  Consulte con kimble dietista para crear un plan de comidas que funcione para usted y kristen horarios  Un dietista puede ayudarlo para que aprenda cómo alimentarse dre con la cantidad [place] de carbohidratos nilam las comidas y [place]  Los carbohidratos pueden subir kimble azúcar en la anabel si usted come demasiado a la vez  Algunos ejemplos de alimentos que contienen carbohidratos son panes, cereales, arroz, pasta y dulces  • Coma alimentos altos en fibras, según las indicaciones  La fibra ayuda a mejorar los niveles de azúcar en la anabel  La fibra también reduce el riesgo de padecer enfermedades cardíacas y [de-identified] problemas que puede causar la diabetes  Por ejemplo, los alimentos ricos en fibra verduras, pan integral y frijoles, nayeli los frijoles pintos  Kimble dietista puede indicarle cuánta fibra debe consumir cada día  • Realice actividad física regularmente   La actividad física puede ayudarlo a alcanzar kimble objetivo de nivel de azúcar en anabel y a controlar kimble peso  Gwen al menos 150 minutos de [place] física Miguel de moderada a vigorosa cada semana  No deje de realizarla nilam más de 2 días seguidos  No permanezca sentada por más de 30 minutos cada vez  Kimble médico puede ayudarle a crear un plan de actividades  El plan puede incluir las mejores actividades para usted y puede ayudarlo a desarrollar kimble fuerza y Euel Coker  • Mantenga un peso saludable  Pregúntele a kimble equipo cuál es el peso ideal para usted  El peso saludable puede ayudarle a controlar kimble diabetes y a evitar erin enfermedad cardíaca  Pregúntele a kimble equipo que lo ayude a elaborar un plan para perder peso de manera si lo necesita  La pérdida de peso puede ayudar a hacer erin diferencia en el control de kimble diabetes  Kimble equipo establecerá erin meta de pérdida de peso, nayeli 10 a 15 libras o 5% de kimble sobrepeso  Juntos, usted y kimble equipo, podrán fijar metas de pérdida de peso alcanzables  • [place]ómese kimble medicamento para la diabetes o la insulina según las indicaciones  Es posible que necesite medicamento para la diabetes, insulina o ambos para controlar kristen niveles de glucosa en anabel  Kimble médico le indicará cómo y cuándo se debe mary kimble medicamento de diabetes o la insulina  También se le enseñarán los efectos secundarios que pueden causar los medicamentos orales para la diabetes  La insulina puede administrarse mediante inyección o erin bomba o erin pluma  Usted y los médicos decidirán cuál es el mejor método para usted:    ? La bomba de insulina es un dispositivo implantado que le da insulina las 24 horas del día  Erin bomba de insulina previene la necesidad de inyecciones múltiples de insulina en un día  ? La pluma para insulina es un dispositivo precargado con la cantidad [place] de insulina  ? A usted y kimble bertha les enseñarán cómo preparar y administrar la insulina si tomasa es el mejor método para usted   Joyce Kidney también le enseñarán cómo desechar las agujas y [place] deb[place]  ? Aprenderá la cantidad de insulina que necesita y cuándo administrarla  Se le enseñará cuándo no administrar la insulina  También se le enseñará lo que debe hacer si kimble nivel de azúcar en la anabel baja demasiado  [place] puede suceder si usted katt insulina y no come la cantidad [place] de carbohidratos  Más maneras para controlar la diabetes tipo 2:  • Use identificación de alerta médica  Use un brazalete o collar de alerta médica o lleve consigo erin tarjeta que indique que tiene diabetes  Pregunte a kimble médico dónde puede conseguir esos artículos  • No fume  La nicotina y otras sustancias químicas de los cigarrillos y los cigarros pueden dañar el pulmón y los vasos sanguíneos  También dificulta el control de la diabetes  Pida información a kimble médico si usted actualmente fuma y necesita ayuda para dejar de fumar  No use cigarrillos electrónicos o tabaco sin humo en vez de cigarrillos o para tratar de dejar de fumar  Todos estos aún contienen nicotina  • Revise kristen pies todos los días por cortadas, raspones, callos u otras heridas  Anahi pendiente de enrojecimiento e inflamación y de calor al tacto  Use zapatos que le calcen dre  Compruebe que no haya piedras u otros objetos dentro de kristen zapatos que le podrían [facility]  No camine descalzo ni use zapatos sin calcetines  Use calcetines de algodón para ayudar a [employer]  • Pregunte sobre las vacunas que pudiera necesitar  Usted corre un mayor riesgo de presentar enfermedades graves si se contagia gripe, neumonía, COVID-19 o hepatitis  Pregunte a kimble médico si debe vacunarse para prevenir estas u otras enfermedades, y cuándo debe hacerlo  • Hable con kimble médico si se siente estresado por el cuidado de la diabetes  A veces, encajar el cuidado de la diabetes en kimble gretchen puede provocar un aumento del estrés  El estrés puede hacer que no se cuide [place]   Los médicos de kimble equipo de atención pueden ayudarlo con consejos sobre el autocuidado  Kristen médicos pueden sugerirle que hable con un profesional de la daljit mental que pueda escuchar y ofrecerle ayuda en cuestiones de autocuidado  • Hágase un control de la A1c según las indicaciones  Kimble médico puede comprobar kimble A1c cada 3 meses, o 2 veces al año si kimble diabetes está controlada  El examen de A1c muestra la cantidad promedio de azúcar en kimble anabel nilam los últimos 2 a 3 meses  Kimble médico le dirá cuál debe ser kimble nivel de A1c     • Hágase las pruebas de detección nayeli se le indique  Kimble médico podría recomendarle que se gwen pruebas para detectar complicaciones de la diabetes y otras condiciones que puedan desarrollarse  Algunas pruebas de detección pueden comenzar inmediatamente y otros pueden ocurrir dentro de los primeros 5 años del diagnóstico:    ? Los ejemplos de complicaciones de la diabetes incluyen problemas renales, colesterol alto, presión arterial jerri, problemas vasculares, problemas oculares y apnea del sueño  ? Se le puede hacer erin prueba para detectar niveles bajos de vitamina B si katt medicamentos orales para la diabetes nilam mucho tiempo  ? [employer] en edad fértil pueden hacerse pruebas para detectar síndrome del ovario poliquístico (SOPQ)  Gwen un seguimiento con kimble médico o con los proveedores del equipo de cuidado de la diabetes según las instrucciones: Es posible que a usted le debbie análisis de anabel antes de la dory de control  [employer] de las pruebas mostrarán si es necesario hacer cambios en el tratamiento o en los cuidados personales  Hable con kimble médico si no puede pagar kristen medicamentos  Anote kristen preguntas para que se acuerde de hacerlas nilam kristen visitas  © [facility] 2022 Information is for End User's use only and may not be sold, redistributed or otherwise used for commercial purposes  Esta información es sólo para uso en educación   Kimble intención no es darle un consejo médico sobre enfermedades o tratamientos  Colsulte con kimble Rianna Pares farmacéutico antes de seguir cualquier régimen médico para saber si es seguro y efectivo para usted

## 2023-04-06 DIAGNOSIS — E11.8 TYPE 2 DIABETES MELLITUS WITH COMPLICATION (HCC): ICD-10-CM

## 2023-04-08 RX ORDER — GABAPENTIN 300 MG/1
CAPSULE ORAL
Qty: 540 CAPSULE | Refills: 0 | Status: SHIPPED | OUTPATIENT
Start: 2023-04-08

## 2023-04-25 DIAGNOSIS — E78.5 HYPERLIPIDEMIA, UNSPECIFIED HYPERLIPIDEMIA TYPE: ICD-10-CM

## 2023-04-25 RX ORDER — ATORVASTATIN CALCIUM 40 MG/1
TABLET, FILM COATED ORAL
Qty: 90 TABLET | Refills: 3 | Status: SHIPPED | OUTPATIENT
Start: 2023-04-25

## 2023-05-10 DIAGNOSIS — E11.8 TYPE 2 DIABETES MELLITUS WITH COMPLICATION (HCC): Primary | ICD-10-CM

## 2023-05-14 DIAGNOSIS — E53.8 B12 DEFICIENCY: ICD-10-CM

## 2023-05-15 RX ORDER — CYANOCOBALAMIN 1000 UG/ML
INJECTION INTRAMUSCULAR; SUBCUTANEOUS
Qty: 10 ML | Refills: 5 | Status: SHIPPED | OUTPATIENT
Start: 2023-05-15

## 2023-05-22 ENCOUNTER — CLINICAL SUPPORT (OUTPATIENT)
Dept: FAMILY MEDICINE CLINIC | Facility: CLINIC | Age: 63
End: 2023-05-22

## 2023-05-22 DIAGNOSIS — E11.8 TYPE 2 DIABETES MELLITUS WITH COMPLICATION (HCC): Primary | ICD-10-CM

## 2023-05-22 NOTE — PROGRESS NOTES
[address]    Melissa Montes is a 65 y o  male who was referred to the pharmacist for DM education and management, referred by Cooper Bumpers, MD      Telemedicine consent  The patient was identified by name and date of birth  Melissa Montes was informed that this is a telemedicine visit and that the visit is being conducted through Telephone  My office door was closed  No one else was in the room  He acknowledged consent and understanding of privacy and security of the video platform  The patient has agreed to participate and understands they can discontinue the visit at any time  Assessment/ Plan     1  Type 2 Diabetes:  • Goal A1c <7% based on ADA guidelines  Most recent A1c above goal at 8 5% (8/26/22) however has been trending down  Due for repeat A1c  · Reported Home SMBG  o Morning average remains elevated like due to bedtime snack  Discussed cutting out bedtime snack or limiting to 15 grams of carbs  Right now he is having a bologna sandwich before bed each night  We discussed how this is likely causing his blood sugar readings to elevate  · Complications:  o Microvascular complications: neuropathy, CKD   o Macrovascular complications: peripheral circulatory disorder  o Medication options  - Hx CKD  On [facility] which is indicated for renal benefits    - Future consideration-   • He would also benefit from GLP-1 agonist as well for additional weight loss/ A1c lowering  He would need to obtain this through patient assistance program and unfortunately [facility] program is no longer accepting new applicants at this time due to drug shortages  • If next GFR increases above 45 can also re-initiate metformin  · Medication Cost  · Approved for [employer] patient assistance program through 2023 calendar year    Changes to Medication Regimen: If PCP is in agreement  · Continue current medications  · Decrease carbs with bedtime snack  2  Need for Additional Therapies:  • Statin: atorvastatin 40 mg daily  Last LDL 47 which is at goal    • ACEI/ARB: on lisinopril 10 mg daily  Microalbuminuria present on last urine chem  • ASA: yes   • Neuropathy: Gabapentin 300 mg - 2 caps TID     3  Health Maintenance:  • Eye Exam: up to date  • Foot Exam: Due as of 2/25/23  • Urine Microalbumin: up to date    4  Medication Reconciliation:   · Medication list reviewed with pt at today's visit  • Medication list updated to reflect medications pt is currently taking    Monitoring: testing 4x daily    Follow-up: 12 weeks    Subjective     1  Medication Adherence/ Tolerability:  • Novolin 70/30 mix vial- Injects BID (lunch and dinner)  o < 150 : no insulin  o If : 40-50 units  o If BG > 200 mg/dL: 100 units  o If BG > 300 mg/dL: 120 units  o Self adjusts insulin dose based on blood sugar and what he is going to eat   • Dapagliflozin 10 mg daily    Review of Systems   Endocrine: Negative for polydipsia, polyphagia and polyuria  All other systems reviewed and are negative  Medications Tried in Past:  Janumet- cost constraints   Metformin- d/c due to NEREYDA      2  Lifestyle:   · Eating more salads  Most days having 2 meals and bedtime snack  · Morning: drinking coffee with splenda and milk  · 12 PM Lunch- usually a salad but sometimes will have sandwich and iced tea or lemonade (sugar free)   · 5 PM Dinner - Usually pork chops with potatoes    · Snack at 12 PM- bologna sandwich   · Goes to bed at ~12 PM     3  Home monitoring devices  • Glucometer: Yes,   • CGM: No, Patient would benefit however is only on BID injections (insurance requires TID injections  • BP monitor: unknown     Objective     Lab Results   Component Value Date    HGBA1C 9 5 (H) 03/21/2023    HGBA1C 9 5 (H) 03/06/2023    HGBA1C 8 5 (H) 08/26/2022       Blood Glucose Readings  The patient is currently checking blood glucose 4 times per day  Patient reports with SMBG logs      Date AM (5:30 AM)  Lunch  (12 PM) Dinner  (5:30 PM) Post-  Dinner   5/1 361 232 76 104    232 186 120 107    297 111 124 131    330 177 181 128    318 97 111 107    338 102 198 131    182 160 156 107    360 195 104 115    270 163 70 121    209 184 134 117    218 153 362 131    134 816 138 862    282 16 226 680    933 877 35 199    518 763 42 110    256 99 167 115    275 107 284 101    115 270 127 101    228 122 256 123    347 159 120 113   5/22 250      Avg 260 167 152 115     Hypoglycemia: The patient had several episodes/symptoms of hypoglycemia in early Feb  He has decreased his insulin since then and hypoglycemia episodes have decreased  ASCVD Risk:  The ASCVD Risk score (Perla OATES, et al , 2019) failed to calculate for the following reasons: The valid total cholesterol range is 130 to 320 mg/dL     Vitals: There were no vitals filed for this visit      Labs:    Lab Results   Component Value Date    SODIUM 136 03/21/2023    K 5 0 03/21/2023    EGFR 46 03/21/2023    CREATININE 1 59 (H) 03/21/2023    GLUF 160 (H) 03/21/2023    VMVVDYUC00 611 03/21/2023    MICROALBCRE 427 (H) 03/21/2023         Pharmacist Tracking Tool     Pharmacist Tracking Tool  Reason For Outreach: Embedded Pharmacist  Demographics:  Intervention Method: Phone  Type of Intervention: Follow-Up  Topics Addressed: Diabetes  Pharmacologic Interventions: N/A  Non-Pharmacologic Interventions: Disease state education, Home Monitoring and Medication/Device education  Time:  Direct Patient Care: 25 mins  Care Coordination: 15 mins  Recommendation Recipient: N/A  Outcome: N/A

## 2023-06-23 ENCOUNTER — VBI (OUTPATIENT)
Dept: ADMINISTRATIVE | Facility: OTHER | Age: 63
End: 2023-06-23

## 2023-06-26 ENCOUNTER — TELEPHONE (OUTPATIENT)
Dept: FAMILY MEDICINE CLINIC | Facility: CLINIC | Age: 63
End: 2023-06-26

## 2023-06-26 NOTE — TELEPHONE ENCOUNTER
Pt called stating that he went to get labs done and they told him that he didn't have labs done  He stated he has lab slips from 3/17/23  I advised him that he had those drawn on 3/21/23  He stated why would you give me papers if I didn't need labs  He kept going on how its costing him so much to go back and forth with getting labs  I told him I could send a message asking if Dr Desmond Salazar wanted him to get labs before Friday in which he said that I can let Dr Desmond Salazar know I am the reason he doesn't have his labs done for his appt   And then he hung up

## 2023-06-30 ENCOUNTER — OFFICE VISIT (OUTPATIENT)
Dept: FAMILY MEDICINE CLINIC | Facility: CLINIC | Age: 63
End: 2023-06-30
Payer: COMMERCIAL

## 2023-06-30 VITALS
OXYGEN SATURATION: 95 % | HEIGHT: 70 IN | TEMPERATURE: 97.4 F | HEART RATE: 87 BPM | DIASTOLIC BLOOD PRESSURE: 78 MMHG | BODY MASS INDEX: 36.33 KG/M2 | SYSTOLIC BLOOD PRESSURE: 166 MMHG | WEIGHT: 253.8 LBS | RESPIRATION RATE: 16 BRPM

## 2023-06-30 DIAGNOSIS — E11.42 DIABETIC POLYNEUROPATHY ASSOCIATED WITH TYPE 2 DIABETES MELLITUS (HCC): ICD-10-CM

## 2023-06-30 DIAGNOSIS — N18.31 STAGE 3A CHRONIC KIDNEY DISEASE (HCC): ICD-10-CM

## 2023-06-30 DIAGNOSIS — I10 PRIMARY HYPERTENSION: Primary | ICD-10-CM

## 2023-06-30 DIAGNOSIS — E11.8 TYPE 2 DIABETES MELLITUS WITH COMPLICATION (HCC): ICD-10-CM

## 2023-06-30 DIAGNOSIS — E78.2 MIXED HYPERLIPIDEMIA: ICD-10-CM

## 2023-06-30 RX ORDER — MULTIVIT WITH MINERALS/LUTEIN
1000 TABLET ORAL DAILY
COMMUNITY

## 2023-06-30 NOTE — PROGRESS NOTES
Name: Tristan Gottlieb      : 1960      MRN: [id number]  Encounter Provider: Stacie Ball MD  Encounter Date: 2023   Encounter department: [address] [place] [address]  Primary hypertension  Assessment & Plan:  BP not at goal, but he is angry today in the office  Orders:  -     Lipid panel; Future  -     Comprehensive metabolic panel; Future; Expected date: 2023  -     TSH, 3rd generation with Free T4 reflex; Future  -     HEMOGLOBIN A1C W/ EAG ESTIMATION; Future  -     HEMOGLOBIN A1C W/ EAG ESTIMATION; Standing  -     Lipid panel; Standing  -     Comprehensive metabolic panel; Standing  -     HEMOGLOBIN A1C W/ EAG ESTIMATION  -     Lipid panel  -     Comprehensive metabolic panel    2  Type 2 diabetes mellitus with complication (HCC)  -     Lipid panel; Future  -     Comprehensive metabolic panel; Future; Expected date: 2023  -     TSH, 3rd generation with Free T4 reflex; Future  -     HEMOGLOBIN A1C W/ EAG ESTIMATION; Future  -     HEMOGLOBIN A1C W/ EAG ESTIMATION; Standing  -     Lipid panel; Standing  -     Comprehensive metabolic panel; Standing  -     HEMOGLOBIN A1C W/ EAG ESTIMATION  -     Lipid panel  -     Comprehensive metabolic panel    3  Mixed hyperlipidemia  Assessment & Plan:  LDL at goal   Continue current  Orders:  -     Lipid panel; Future  -     Comprehensive metabolic panel; Future; Expected date: 2023  -     TSH, 3rd generation with Free T4 reflex; Future  -     HEMOGLOBIN A1C W/ EAG ESTIMATION; Future  -     HEMOGLOBIN A1C W/ EAG ESTIMATION; Standing  -     Lipid panel; Standing  -     Comprehensive metabolic panel; Standing  -     HEMOGLOBIN A1C W/ EAG ESTIMATION  -     Lipid panel  -     Comprehensive metabolic panel    4  Diabetic polyneuropathy associated with type 2 diabetes mellitus [facility])  Assessment & Plan:    Lab Results   Component Value Date    HGBA1C 9 5 (H) 2023   Continue to work on A1c    Refer to "diabetic education  5  Stage 3a chronic kidney disease [facility])  Assessment & Plan:  Lab Results   Component Value Date    EGFR 46 03/21/2023    EGFR 35 03/06/2023    EGFR 44 12/05/2022    CREATININE 1 59 (H) 03/21/2023    CREATININE 1 96 (H) 03/06/2023    CREATININE 1 65 (H) 12/05/2022   GFR stable  Continue current  Subjective      His A1c is not at goal   He has no side effects or hypoglycemia  He knows the s/s of hypoglycemia  His lipids are at goal   He has normal LFTs and no myalgia or muscle weakness  His BP is mildly elevated but he is upset about the lab not drawing his labs  Review of Systems   All other systems reviewed and are negative  Current Outpatient Medications on File Prior to Visit   Medication Sig   • atorvastatin (LIPITOR) 40 mg tablet take 1 tablet by mouth once daily   • B-D SYRINGE/NEEDLE 3CC/22GX1 5 22G X 1-1/2\" 3 ML MISC use as directed   • Blood Glucose Monitoring Suppl (OneTouch Verio Flex System) w/Device KIT USE AS DIRECTED BY MD TESTING FOUR TIMES DAILY   • Dapagliflozin Propanediol 10 MG TABS Take 1 tablet (10 mg total) by mouth daily   • diphenhydrAMINE-acetaminophen (TYLENOL PM)  MG TABS Take 2 tablets by mouth daily at bedtime as needed for sleep   • Dodex 1000 MCG/ML inject 1 milliliter ( 1000 MCG ) intramuscularly Every Month   • gabapentin (NEURONTIN) 300 mg capsule TAKE 2 CAPSULES BY MOUTH THREE TIMES DAILY   • ibuprofen (MOTRIN) 800 mg tablet Take 1 tablet (800 mg total) by mouth every 6 (six) hours as needed for mild pain   • lisinopril (ZESTRIL) 10 mg tablet Take 1 tablet (10 mg total) by mouth daily   • NovoLIN 70/30 ReliOn (70-30) 100 UNIT/ML subcutaneous injection Inject 75- 100 units twice daily   • OneTouch Delica Lancets 32M MISC Check blood sugars four times daily  Please substitute with appropriate alternative as covered by patient's insurance  Dx: E11 65   • OneTouch Verio test strip CHECK BLOOD SUGARS FOUR TIMES DAILY    PLEASE " "SUBSTITUTE WITH APPROPRIATE ALTERNATIVE AS COVERED BY PATIENT'S INSURANCE   • vitamin E, tocopherol, 1,000 units capsule Take 1,000 Units by mouth daily       Objective     /78 (BP Location: Left arm, Patient Position: Sitting, Cuff Size: Standard)   Pulse 87   Temp (!) 97 4 °F (36 3 °C) (Tympanic)   Resp 16   Ht 5' 10\" (1 778 m)   Wt 115 kg (253 lb 12 8 oz)   SpO2 95%   BMI 36 42 kg/m²     Physical Exam  Vitals and nursing note reviewed  Constitutional:       Appearance: Normal appearance  HENT:      Head: Normocephalic and atraumatic  Cardiovascular:      Rate and Rhythm: Normal rate and regular rhythm  Pulses: Normal pulses  Heart sounds: Normal heart sounds  Pulmonary:      Effort: Pulmonary effort is normal       Breath sounds: Normal breath sounds  Abdominal:      General: Abdomen is flat  Bowel sounds are normal       Palpations: Abdomen is soft  Musculoskeletal:         General: Normal range of motion  Cervical back: Normal range of motion and neck supple  Skin:     General: Skin is warm and dry  Capillary Refill: Capillary refill takes less than 2 seconds  Neurological:      General: No focal deficit present  Mental Status: He is alert and oriented to person, place, and time  Mental status is at baseline  Psychiatric:         Mood and Affect: Mood normal          Behavior: Behavior normal          Thought Content:  Thought content normal          Judgment: Judgment normal        Alysha Mckeon MD  "

## 2023-07-01 NOTE — ASSESSMENT & PLAN NOTE
Lab Results   Component Value Date    HGBA1C 9 5 (H) 03/21/2023   Continue to work on A1c  Refer to diabetic education

## 2023-07-01 NOTE — PROGRESS NOTES
Diabetic Foot Exam    Patient's shoes and socks removed  Right Foot/Ankle   Right Foot Inspection  Skin Exam: skin normal and skin intact  No dry skin, no warmth, no callus, no erythema, no maceration, no abnormal color, no pre-ulcer, no ulcer and no callus  Sensory   Vibration: absent  Proprioception: absent  Monofilament testing: absent    Vascular  Capillary refills: < 3 seconds  The right DP pulse is 1+  The right PT pulse is 1+  Left Foot/Ankle  Left Foot Inspection  Skin Exam: skin normal and skin intact  No dry skin, no warmth, no erythema, no maceration, normal color, no pre-ulcer, no ulcer and no callus  Sensory   Vibration: absent  Proprioception: absent  Monofilament testing: absent    Vascular  Capillary refills: < 3 seconds  The left DP pulse is 1+  The left PT pulse is 1+       Assign Risk Category  No deformity present  Loss of protective sensation  Weak pulses  Risk: 2

## 2023-07-01 NOTE — ASSESSMENT & PLAN NOTE
Lab Results   Component Value Date    EGFR 46 03/21/2023    EGFR 35 03/06/2023    EGFR 44 12/05/2022    CREATININE 1 59 (H) 03/21/2023    CREATININE 1 96 (H) 03/06/2023    CREATININE 1 65 (H) 12/05/2022   GFR stable  Continue current

## 2023-07-18 NOTE — PROGRESS NOTES
Assessment/Plan:    No problem-specific Assessment & Plan notes found for this encounter  Diagnoses and all orders for this visit:    Type 2 diabetes mellitus with other specified complication, with long-term current use of insulin ([facility] )  -     POCT hemoglobin A1c  -     Microalbumin / creatinine urine ratio  -     Lipid panel; Future  -     Comprehensive metabolic panel; Future  -     HEMOGLOBIN A1C W/ EAG ESTIMATION; Future  -     TSH, 3rd generation; Future    Diabetic polyneuropathy associated with type 2 diabetes mellitus ([facility] )  -     Lipid panel; Future  -     Comprehensive metabolic panel; Future  -     HEMOGLOBIN A1C W/ EAG ESTIMATION; Future  -     TSH, 3rd generation; Future    Essential hypertension  -     Lipid panel; Future  -     Comprehensive metabolic panel; Future  -     HEMOGLOBIN A1C W/ EAG ESTIMATION; Future  -     TSH, 3rd generation; Future    Mixed hyperlipidemia  -     Lipid panel; Future  -     Comprehensive metabolic panel; Future  -     HEMOGLOBIN A1C W/ EAG ESTIMATION; Future  -     TSH, 3rd generation; Future          Subjective:      Patient ID: Angelina Chawla is a 63 y o  male  His A1c is not at goal but has improved significantly from his previous results  He has no side effects and no hypoglycemia  His BP is at goal on his current regimen  He has no cough  He denies CP or SOB  He has no HA or vision changes  His lipids are at goal on his current regimen  He is on "high-intensity" statin therapy  He has no myalgia or muscle weakness  His LFTs are within normal limits  The following portions of the patient's history were reviewed and updated as appropriate:   He  has no past medical history on file    He   Patient Active Problem List    Diagnosis Date Noted    Medicare annual wellness visit, initial 07/31/2019    Diabetic polyneuropathy associated with type 2 diabetes mellitus ([facility] ) 03/29/2019    Diabetes ([facility] ) 09/28/2018    Hyperlipidemia 09/28/2018    Hypertension 09/28/2018     He  has a past surgical history that includes Appendectomy; Back surgery; Elbow surgery; Tonsillectomy; and Wrist surgery  His family history includes Diabetes in his father and mother; Heart disease in his mother; Stroke in his brother and sister  He  reports that he has never smoked  He has never used smokeless tobacco  His alcohol and drug histories are not on file  Current Outpatient Medications   Medication Sig Dispense Refill    atorvastatin (LIPITOR) 40 mg tablet Take 1 tablet (40 mg total) by mouth daily 90 tablet 3    DULoxetine (CYMBALTA) 30 mg delayed release capsule Take 1 capsule (30 mg total) by mouth daily 30 capsule 5    gabapentin (NEURONTIN) 300 mg capsule Take 1 capsule (300 mg total) by mouth 3 (three) times a day 90 capsule 5    gabapentin (NEURONTIN) 300 mg capsule TAKE 2 CAPSULES BY MOUTH 3 TIMES A  capsule 1    glipiZIDE-metFORMIN (METAGLIP) 5-500 MG per tablet TAKE 2 TABLETS BY MOUTH TWO TIMES A DAY WITH MEALS 360 tablet 1    ibuprofen (MOTRIN) 800 mg tablet Take 1 tablet (800 mg total) by mouth every 6 (six) hours as needed for mild pain 120 tablet 5    lisinopril (ZESTRIL) 10 mg tablet Take 1 tablet (10 mg total) by mouth daily 90 tablet 3    NOVOLIN 70/30 RELION (70-30) 100 UNIT/ML subcutaneous injection Inject 70 Units under the skin 2 (two) times a day 40 mL 5    gabapentin (NEURONTIN) 300 mg capsule Take 2 capsules (600 mg total) by mouth 3 (three) times a day (Patient not taking: Reported on 10/30/2019) 180 capsule 5     No current facility-administered medications for this visit        Current Outpatient Medications on File Prior to Visit   Medication Sig    atorvastatin (LIPITOR) 40 mg tablet Take 1 tablet (40 mg total) by mouth daily    DULoxetine (CYMBALTA) 30 mg delayed release capsule Take 1 capsule (30 mg total) by mouth daily    gabapentin (NEURONTIN) 300 mg capsule Take 1 capsule (300 mg total) by mouth 3 (three) times a day    gabapentin (NEURONTIN) 300 mg capsule TAKE 2 CAPSULES BY MOUTH 3 TIMES A DAY    glipiZIDE-metFORMIN (METAGLIP) 5-500 MG per tablet TAKE 2 TABLETS BY MOUTH TWO TIMES A DAY WITH MEALS    ibuprofen (MOTRIN) 800 mg tablet Take 1 tablet (800 mg total) by mouth every 6 (six) hours as needed for mild pain    lisinopril (ZESTRIL) 10 mg tablet Take 1 tablet (10 mg total) by mouth daily    NOVOLIN 70/30 RELION (70-30) 100 UNIT/ML subcutaneous injection Inject 70 Units under the skin 2 (two) times a day    gabapentin (NEURONTIN) 300 mg capsule Take 2 capsules (600 mg total) by mouth 3 (three) times a day (Patient not taking: Reported on 10/30/2019)     No current facility-administered medications on file prior to visit  He has No Known Allergies       Review of Systems   All other systems reviewed and are negative  Objective:      /76   Pulse 85   Resp 16   Ht 5' 10" (1 778 m)   Wt 117 kg (259 lb)   SpO2 98%   BMI 37 16 kg/m²          Physical Exam   Constitutional: He is oriented to person, place, and time  He appears well-developed and well-nourished  Neck: Normal range of motion  Neck supple  Cardiovascular: Normal rate, regular rhythm, normal heart sounds and intact distal pulses  Pulmonary/Chest: Effort normal and breath sounds normal    Abdominal: Soft  Bowel sounds are normal    Musculoskeletal: Normal range of motion  Neurological: He is alert and oriented to person, place, and time  Skin: Skin is warm and dry  Capillary refill takes less than 2 seconds  Psychiatric: He has a normal mood and affect  His behavior is normal  Judgment and thought content normal    Nursing note and vitals reviewed  Vietnamese

## 2023-07-21 ENCOUNTER — TELEPHONE (OUTPATIENT)
Dept: FAMILY MEDICINE CLINIC | Facility: CLINIC | Age: 63
End: 2023-07-21

## 2023-07-21 DIAGNOSIS — E11.51 TYPE II DIABETES MELLITUS WITH PERIPHERAL CIRCULATORY DISORDER (HCC): ICD-10-CM

## 2023-07-21 NOTE — TELEPHONE ENCOUNTER
Pt calling needing an RX for his dapaglifozin Propanediol faxed to the [facility] with a bunch of refills.     Fax number to send the rx to is [phone]

## 2023-08-08 DIAGNOSIS — E11.8 TYPE 2 DIABETES MELLITUS WITH COMPLICATION (HCC): ICD-10-CM

## 2023-08-08 RX ORDER — GABAPENTIN 300 MG/1
CAPSULE ORAL
Qty: 540 CAPSULE | Refills: 0 | Status: SHIPPED | OUTPATIENT
Start: 2023-08-08

## 2023-08-13 DIAGNOSIS — E11.8 TYPE 2 DIABETES MELLITUS WITH COMPLICATION (HCC): ICD-10-CM

## 2023-08-14 RX ORDER — BLOOD SUGAR DIAGNOSTIC
STRIP MISCELLANEOUS
Qty: 400 EACH | Refills: 0 | Status: SHIPPED | OUTPATIENT
Start: 2023-08-14

## 2023-09-13 DIAGNOSIS — E11.8 TYPE 2 DIABETES MELLITUS WITH COMPLICATION (HCC): ICD-10-CM

## 2023-09-13 RX ORDER — HUMAN INSULIN 100 [IU]/ML
INJECTION, SUSPENSION SUBCUTANEOUS
Qty: 50 ML | Refills: 0 | Status: SHIPPED | OUTPATIENT
Start: 2023-09-13

## 2023-09-15 ENCOUNTER — APPOINTMENT (OUTPATIENT)
Dept: LAB | Facility: CLINIC | Age: 63
End: 2023-09-15
Payer: COMMERCIAL

## 2023-09-15 DIAGNOSIS — E55.9 VITAMIN D DEFICIENCY, UNSPECIFIED: ICD-10-CM

## 2023-09-15 DIAGNOSIS — N18.31 CHRONIC KIDNEY DISEASE (CKD) STAGE G3A/A1, MODERATELY DECREASED GLOMERULAR FILTRATION RATE (GFR) BETWEEN 45-59 ML/MIN/1.73 SQUARE METER AND ALBUMINURIA CREATININE RATIO LESS THAN 30 MG/G (HCC): ICD-10-CM

## 2023-09-15 PROCEDURE — 80048 BASIC METABOLIC PNL TOTAL CA: CPT

## 2023-09-15 PROCEDURE — 82306 VITAMIN D 25 HYDROXY: CPT

## 2023-09-15 PROCEDURE — 36415 COLL VENOUS BLD VENIPUNCTURE: CPT

## 2023-09-16 LAB
25(OH)D3 SERPL-MCNC: 31.3 NG/ML (ref 30–100)
ANION GAP SERPL CALCULATED.3IONS-SCNC: 11 MMOL/L
BUN SERPL-MCNC: 39 MG/DL (ref 5–25)
CALCIUM SERPL-MCNC: 10.6 MG/DL (ref 8.4–10.2)
CHLORIDE SERPL-SCNC: 103 MMOL/L (ref 96–108)
CO2 SERPL-SCNC: 22 MMOL/L (ref 21–32)
CREAT SERPL-MCNC: 1.75 MG/DL (ref 0.6–1.3)
GFR SERPL CREATININE-BSD FRML MDRD: 40 ML/MIN/1.73SQ M
GLUCOSE P FAST SERPL-MCNC: 264 MG/DL (ref 65–99)
POTASSIUM SERPL-SCNC: 4.7 MMOL/L (ref 3.5–5.3)
SODIUM SERPL-SCNC: 136 MMOL/L (ref 135–147)

## 2023-10-02 ENCOUNTER — OFFICE VISIT (OUTPATIENT)
Dept: FAMILY MEDICINE CLINIC | Facility: CLINIC | Age: 63
End: 2023-10-02
Payer: COMMERCIAL

## 2023-10-02 VITALS
OXYGEN SATURATION: 93 % | TEMPERATURE: 98.5 F | BODY MASS INDEX: 36.3 KG/M2 | RESPIRATION RATE: 18 BRPM | HEART RATE: 84 BPM | WEIGHT: 253 LBS

## 2023-10-02 DIAGNOSIS — Z00.00 MEDICARE ANNUAL WELLNESS VISIT, SUBSEQUENT: Primary | ICD-10-CM

## 2023-10-02 DIAGNOSIS — R29.818 NEUROGENIC CLAUDICATION: ICD-10-CM

## 2023-10-02 DIAGNOSIS — Z23 ENCOUNTER FOR IMMUNIZATION: ICD-10-CM

## 2023-10-02 DIAGNOSIS — N18.31 STAGE 3A CHRONIC KIDNEY DISEASE (HCC): ICD-10-CM

## 2023-10-02 DIAGNOSIS — E66.01 OBESITY, MORBID (HCC): ICD-10-CM

## 2023-10-02 DIAGNOSIS — I10 PRIMARY HYPERTENSION: ICD-10-CM

## 2023-10-02 DIAGNOSIS — E78.2 MIXED HYPERLIPIDEMIA: ICD-10-CM

## 2023-10-02 DIAGNOSIS — I73.9 CLAUDICATION (HCC): ICD-10-CM

## 2023-10-02 DIAGNOSIS — E11.42 DIABETIC POLYNEUROPATHY ASSOCIATED WITH TYPE 2 DIABETES MELLITUS (HCC): ICD-10-CM

## 2023-10-02 DIAGNOSIS — E11.51 TYPE II DIABETES MELLITUS WITH PERIPHERAL CIRCULATORY DISORDER (HCC): ICD-10-CM

## 2023-10-02 DIAGNOSIS — E11.8 TYPE 2 DIABETES MELLITUS WITH COMPLICATION (HCC): ICD-10-CM

## 2023-10-02 PROCEDURE — 90686 IIV4 VACC NO PRSV 0.5 ML IM: CPT | Performed by: FAMILY MEDICINE

## 2023-10-02 PROCEDURE — G0439 PPPS, SUBSEQ VISIT: HCPCS | Performed by: FAMILY MEDICINE

## 2023-10-02 PROCEDURE — 99215 OFFICE O/P EST HI 40 MIN: CPT | Performed by: FAMILY MEDICINE

## 2023-10-02 PROCEDURE — G0008 ADMIN INFLUENZA VIRUS VAC: HCPCS | Performed by: FAMILY MEDICINE

## 2023-10-02 RX ORDER — HUMAN INSULIN 100 [IU]/ML
INJECTION, SUSPENSION SUBCUTANEOUS
Qty: 50 ML | Refills: 3 | Status: SHIPPED | OUTPATIENT
Start: 2023-10-02

## 2023-10-02 NOTE — PROGRESS NOTES
Name: Robe Cisneros      : 1960      MRN: [id number]  Encounter Provider: Jose Luis Davis MD  Encounter Date: 10/2/2023   Encounter department: [address] [place] [address]     1. Diabetic polyneuropathy associated with type 2 diabetes mellitus ([address])  -     Lipid panel; Future  -     Comprehensive metabolic panel; Future; Expected date: 10/03/2023  -     TSH, 3rd generation with Free T4 reflex; Future  -     CBC and differential; Future    2. Type 2 diabetes mellitus with complication (HCC)  -     Lipid panel; Future  -     Comprehensive metabolic panel; Future; Expected date: 10/03/2023  -     TSH, 3rd generation with Free T4 reflex; Future  -     CBC and differential; Future    3. Encounter for immunization  -     influenza vaccine, quadrivalent, 0.5 mL, preservative-free, for adult and pediatric patients 6 mos+ (AFLURIA, FLUARIX, FLULAVAL, FLUZONE)    4. Type II diabetes mellitus with peripheral circulatory disorder (HCC)  -     Lipid panel; Future  -     Comprehensive metabolic panel; Future; Expected date: 10/03/2023  -     TSH, 3rd generation with Free T4 reflex; Future  -     CBC and differential; Future    5. Primary hypertension  -     Lipid panel; Future  -     Comprehensive metabolic panel; Future; Expected date: 10/03/2023  -     TSH, 3rd generation with Free T4 reflex; Future  -     CBC and differential; Future    6. Stage 3a chronic kidney disease (HCC)  -     Lipid panel; Future  -     Comprehensive metabolic panel; Future; Expected date: 10/03/2023  -     TSH, 3rd generation with Free T4 reflex; Future  -     CBC and differential; Future    7. Mixed hyperlipidemia  -     Lipid panel; Future  -     Comprehensive metabolic panel; Future; Expected date: 10/03/2023  -     TSH, 3rd generation with Free T4 reflex; Future  -     CBC and differential; Future    8.  Obesity, morbid ([address])           Subjective      His A1c is not at goal.  He has no side effects or hypoglycemic events. His BP is at goal on his current regimen. He has no CP or SOB. He has HA or SOB. His lipids are at goal on his current regimen. He has normal LFTs and no myalgia or muscle weakness. He is having pain in the right leg. It feels heavy, "like I'm dragging a log with me."  The pain is throbbing. Review of Systems   All other systems reviewed and are negative. Current Outpatient Medications on File Prior to Visit   Medication Sig   • atorvastatin (LIPITOR) 40 mg tablet take 1 tablet by mouth once daily   • B-D SYRINGE/NEEDLE 3CC/22GX1.5 22G X 1-1/2" 3 ML MISC use as directed   • Blood Glucose Monitoring Suppl (OneTouch Verio Flex System) w/Device KIT USE AS DIRECTED BY MD TESTING FOUR TIMES DAILY   • Cholecalciferol 25 MCG (1000 UT) capsule Take 1,000 Units by mouth   • dapagliflozin (Farxiga) 10 MG tablet Take 1 tablet (10 mg total) by mouth daily   • diphenhydrAMINE-acetaminophen (TYLENOL PM)  MG TABS Take 2 tablets by mouth daily at bedtime as needed for sleep   • Dodex 1000 MCG/ML inject 1 milliliter ( 1000 MCG ) intramuscularly Every Month   • gabapentin (NEURONTIN) 300 mg capsule TAKE 2 CAPSULES BY MOUTH THREE TIMES DAILY   • glucose blood (OneTouch Verio) test strip CHECK BLOOD SUGARS FOUR TIMES DAILY. • ibuprofen (MOTRIN) 800 mg tablet Take 1 tablet (800 mg total) by mouth every 6 (six) hours as needed for mild pain   • lisinopril (ZESTRIL) 10 mg tablet Take 1 tablet (10 mg total) by mouth daily   • NovoLIN 70/30 ReliOn (70-30) 100 UNIT/ML subcutaneous injection INJECT 75  UNITS SUBCUTANEOUSLY TWICE DAILY   • OneTouch Delica Lancets 70U MISC Check blood sugars four times daily. Please substitute with appropriate alternative as covered by patient's insurance.  Dx: E11.65   • vitamin E, tocopherol, 1,000 units capsule Take 1,000 Units by mouth daily   • ascorbic acid (VITAMIN C) 1000 MG tablet Take 2,000 mg by mouth       Objective     Pulse 84   Temp 98.5 °F (36.9 °C) (Temporal)   Resp 18   Wt 115 kg (253 lb)   SpO2 93%   BMI 36.30 kg/m²     Physical Exam  Vitals and nursing note reviewed. Constitutional:       Appearance: Normal appearance. He is obese. Cardiovascular:      Rate and Rhythm: Normal rate and regular rhythm. Pulses: Normal pulses. Heart sounds: Normal heart sounds. Pulmonary:      Effort: Pulmonary effort is normal.      Breath sounds: Normal breath sounds. Abdominal:      General: Abdomen is flat. Bowel sounds are normal.      Palpations: Abdomen is soft. Musculoskeletal:         General: Normal range of motion. Cervical back: Normal range of motion and neck supple. Skin:     General: Skin is warm and dry. Capillary Refill: Capillary refill takes less than 2 seconds. Neurological:      General: No focal deficit present. Mental Status: He is alert and oriented to person, place, and time. Mental status is at baseline. Psychiatric:         Mood and Affect: Mood normal.         Behavior: Behavior normal.         Thought Content:  Thought content normal.         Judgment: Judgment normal.       Albert López MD

## 2023-10-02 NOTE — PROGRESS NOTES
Assessment and Plan:     Problem List Items Addressed This Visit    None  Visit Diagnoses     Type 2 diabetes mellitus with complication ([address])        Encounter for immunization               Preventive health issues were discussed with patient, and age appropriate screening tests were ordered as noted in patient's After Visit Summary. Personalized health advice and appropriate referrals for health education or preventive services given if needed, as noted in patient's After Visit Summary.      History of Present Illness:     Patient presents for a Medicare Wellness Visit    HPI   Patient Care Team:  Shahzad Hedrick MD as PCP - General Amanuel Posada Pharmacist as Pharmacist (Pharmacy)     Review of Systems:     Review of Systems     Problem List:     Patient Active Problem List   Diagnosis   • Hyperlipidemia   • Primary hypertension   • Diabetic polyneuropathy associated with type 2 diabetes mellitus ([address])   • Medicare annual wellness visit, initial   • Obesity, morbid ([address])   • Stage 3a chronic kidney disease ([address])   • Type II diabetes mellitus with peripheral circulatory disorder ([address])   • Heart murmur   • Hypogonadism in male      Past Medical and Surgical History:     Past Medical History:   Diagnosis Date   • Diabetic foot ulcer with osteomyelitis ([address]) 9/28/2018    Overview:  type 2     Past Surgical History:   Procedure Laterality Date   • APPENDECTOMY     • BACK SURGERY     • ELBOW SURGERY     • TONSILLECTOMY     • WRIST SURGERY        Family History:     Family History   Problem Relation Age of Onset   • Heart disease Mother         cardiac   • Diabetes Mother    • Diabetes Father    • Stroke Sister    • Stroke Brother       Social History:     Social History     Socioeconomic History   • Marital status: /Civil Union     Spouse name: None   • Number of children: None   • Years of education: None   • Highest education level: None   Occupational History   • None   Tobacco Use   • Smoking status: Former     Years: 30.00     Types: Cigarettes   • Smokeless tobacco: Never   Vaping Use   • Vaping Use: Never used   Substance and Sexual Activity   • Alcohol use: Not Currently   • Drug use: Never   • Sexual activity: Yes     Partners: Female   Other Topics Concern   • None   Social History Narrative   • None     Social Determinants of Health     Financial Resource Strain: Not on file   Food Insecurity: Not on file   Transportation Needs: Not on file   Physical Activity: Not on file   Stress: Not on file   Social Connections: Not on file   Intimate Partner Violence: Not on file   Housing Stability: Not on file      Medications and Allergies:     Current Outpatient Medications   Medication Sig Dispense Refill   • atorvastatin (LIPITOR) 40 mg tablet take 1 tablet by mouth once daily 90 tablet 3   • B-D SYRINGE/NEEDLE 3CC/22GX1.5 22G X 1-1/2" 3 ML MISC use as directed 100 each 3   • Blood Glucose Monitoring Suppl (OneTouch Verio Flex System) w/Device KIT USE AS DIRECTED BY MD TESTING FOUR TIMES DAILY     • Cholecalciferol 25 MCG (1000 UT) capsule Take 1,000 Units by mouth     • dapagliflozin (Farxiga) 10 MG tablet Take 1 tablet (10 mg total) by mouth daily 90 tablet 3   • diphenhydrAMINE-acetaminophen (TYLENOL PM)  MG TABS Take 2 tablets by mouth daily at bedtime as needed for sleep     • Dodex 1000 MCG/ML inject 1 milliliter ( 1000 MCG ) intramuscularly Every Month 10 mL 5   • gabapentin (NEURONTIN) 300 mg capsule TAKE 2 CAPSULES BY MOUTH THREE TIMES DAILY 540 capsule 0   • glucose blood (OneTouch Verio) test strip CHECK BLOOD SUGARS FOUR TIMES DAILY.  400 each 0   • ibuprofen (MOTRIN) 800 mg tablet Take 1 tablet (800 mg total) by mouth every 6 (six) hours as needed for mild pain 360 tablet 3   • lisinopril (ZESTRIL) 10 mg tablet Take 1 tablet (10 mg total) by mouth daily 90 tablet 3   • NovoLIN 70/30 ReliOn (70-30) 100 UNIT/ML subcutaneous injection INJECT 75  UNITS SUBCUTANEOUSLY TWICE DAILY 50 mL 0   • OneTouch Delica Lancets 32N MISC Check blood sugars four times daily. Please substitute with appropriate alternative as covered by patient's insurance. Dx: E11.65 400 each 3   • vitamin E, tocopherol, 1,000 units capsule Take 1,000 Units by mouth daily     • ascorbic acid (VITAMIN C) 1000 MG tablet Take 2,000 mg by mouth       No current facility-administered medications for this visit. No Known Allergies   Immunizations:     Immunization History   Administered Date(s) Administered   • COVID-19 J&J (Coy) vaccine 0.5 mL 06/16/2021   • INFLUENZA 01/18/2018, 11/19/2021   • Influenza Quadrivalent Preservative Free 3 years and older IM 01/18/2018   • Influenza, recombinant, quadrivalent,injectable, preservative free 09/28/2018, 10/30/2019, 11/06/2020, 11/19/2021, 10/11/2022   • Pneumococcal Conjugate 13-Valent 11/06/2020   • Pneumococcal Polysaccharide PPV23 09/28/2018   • Tdap 08/26/2021      Health Maintenance:         Topic Date Due   • Colorectal Cancer Screening  Never done   • HIV Screening  Completed   • Hepatitis C Screening  Completed         Topic Date Due   • COVID-19 Vaccine (2 - Booster for Coy series) 08/11/2021   • Influenza Vaccine (1) 09/01/2023      Medicare Screening Tests and Risk Assessments:     Brody Cueto is here for his Subsequent Wellness visit. Last Medicare Wellness visit information reviewed, patient interviewed and updates made to the record today. Fall Risk Screening: In the past year, patient has experienced: no history of falling in past year      Home Safety:  Patient has trouble with stairs inside or outside of their home. Patient has working smoke alarms and has working carbon monoxide detector. Home safety hazards include: none. Nutrition:   Current diet is Diabetic. Medications:   Patient is not currently taking any over-the-counter supplements. Patient is able to manage medications.      Activities of Daily Living (ADLs)/Instrumental Activities of Daily Living (IADLs):   Walk and transfer into and out of bed and chair?: Yes  Dress and groom yourself?: Yes    Bathe or shower yourself?: Yes    Feed yourself? Yes  Do your laundry/housekeeping?: Yes  Manage your money, pay your bills and track your expenses?: Yes  Make your own meals?: Yes    Do your own shopping?: Yes    Cognitive Screening:   Provider or family/friend/caregiver concerned regarding cognition?: No    PREVENTIVE SCREENINGS      Cardiovascular Screening:    General: Screening Not Indicated and History Lipid Disorder      Diabetes Screening:     General: Screening Not Indicated and History Diabetes      Colorectal Cancer Screening:     General: Patient Declines      Prostate Cancer Screening:    General: Patient Declines      Osteoporosis Screening:    General: Patient Declines      Abdominal Aortic Aneurysm (AAA) Screening:    Risk factors include: tobacco use        Lung Cancer Screening:     General: Screening Not Indicated      Hepatitis C Screening:    General: Screening Current    Screening, Brief Intervention, and Referral to Treatment (SBIRT)    Screening  Typical number of drinks in a day: 0    No results found.      Physical Exam:     Pulse 84   Temp 98.5 °F (36.9 °C) (Temporal)   Resp 18   Wt 115 kg (253 lb)   SpO2 93%   BMI 36.30 kg/m²     Physical Exam     Tadeo Hackett MD

## 2023-10-04 ENCOUNTER — APPOINTMENT (OUTPATIENT)
Dept: LAB | Facility: CLINIC | Age: 63
End: 2023-10-04
Payer: COMMERCIAL

## 2023-10-04 DIAGNOSIS — E83.52 HYPERCALCEMIA: ICD-10-CM

## 2023-10-04 LAB
CA-I BLD-SCNC: 1.25 MMOL/L (ref 1.12–1.32)
PTH-INTACT SERPL-MCNC: 25.6 PG/ML (ref 12–88)

## 2023-10-04 PROCEDURE — 86334 IMMUNOFIX E-PHORESIS SERUM: CPT

## 2023-10-04 PROCEDURE — 83970 ASSAY OF PARATHORMONE: CPT

## 2023-10-04 PROCEDURE — 36415 COLL VENOUS BLD VENIPUNCTURE: CPT

## 2023-10-04 PROCEDURE — 83521 IG LIGHT CHAINS FREE EACH: CPT

## 2023-10-04 PROCEDURE — 82330 ASSAY OF CALCIUM: CPT

## 2023-10-04 PROCEDURE — 84165 PROTEIN E-PHORESIS SERUM: CPT

## 2023-10-04 PROCEDURE — 82652 VIT D 1 25-DIHYDROXY: CPT

## 2023-10-05 LAB
1,25(OH)2D3 SERPL-MCNC: 26 PG/ML (ref 24.8–81.5)
ALBUMIN SERPL ELPH-MCNC: 4.18 G/DL (ref 3.2–5.1)
ALBUMIN SERPL ELPH-MCNC: 57.2 % (ref 48–70)
ALPHA1 GLOB SERPL ELPH-MCNC: 0.24 G/DL (ref 0.15–0.47)
ALPHA1 GLOB SERPL ELPH-MCNC: 3.3 % (ref 1.8–7)
ALPHA2 GLOB SERPL ELPH-MCNC: 0.95 G/DL (ref 0.42–1.04)
ALPHA2 GLOB SERPL ELPH-MCNC: 13 % (ref 5.9–14.9)
BETA GLOB ABNORMAL SERPL ELPH-MCNC: 0.42 G/DL (ref 0.31–0.57)
BETA1 GLOB SERPL ELPH-MCNC: 5.7 % (ref 4.7–7.7)
BETA2 GLOB SERPL ELPH-MCNC: 5.6 % (ref 3.1–7.9)
BETA2+GAMMA GLOB SERPL ELPH-MCNC: 0.41 G/DL (ref 0.2–0.58)
GAMMA GLOB ABNORMAL SERPL ELPH-MCNC: 1.11 G/DL (ref 0.4–1.66)
GAMMA GLOB SERPL ELPH-MCNC: 15.2 % (ref 6.9–22.3)
IGG/ALB SER: 1.34 {RATIO} (ref 1.1–1.8)
KAPPA LC FREE SER-MCNC: 68.9 MG/L (ref 3.3–19.4)
KAPPA LC FREE/LAMBDA FREE SER: 1.94 {RATIO} (ref 0.26–1.65)
LAMBDA LC FREE SERPL-MCNC: 35.6 MG/L (ref 5.7–26.3)
M PROTEIN 1 MFR SERPL ELPH: 1.6 %
M PROTEIN 1 SERPL ELPH-MCNC: 0.12 G/DL
PROT PATTERN SERPL ELPH-IMP: NORMAL
PROT SERPL-MCNC: 7.3 G/DL (ref 6.4–8.4)

## 2023-10-05 PROCEDURE — 86334 IMMUNOFIX E-PHORESIS SERUM: CPT | Performed by: PATHOLOGY

## 2023-10-05 PROCEDURE — 84165 PROTEIN E-PHORESIS SERUM: CPT | Performed by: PATHOLOGY

## 2023-10-06 DIAGNOSIS — E11.8 TYPE 2 DIABETES MELLITUS WITH COMPLICATION (HCC): ICD-10-CM

## 2023-10-06 LAB — INTERPRETATION UR IFE-IMP: NORMAL

## 2023-10-06 RX ORDER — BLOOD SUGAR DIAGNOSTIC
STRIP MISCELLANEOUS
Qty: 400 EACH | Refills: 0 | Status: SHIPPED | OUTPATIENT
Start: 2023-10-06

## 2023-10-31 NOTE — PATIENT INSTRUCTIONS
Control de la diabetes tipo 2 en los adultos   CUIDADO AMBULATORIO:   La diabetes tipo 2 es erin enfermedad que afecta la forma en que el cuerpo utiliza la glucosa (azúcar). El cuerpo no puede producir suficiente insulina o es incapaz de usarla adecuadamente. Es importante controlar la diabetes para evitar el daño al corazón, los vasos sanguíneos y otros órganos. El control lo ayudará a sentirse dre y a disfrutar de mary actividades diarias. Mary médicos del equipo de cuidado de la diabetes pueden ayudarlo a hacer un plan para que el cuidado de la diabetes encaje en kimble horario. Kimble plan puede cambiar con el tiempo para adaptarse a mary necesidades y a las de [address]. Pídale a alguien que llame al [facility] de emergencias local (911 en los [place]) si:  No es posible despertarlo. Tiene signos de cetoacidosis diabética:     confusión, fatiga    vómitos    latidos cardíacos rápidos    aliento con [employer] a frutas    sed extrema    sequedad en la boca y la piel    Tiene alguno de los siguientes signos de un ataque cardíaco:      Estrujamiento, presión o tensión en kimble pecho    Usted también podría presentar alguno de los siguientes:     Malestar o dolor en kimble espalda, bryson, mandíbula, abdomen, o brazo    Falta de [employer] o vómitos    Desvanecimiento o sudor frío repentino    Usted tiene alguno de los siguientes signos de derrame cerebral:      Adormecimiento o caída de un lado de kimble xin    Debilidad en un brazo o erin pierna    Confusión o debilidad para hablar    Mareos o dolor de humberto intenso, o pérdida de la visión. Llame a kimble médico o al equipo de cuidado de la diabetes si:  Tiene erin llaga o erin herida que no cicatrizan. Tiene un cambio en la cantidad de [facility]. Mary niveles de azúcar en la anabel son superiores a las metas fijadas. Usted a menudo tiene niveles de azúcar en la anabel más bajos que mary metas fijadas.     Kimble piel está enrojecida, seca, caliente al tacto o inflamada. Usted tiene problemas para sobrellevar kimble diabetes o se siente ansioso o deprimido. Tiene problemas para seguir alguna parte de kimble plan de atención, nayeli el plan de comidas. Usted tiene preguntas o inquietudes acerca de kimble condición o cuidado. Lo que usted necesita saber sobre los niveles altos de azúcar en la anabel: El azúcar alto en la anabel puede no causar ningún síntoma. Puede sentir más sed u orinar con más frecuencia de lo habitual. Con el tiempo, los niveles altos de azúcar en la anabel pueden dañar kristen nervios, vasos sanguíneos, tejidos y órganos. Lo siguiente aumenta los niveles de azúcar en la anabel:  Comidas copiosas o grandes cantidades de carbohidratos de erin tomasa vez    Menos actividad física    Estrés    Enfermedad    Erin dosis shauna de medicamento para la diabetes o Russel mawr, o erin dosis tardía    Lo que usted necesita saber sobre los niveles bajos de azúcar en la anabel: Los síntomas incluyen sensación de temblores, Jusam, irritabilidad o confusión. Puedes prevenir los síntomas evitando que los niveles de azúcar en anabel [place]. Trate los niveles bajos de azúcar en la anabel inmediatamente:     Booker 4 onzas de jugo o 1 tubo de gel de glucosa. Revise nuevamente ikmble nivel de azúcar en la anabel en 10 a 15 minutos. Cuando el nivel regrese a la normalidad, coma un alimento o refrigerio para prevenir otro bajón. Lleve siempre consigo gel de glucosa, uvas pasas o caramelos duros para tratar los niveles bajos de azúcar en la anabel. Kimble azúcar en la anabel puede bajar demasiado si katt un medicamento para la diabetes o la insulina y no consume suficientes alimentos. Si [place] insulina, verifique kimble nivel de azúcar en la anabel antes de hacer ejercicio. Si kimble nivel de azúcar en la anabel es inferior a 100 mg/dL, coma 4 galletas, 2 onzas de uvas pasas o booker 4 onzas de jugo.     Compruebe kimble nivel cada 30 minutos si hace ejercicio nilam más de 1 hora.    Puede que necesite erin merienda nilam o después de hacer ejercicio. Qué puede hacer para manejar kristen niveles de azúcar en la anabel:  Revise kristen niveles de azúcar en la anabel según las indicaciones y según sea necesario. Hay varios elementos disponibles que puede utilizar para comprobar kristen [place]. Puede que tenga que comprobarlo probando erin gota de Valentine Enrique en un medidor de glucosa. En kimble lugar, es posible que le den un monitor continuo de glucosa (MCG). El dispositivo se lleva puesto en todo momento. El MCG revisa kimble nivel de azúcar en la anabel cada 5 minutos. [employer] a un dispositivo electrónico, nayeli un teléfono inteligente. Un MCG puede utilizarse con o sin erin bomba de insulina. Usted y los médicos de kimble equipo de atención de la diabetes decidirán cuál es el mejor método para usted. El objetivo es lograr que los niveles de azúcar en anabel antes de las comidas estén  entre 80 y 130 mg/dL y 2 horas después de comer  pat inferiores a 180 mg/dL. Elija opciones de alimentos saludables. Consulte con kimble dietista para crear un plan de comidas que funcione para usted y kristen horarios. Un dietista puede ayudarlo para que aprenda cómo alimentarse dre con la cantidad [place] de carbohidratos (azúcar y los alimentos que contienen almidón) nilam las comidas y [place]. Algunos ejemplos de carbohidratos son el pan, los cereales, el arroz, la pasta, la fruta, los lácteos bajos en grasa y los [place]. Los carbohidratos pueden subir kimble azúcar en la anabel si usted come demasiado a la vez. Coma alimentos altos en fibras, según las indicaciones. La fibra ayuda a mejorar los niveles de azúcar en la anabel. La fibra también reduce el riesgo de padecer enfermedades cardíacas y otros problemas que puede causar la diabetes. Por ejemplo, los alimentos ricos en fibra verduras, pan integral y frijoles, nayeli los frijoles pintos.  Kimble dietista puede indicarle cuánta fibra debe consumir cada día.         Realice actividad física regularmente. La actividad física puede ayudarlo a alcanzar kimble objetivo de nivel de azúcar en anabel y a controlar kimble peso. Gwen al menos 150 minutos de [place] física Miguel de moderada a vigorosa cada semana. El entrenamiento de resistencia, nayeli el levantamiento de pesas, debe realizarse 3 veces por semana. No deje de realizarla nilam más de 2 días seguidos. No permanezca sentada por más de 30 minutos cada vez. Kimble médico puede ayudarle a crear un plan de actividades. El plan puede incluir las mejores actividades para usted y puede ayudarlo a desarrollar kimble fuerza y Sharda Bhanu. Mantenga un peso saludable. Pregúntele a kimble equipo cuál es el peso ideal para usted. El peso saludable puede ayudarle a controlar kimble diabetes y a evitar erin enfermedad cardíaca. Pídale a kimble equipo que lo ayude a elaborar un plan para perder peso, si lo necesita. Incluso reducir del 3% al 7% de kimble peso corporal puede ayudarlo a hacer erin diferencia en el control de kimble diabetes. Kimble equipo establecerá erin meta de pérdida de [place], de entre 10 a 15 libras o de un 5% de kimble sobrepeso. Juntos, usted y kimble equipo, podrán fijar metas de pérdida de peso alcanzables. Tómese kimble medicamento para la diabetes o la insulina según las indicaciones. Es posible que necesite medicamento para la diabetes, insulina o ambos para controlar kristen niveles de glucosa en anabel. Kimble médico le indicará cómo y cuándo se debe mary kimble medicamento de diabetes o la insulina. También se le enseñarán los efectos secundarios que pueden causar los medicamentos orales para la diabetes. La insulina puede administrarse mediante inyección o erin bomba o erin pluma. Usted y los médicos decidirán cuál es el mejor método para usted:    La bomba de insulina es un dispositivo implantado que le da insulina las 24 horas del día. Erin bomba de insulina previene la necesidad de inyecciones múltiples de insulina en un día.          Percilla Closs para insulina es un dispositivo precargado con la cantidad [place] de insulina. A usted y kimble bertha les enseñarán cómo preparar y administrar la insulina si tomasa es el mejor método para usted. Mary médicos también le enseñarán cómo [facility] las agujas y [place]. Aprenderá la cantidad de insulina que necesita y cuándo administrarla. Se le enseñará cuándo no administrar la insulina. También se le enseñará lo que debe hacer si kimble nivel de azúcar en la anabel baja demasiado. [place] puede suceder si usted katt insulina y no come la cantidad [place] de carbohidratos. Más maneras para controlar la diabetes tipo 2:  Use identificación de alerta médica. Use un brazalete o collar de alerta médica o lleve consigo erin tarjeta que indique que tiene diabetes. Pregunte a kimble médico dónde puede conseguir esos artículos. No fume. La nicotina y otras sustancias químicas de los cigarrillos y los cigarros pueden dañar el pulmón y los vasos sanguíneos. También dificulta el control de la diabetes. Pida información a kimble médico si usted actualmente fuma y necesita ayuda para dejar de fumar. No use cigarrillos electrónicos o tabaco sin humo en vez de cigarrillos o para tratar de dejar de fumar. Todos estos aún contienen nicotina. Revise mary pies todos los días por cortadas, raspones, callos u otras heridas. Tomasa pendiente de enrojecimiento e inflamación y de calor al tacto. Use zapatos que le calcen dre. Compruebe que no haya piedras u otros objetos dentro de mary zapatos que le podrían Icinetic. No camine descalzo ni use zapatos sin calcetines. Use calcetines de algodón para ayudar a [place]. Pregunte sobre las vacunas que pudiera necesitar. Usted corre un mayor riesgo de presentar enfermedades graves si se contagia gripe, neumonía, COVID-19 o hepatitis. Pregunte a kimble médico si debe vacunarse para prevenir estas u otras enfermedades, y cuándo debe hacerlo.     Hable con kimble médico si se siente estresado por el cuidado de la diabetes. A veces, encajar el cuidado de la diabetes en kimble gretchen puede provocar un aumento del estrés. El estrés puede hacer que no se cuide [place]. Kimble médico puede ayudarlo con consejos sobre el autocuidado. El profesional de la daljit mental puede escuchar y ofrecer ayuda en cuestiones de cuidado personal. Otros tipos de terapia pueden ayudarlo a realizar cambios nutricionales o en kimble actividad física. Hágase un control de la A1c según las indicaciones. Kimble médico puede comprobar kimble A1c cada 3 meses, o 2 veces al año si kimble diabetes está controlada. El examen de A1c muestra la cantidad promedio de azúcar en kimble anabel nilam los últimos 2 a 3 meses. Kimble médico le dirá cuál debe ser kimble nivel de A1c. Hágase las pruebas de detección nayeli se le indique. Kimble médico podría recomendarle que se gwen pruebas para detectar complicaciones de la diabetes y otras condiciones que puedan desarrollarse. Algunas pruebas de detección pueden comenzar inmediatamente y otros pueden ocurrir dentro de los primeros 5 años del diagnóstico:    Los ejemplos de complicaciones de la diabetes incluyen problemas renales, colesterol alto, presión arterial jerri, problemas vasculares, problemas oculares y apnea del sueño. Se le puede hacer erin prueba para detectar niveles bajos de vitamina B si katt medicamentos orales para la diabetes nilam mucho tiempo. Pueden hacerle pruebas para detectar el síndrome de ovario poliquístico (SOPQ) si tiene edad fecunda. Gwen un seguimiento con kimble médico o con los proveedores del equipo de cuidado de la diabetes según las instrucciones: Es posible que a usted le debbie análisis de anabel antes de la dory de control. [employer] de las pruebas mostrarán si es necesario hacer cambios en el tratamiento o en los cuidados personales. Hable con kimble médico si no puede pagar kristen medicamentos.  Anote kristen preguntas para que se acuerde de hacerlas nilam kristen visitas. © Copyright [facility] [[email or web address]]e 2023 Information is for End User's use only and may not be sold, redistributed or otherwise used for commercial purposes. Esta información es sólo para uso en educación. Kimble intención no es darle un consejo médico sobre enfermedades o tratamientos. Colsulte con kimble Jeraldene Steve farmacéutico antes de seguir cualquier régimen médico para saber si es seguro y efectivo para usted.

## 2023-11-02 DIAGNOSIS — E11.8 TYPE 2 DIABETES MELLITUS WITH COMPLICATION (HCC): ICD-10-CM

## 2023-11-02 RX ORDER — GABAPENTIN 300 MG/1
CAPSULE ORAL
Qty: 540 CAPSULE | Refills: 0 | Status: SHIPPED | OUTPATIENT
Start: 2023-11-02

## 2023-11-07 ENCOUNTER — HOSPITAL ENCOUNTER (OUTPATIENT)
Dept: NON INVASIVE DIAGNOSTICS | Facility: HOSPITAL | Age: 63
Discharge: HOME | End: 2023-11-07
Payer: COMMERCIAL

## 2023-11-07 ENCOUNTER — HOSPITAL ENCOUNTER (OUTPATIENT)
Dept: MRI IMAGING | Facility: HOSPITAL | Age: 63
Discharge: HOME | End: 2023-11-07
Payer: COMMERCIAL

## 2023-11-07 ENCOUNTER — LAB (OUTPATIENT)
Dept: LAB | Facility: HOSPITAL | Age: 63
End: 2023-11-07
Payer: COMMERCIAL

## 2023-11-07 DIAGNOSIS — E11.42 DIABETIC POLYNEUROPATHY ASSOCIATED WITH TYPE 2 DIABETES MELLITUS (HCC): ICD-10-CM

## 2023-11-07 DIAGNOSIS — I10 PRIMARY HYPERTENSION: ICD-10-CM

## 2023-11-07 DIAGNOSIS — I73.9 CLAUDICATION (HCC): ICD-10-CM

## 2023-11-07 DIAGNOSIS — E11.8 TYPE 2 DIABETES MELLITUS WITH COMPLICATION (HCC): ICD-10-CM

## 2023-11-07 DIAGNOSIS — E11.51 TYPE II DIABETES MELLITUS WITH PERIPHERAL CIRCULATORY DISORDER (HCC): ICD-10-CM

## 2023-11-07 DIAGNOSIS — N18.31 STAGE 3A CHRONIC KIDNEY DISEASE (HCC): ICD-10-CM

## 2023-11-07 DIAGNOSIS — R29.818 NEUROGENIC CLAUDICATION: ICD-10-CM

## 2023-11-07 DIAGNOSIS — E78.2 MIXED HYPERLIPIDEMIA: ICD-10-CM

## 2023-11-07 LAB
ALBUMIN SERPL BCP-MCNC: 4.3 G/DL (ref 3.5–5)
ALP SERPL-CCNC: 66 U/L (ref 34–104)
ALT SERPL W P-5'-P-CCNC: 20 U/L (ref 7–52)
ANION GAP SERPL CALCULATED.3IONS-SCNC: 7 MMOL/L
AST SERPL W P-5'-P-CCNC: 21 U/L (ref 13–39)
BILIRUB SERPL-MCNC: 0.43 MG/DL (ref 0.2–1)
BUN SERPL-MCNC: 27 MG/DL (ref 5–25)
CALCIUM SERPL-MCNC: 9.8 MG/DL (ref 8.4–10.2)
CHLORIDE SERPL-SCNC: 99 MMOL/L (ref 96–108)
CO2 SERPL-SCNC: 27 MMOL/L (ref 21–32)
CREAT SERPL-MCNC: 1.53 MG/DL (ref 0.6–1.3)
GFR SERPL CREATININE-BSD FRML MDRD: 48 ML/MIN/1.73SQ M
GLUCOSE SERPL-MCNC: 362 MG/DL (ref 65–140)
POTASSIUM SERPL-SCNC: 5.2 MMOL/L (ref 3.5–5.3)
PROT SERPL-MCNC: 7.5 G/DL (ref 6.4–8.4)
SODIUM SERPL-SCNC: 133 MMOL/L (ref 135–147)

## 2023-11-07 PROCEDURE — A9585 GADOBUTROL INJECTION: HCPCS | Performed by: FAMILY MEDICINE

## 2023-11-07 PROCEDURE — G1004 CDSM NDSC: HCPCS

## 2023-11-07 PROCEDURE — 93925 LOWER EXTREMITY STUDY: CPT

## 2023-11-07 PROCEDURE — 36415 COLL VENOUS BLD VENIPUNCTURE: CPT

## 2023-11-07 PROCEDURE — 80053 COMPREHEN METABOLIC PANEL: CPT

## 2023-11-07 PROCEDURE — 93923 UPR/LXTR ART STDY 3+ LVLS: CPT

## 2023-11-07 PROCEDURE — 72158 MRI LUMBAR SPINE W/O & W/DYE: CPT

## 2023-11-07 RX ORDER — GADOBUTROL 604.72 MG/ML
11 INJECTION INTRAVENOUS
Status: COMPLETED | OUTPATIENT
Start: 2023-11-07 | End: 2023-11-07

## 2023-11-07 RX ADMIN — GADOBUTROL 11 ML: 604.72 INJECTION INTRAVENOUS at 11:10

## 2023-11-11 PROCEDURE — 93922 UPR/L XTREMITY ART 2 LEVELS: CPT | Performed by: SURGERY

## 2023-11-11 PROCEDURE — 93925 LOWER EXTREMITY STUDY: CPT | Performed by: SURGERY

## 2023-11-13 ENCOUNTER — TELEPHONE (OUTPATIENT)
Dept: FAMILY MEDICINE CLINIC | Facility: CLINIC | Age: 63
End: 2023-11-13

## 2024-01-12 ENCOUNTER — APPOINTMENT (OUTPATIENT)
Dept: LAB | Facility: CLINIC | Age: 64
End: 2024-01-12
Payer: COMMERCIAL

## 2024-01-12 DIAGNOSIS — E78.2 MIXED HYPERLIPIDEMIA: ICD-10-CM

## 2024-01-12 DIAGNOSIS — N18.31 STAGE 3A CHRONIC KIDNEY DISEASE (HCC): ICD-10-CM

## 2024-01-12 DIAGNOSIS — I10 PRIMARY HYPERTENSION: ICD-10-CM

## 2024-01-12 DIAGNOSIS — E11.51 TYPE II DIABETES MELLITUS WITH PERIPHERAL CIRCULATORY DISORDER (HCC): ICD-10-CM

## 2024-01-12 DIAGNOSIS — E11.8 TYPE 2 DIABETES MELLITUS WITH COMPLICATION (HCC): ICD-10-CM

## 2024-01-12 DIAGNOSIS — E11.42 DIABETIC POLYNEUROPATHY ASSOCIATED WITH TYPE 2 DIABETES MELLITUS (HCC): ICD-10-CM

## 2024-01-12 LAB
ALBUMIN SERPL BCP-MCNC: 4.3 G/DL (ref 3.5–5)
ALP SERPL-CCNC: 58 U/L (ref 34–104)
ALT SERPL W P-5'-P-CCNC: 26 U/L (ref 7–52)
ANION GAP SERPL CALCULATED.3IONS-SCNC: 11 MMOL/L
AST SERPL W P-5'-P-CCNC: 26 U/L (ref 13–39)
BASOPHILS # BLD AUTO: 0.07 THOUSANDS/ÂΜL (ref 0–0.1)
BASOPHILS NFR BLD AUTO: 1 % (ref 0–1)
BILIRUB SERPL-MCNC: 0.27 MG/DL (ref 0.2–1)
BUN SERPL-MCNC: 28 MG/DL (ref 5–25)
CALCIUM SERPL-MCNC: 9.3 MG/DL (ref 8.4–10.2)
CHLORIDE SERPL-SCNC: 105 MMOL/L (ref 96–108)
CHOLEST SERPL-MCNC: 126 MG/DL
CO2 SERPL-SCNC: 23 MMOL/L (ref 21–32)
CREAT SERPL-MCNC: 1.4 MG/DL (ref 0.6–1.3)
EOSINOPHIL # BLD AUTO: 0.18 THOUSAND/ÂΜL (ref 0–0.61)
EOSINOPHIL NFR BLD AUTO: 2 % (ref 0–6)
ERYTHROCYTE [DISTWIDTH] IN BLOOD BY AUTOMATED COUNT: 12.6 % (ref 11.6–15.1)
EST. AVERAGE GLUCOSE BLD GHB EST-MCNC: 260 MG/DL
GFR SERPL CREATININE-BSD FRML MDRD: 53 ML/MIN/1.73SQ M
GLUCOSE P FAST SERPL-MCNC: 165 MG/DL (ref 65–99)
HBA1C MFR BLD: 10.7 %
HCT VFR BLD AUTO: 43.3 % (ref 36.5–49.3)
HDLC SERPL-MCNC: 59 MG/DL
HGB BLD-MCNC: 13.4 G/DL (ref 12–17)
IMM GRANULOCYTES # BLD AUTO: 0.02 THOUSAND/UL (ref 0–0.2)
IMM GRANULOCYTES NFR BLD AUTO: 0 % (ref 0–2)
LDLC SERPL CALC-MCNC: 41 MG/DL (ref 0–100)
LYMPHOCYTES # BLD AUTO: 2.47 THOUSANDS/ÂΜL (ref 0.6–4.47)
LYMPHOCYTES NFR BLD AUTO: 31 % (ref 14–44)
MCH RBC QN AUTO: 31.6 PG (ref 26.8–34.3)
MCHC RBC AUTO-ENTMCNC: 30.9 G/DL (ref 31.4–37.4)
MCV RBC AUTO: 102 FL (ref 82–98)
MONOCYTES # BLD AUTO: 0.69 THOUSAND/ÂΜL (ref 0.17–1.22)
MONOCYTES NFR BLD AUTO: 9 % (ref 4–12)
NEUTROPHILS # BLD AUTO: 4.52 THOUSANDS/ÂΜL (ref 1.85–7.62)
NEUTS SEG NFR BLD AUTO: 57 % (ref 43–75)
NONHDLC SERPL-MCNC: 67 MG/DL
NRBC BLD AUTO-RTO: 0 /100 WBCS
PLATELET # BLD AUTO: 254 THOUSANDS/UL (ref 149–390)
PMV BLD AUTO: 10.6 FL (ref 8.9–12.7)
POTASSIUM SERPL-SCNC: 4.7 MMOL/L (ref 3.5–5.3)
PROT SERPL-MCNC: 7.3 G/DL (ref 6.4–8.4)
RBC # BLD AUTO: 4.24 MILLION/UL (ref 3.88–5.62)
SODIUM SERPL-SCNC: 139 MMOL/L (ref 135–147)
TRIGL SERPL-MCNC: 128 MG/DL
TSH SERPL DL<=0.05 MIU/L-ACNC: 2.74 UIU/ML (ref 0.45–4.5)
WBC # BLD AUTO: 7.95 THOUSAND/UL (ref 4.31–10.16)

## 2024-01-12 PROCEDURE — 85025 COMPLETE CBC W/AUTO DIFF WBC: CPT

## 2024-01-12 PROCEDURE — 84443 ASSAY THYROID STIM HORMONE: CPT

## 2024-01-15 ENCOUNTER — TELEPHONE (OUTPATIENT)
Dept: FAMILY MEDICINE CLINIC | Facility: CLINIC | Age: 64
End: 2024-01-15

## 2024-01-15 ENCOUNTER — RA CDI HCC (OUTPATIENT)
Dept: OTHER | Facility: HOSPITAL | Age: 64
End: 2024-01-15

## 2024-01-15 NOTE — PROGRESS NOTES
HCC coding opportunities          Chart Reviewed number of suggestions sent to Provider: 2   E11.22  E11.65    Patients Insurance     Medicare Insurance: [employer]

## 2024-01-15 NOTE — TELEPHONE ENCOUNTER
Left message letting patient know that his A1C is too high and to please attend his appointment scheduled for Friday so Dr can discuss his results.  If he cannot make he appointment to please call the office ASAP to reschedule.

## 2024-01-16 ENCOUNTER — TELEPHONE (OUTPATIENT)
Dept: FAMILY MEDICINE CLINIC | Facility: CLINIC | Age: 64
End: 2024-01-16

## 2024-01-16 ENCOUNTER — TELEPHONE (OUTPATIENT)
Dept: ADMINISTRATIVE | Facility: OTHER | Age: 64
End: 2024-01-16

## 2024-01-16 NOTE — TELEPHONE ENCOUNTER
Checking to see if patient had a recent DM Eye exam.  Per [facility] patients last DM Eye exam was 04/26/2021 nothing more recent and nothing scheduled.

## 2024-01-19 ENCOUNTER — OFFICE VISIT (OUTPATIENT)
Dept: FAMILY MEDICINE CLINIC | Facility: CLINIC | Age: 64
End: 2024-01-19
Payer: COMMERCIAL

## 2024-01-19 VITALS
OXYGEN SATURATION: 92 % | HEART RATE: 82 BPM | DIASTOLIC BLOOD PRESSURE: 78 MMHG | WEIGHT: 258.6 LBS | HEIGHT: 71 IN | TEMPERATURE: 97.7 F | BODY MASS INDEX: 36.2 KG/M2 | SYSTOLIC BLOOD PRESSURE: 150 MMHG

## 2024-01-19 DIAGNOSIS — E11.51 TYPE II DIABETES MELLITUS WITH PERIPHERAL CIRCULATORY DISORDER (HCC): Primary | ICD-10-CM

## 2024-01-19 DIAGNOSIS — E78.2 MIXED HYPERLIPIDEMIA: ICD-10-CM

## 2024-01-19 DIAGNOSIS — E11.42 DIABETIC POLYNEUROPATHY ASSOCIATED WITH TYPE 2 DIABETES MELLITUS (HCC): ICD-10-CM

## 2024-01-19 DIAGNOSIS — D47.2 MONOCLONAL GAMMOPATHIES: ICD-10-CM

## 2024-01-19 DIAGNOSIS — E11.8 TYPE 2 DIABETES MELLITUS WITH COMPLICATION (HCC): ICD-10-CM

## 2024-01-19 DIAGNOSIS — N18.31 STAGE 3A CHRONIC KIDNEY DISEASE (HCC): ICD-10-CM

## 2024-01-19 DIAGNOSIS — I10 PRIMARY HYPERTENSION: ICD-10-CM

## 2024-01-19 DIAGNOSIS — E66.01 OBESITY, MORBID (HCC): ICD-10-CM

## 2024-01-19 DIAGNOSIS — N18.31 CHRONIC KIDNEY DISEASE, STAGE 3A (HCC): ICD-10-CM

## 2024-01-19 PROCEDURE — 99214 OFFICE O/P EST MOD 30 MIN: CPT | Performed by: FAMILY MEDICINE

## 2024-01-19 RX ORDER — VITAMIN B COMPLEX
1 CAPSULE ORAL DAILY
COMMUNITY

## 2024-01-19 RX ORDER — HUMAN INSULIN 100 [IU]/ML
INJECTION, SUSPENSION SUBCUTANEOUS
Qty: 50 ML | Refills: 3 | Status: SHIPPED | OUTPATIENT
Start: 2024-01-19

## 2024-01-22 ENCOUNTER — TELEPHONE (OUTPATIENT)
Dept: HEMATOLOGY ONCOLOGY | Facility: CLINIC | Age: 64
End: 2024-01-22

## 2024-01-22 NOTE — TELEPHONE ENCOUNTER
I called Jerald in response to a referral that was received for patient to establish care with Hematology.     Outreach was made to schedule a consultation.    Patient decline to schedule at this time will think about it and call back when he is ready to schedule.  The referral has been closed.

## 2024-01-22 NOTE — ASSESSMENT & PLAN NOTE
Lab Results   Component Value Date    EGFR 53 01/12/2024    EGFR 48 11/07/2023    EGFR 40 09/15/2023    CREATININE 1.40 (H) 01/12/2024    CREATININE 1.53 (H) 11/07/2023    CREATININE 1.75 (H) 09/15/2023   GFR stable.  Continue current.

## 2024-01-22 NOTE — ASSESSMENT & PLAN NOTE
Lab Results   Component Value Date    HGBA1C 10.7 (H) 01/12/2024   A1c not at goal.  Encouraged diet and exercise.

## 2024-01-22 NOTE — PROGRESS NOTES
Assessment/Plan:    Diabetic polyneuropathy associated with type 2 diabetes mellitus (HCC)    Lab Results   Component Value Date    HGBA1C 10.7 (H) 01/12/2024   A1c not at goal.  Encouraged diet and exercise.    Primary hypertension  BP at goal.  Continue current.    Stage 3a chronic kidney disease (HCC)  Lab Results   Component Value Date    EGFR 53 01/12/2024    EGFR 48 11/07/2023    EGFR 40 09/15/2023    CREATININE 1.40 (H) 01/12/2024    CREATININE 1.53 (H) 11/07/2023    CREATININE 1.75 (H) 09/15/2023   GFR stable.  Continue current.    Hyperlipidemia  LDL at goal.  Continue current.       Diagnoses and all orders for this visit:    Type II diabetes mellitus with peripheral circulatory disorder (HCC)  -     Albumin / creatinine urine ratio; Future  -     Ambulatory Referral to Ophthalmology; Future    Obesity, morbid (HCC)    Chronic kidney disease, stage 3a (HCC)    Monoclonal gammopathies  -     Ambulatory Referral to Hematology / Oncology; Future    Type 2 diabetes mellitus with complication (HCC)  -     NovoLIN 70/30 ReliOn (70-30) 100 UNIT/ML subcutaneous injection; INJECT 75  UNITS SUBCUTANEOUSLY TWICE DAILY    Primary hypertension    Stage 3a chronic kidney disease (HCC)    Diabetic polyneuropathy associated with type 2 diabetes mellitus (HCC)    Mixed hyperlipidemia    Other orders  -     b complex vitamins capsule; Take 1 capsule by mouth daily          Subjective:      Patient ID: Jerald Calvin is a 63 y.o. male.    HPI    The following portions of the patient's history were reviewed and updated as appropriate: He  has a past medical history of Diabetic foot ulcer with osteomyelitis ([facility]) (9/28/2018).  He   Patient Active Problem List    Diagnosis Date Noted    Hypogonadism in male 08/24/2022    Heart murmur 08/12/2022    Type II diabetes mellitus with peripheral circulatory disorder (HCC)     Stage 3a chronic kidney disease (HCC) 11/19/2021    Obesity, morbid (HCC) 08/06/2021    Medicare annual  "wellness visit, initial 07/31/2019    Diabetic polyneuropathy associated with type 2 diabetes mellitus (HCC) 03/29/2019    Hyperlipidemia 09/28/2018    Primary hypertension 09/28/2018     He  has a past surgical history that includes Appendectomy; Back surgery; Elbow surgery; Tonsillectomy; and Wrist surgery.  His family history includes Diabetes in his father and mother; Heart disease in his mother; Stroke in his brother and sister.  He  reports that he has quit smoking. His smoking use included cigarettes. He has never used smokeless tobacco. He reports that he does not currently use alcohol. He reports that he does not use drugs.  Current Outpatient Medications   Medication Sig Dispense Refill    ascorbic acid (VITAMIN C) 1000 MG tablet Take 2,000 mg by mouth      atorvastatin (LIPITOR) 40 mg tablet take 1 tablet by mouth once daily 90 tablet 3    b complex vitamins capsule Take 1 capsule by mouth daily      B-D SYRINGE/NEEDLE 3CC/22GX1.5 22G X 1-1/2\" 3 ML MISC use as directed 100 each 3    Blood Glucose Monitoring Suppl (OneTouch Verio Flex System) w/Device KIT USE AS DIRECTED BY MD TESTING FOUR TIMES DAILY      Cholecalciferol 25 MCG (1000 UT) capsule Take 1,000 Units by mouth      dapagliflozin (Farxiga) 10 MG tablet Take 1 tablet (10 mg total) by mouth daily 90 tablet 3    diphenhydrAMINE-acetaminophen (TYLENOL PM)  MG TABS Take 2 tablets by mouth daily at bedtime as needed for sleep      Dodex 1000 MCG/ML inject 1 milliliter ( 1000 MCG ) intramuscularly Every Month 10 mL 5    gabapentin (NEURONTIN) 300 mg capsule TAKE 2 CAPSULES BY MOUTH THREE TIMES DAILY 540 capsule 0    glucose blood (OneTouch Verio) test strip CHECK BLOOD SUGAR FOUR TIMES DAILY 400 each 0    ibuprofen (MOTRIN) 800 mg tablet Take 1 tablet (800 mg total) by mouth every 6 (six) hours as needed for mild pain 360 tablet 3    lisinopril (ZESTRIL) 10 mg tablet Take 1 tablet (10 mg total) by mouth daily 90 tablet 3    NovoLIN 70/30 ReliOn " "(70-30) 100 UNIT/ML subcutaneous injection INJECT 75  UNITS SUBCUTANEOUSLY TWICE DAILY 50 mL 3    OneTouch Delica Lancets 33G MISC Check blood sugars four times daily. Please substitute with appropriate alternative as covered by patient's insurance. Dx: E11.65 400 each 3    vitamin E, tocopherol, 1,000 units capsule Take 1,000 Units by mouth daily       No current facility-administered medications for this visit.     Current Outpatient Medications on File Prior to Visit   Medication Sig    ascorbic acid (VITAMIN C) 1000 MG tablet Take 2,000 mg by mouth    atorvastatin (LIPITOR) 40 mg tablet take 1 tablet by mouth once daily    b complex vitamins capsule Take 1 capsule by mouth daily    B-D SYRINGE/NEEDLE 3CC/22GX1.5 22G X 1-1/2\" 3 ML MISC use as directed    Blood Glucose Monitoring Suppl (OneTouch Verio Flex System) w/Device KIT USE AS DIRECTED BY MD TESTING FOUR TIMES DAILY    Cholecalciferol 25 MCG (1000 UT) capsule Take 1,000 Units by mouth    dapagliflozin (Farxiga) 10 MG tablet Take 1 tablet (10 mg total) by mouth daily    diphenhydrAMINE-acetaminophen (TYLENOL PM)  MG TABS Take 2 tablets by mouth daily at bedtime as needed for sleep    Dodex 1000 MCG/ML inject 1 milliliter ( 1000 MCG ) intramuscularly Every Month    gabapentin (NEURONTIN) 300 mg capsule TAKE 2 CAPSULES BY MOUTH THREE TIMES DAILY    glucose blood (OneTouch Verio) test strip CHECK BLOOD SUGAR FOUR TIMES DAILY    ibuprofen (MOTRIN) 800 mg tablet Take 1 tablet (800 mg total) by mouth every 6 (six) hours as needed for mild pain    lisinopril (ZESTRIL) 10 mg tablet Take 1 tablet (10 mg total) by mouth daily    OneTouch Delica Lancets 33G MISC Check blood sugars four times daily. Please substitute with appropriate alternative as covered by patient's insurance. Dx: E11.65    vitamin E, tocopherol, 1,000 units capsule Take 1,000 Units by mouth daily     No current facility-administered medications on file prior to visit.     He has No Known " "Allergies..    Review of Systems   All other systems reviewed and are negative.        Objective:      /78 (BP Location: Left arm, Patient Position: Sitting)   Pulse 82   Temp 97.7 °F (36.5 °C) (Tympanic)   Ht 5' 11\" (1.803 m)   Wt 117 kg (258 lb 9.6 oz)   SpO2 92%   BMI 36.07 kg/m²          Physical Exam  Vitals and nursing note reviewed.   Constitutional:       Appearance: Normal appearance. He is obese.   Cardiovascular:      Rate and Rhythm: Normal rate and regular rhythm.      Pulses: Normal pulses.      Heart sounds: Normal heart sounds.   Pulmonary:      Effort: Pulmonary effort is normal.      Breath sounds: Normal breath sounds.   Abdominal:      General: Abdomen is flat. Bowel sounds are normal.      Palpations: Abdomen is soft.   Musculoskeletal:         General: Normal range of motion.      Cervical back: Normal range of motion and neck supple.   Skin:     General: Skin is warm and dry.      Capillary Refill: Capillary refill takes less than 2 seconds.   Neurological:      General: No focal deficit present.      Mental Status: He is alert and oriented to person, place, and time. Mental status is at baseline.   Psychiatric:         Mood and Affect: Mood normal.         Behavior: Behavior normal.         Thought Content: Thought content normal.         Judgment: Judgment normal.           "

## 2024-01-24 ENCOUNTER — CLINICAL SUPPORT (OUTPATIENT)
Dept: FAMILY MEDICINE CLINIC | Facility: CLINIC | Age: 64
End: 2024-01-24

## 2024-01-24 DIAGNOSIS — E11.51 TYPE II DIABETES MELLITUS WITH PERIPHERAL CIRCULATORY DISORDER (HCC): Primary | ICD-10-CM

## 2024-01-24 NOTE — PROGRESS NOTES
[facility]  Sue Mclaughlin, Pharmacist    Jerald Calvin is a 63 y.o. male who was referred to the pharmacist for DM education and management, referred by Conor Coker MD .    Telemedicine consent  The patient was identified by name and date of birth. Jerald Calvin was informed that this is a telemedicine visit and that the visit is being conducted through Telephone.  My office door was closed. No one else was in the room.  He acknowledged consent and understanding of privacy and security of the video platform. The patient has agreed to participate and understands they can discontinue the visit at any time.    Assessment/ Plan     Type 2 Diabetes:  Goal A1c <7% based on ADA guidelines. Most recent A1c above goal  Lab Results   Component Value Date    HGBA1C 10.7 (H) 01/12/2024     Reported Home SMBG  Morning average remains elevated in the 300- 400 mg/dL range  Before lunch, before dinner, and post dinner readings are controlled.   Elevations occur later in evening because patient is eating a sandwich but does not inject insulin with it   Complications:  Microvascular complications: neuropathy, CKD   Macrovascular complications: peripheral circulatory disorder  Medication options  Hx CKD. On Farxiga which is indicated for renal benefits.   Future consideration-   He would also benefit from GLP-1 agonist as well for additional weight loss/ A1c lowering. He would need to obtain this through patient assistance program and unfortunately [facility] program is no longer accepting new applicants at this time due to drug shortages.   Medication Cost  Approved for [employer]'s patient assistance program through 2023 calendar year    Changes to Medication Regimen:   If PCP is in agreement  Currently using insulin 1 x daily. Injects Novolin 70/30 total of 150 units with dinner. Discussed splitting up dose between dinner and evening sandwich. He does not eat breakfast. Smaller  portion with lunch. Dinner and evening sandwich are his largest meals     2. On Additional Therapies:  Statin: yes    ASA: yes   Neuropathy: Gabapentin 300 mg - 2 caps TID     Monitoring: testing 4x daily    Follow-up: 2 weeks    Subjective     Medication Adherence/ Tolerability:  Novolin 70/30 mix vial- has been injecting one time daily 150 units with dinner. Does not inject with lunch because readings are low and his lunch is usually smaller portion. Has a large dinner and sandwich every night  Dapagliflozin 10 mg daily    Medications Tried in Past:  Janumet- cost constraints   Metformin- d/c due to NEREYDA    2. Lifestyle:   Eating more salads. Most days having 2 meals and bedtime snack  Morning: drinking coffee with splenda and milk  12 PM Lunch- usually a salad but sometimes will have sandwich and iced tea or lemonade (sugar free)   5 PM Dinner - Usually pork chops with potatoes    Snack at 12 PM- bologna sandwich      3. Home monitoring devices  Glucometer: Yes,   CGM: No   BP monitor: unknown     Objective     Lab Results   Component Value Date    HGBA1C 10.7 (H) 01/12/2024    HGBA1C 9.5 (H) 03/21/2023    HGBA1C 9.5 (H) 03/06/2023       Blood Glucose Readings  The patient is currently checking blood glucose 4 times per day. Patient reports with SMBG logs.    Date AM (5:30 AM)  Lunch  (12 PM) Dinner  (5:30 PM) Post-  Dinner  (~7 PM)   1/1/24 312 80 117 109    496 225 72 112    342 176 148 111    364 234 113 117    375 136 125 118    426 117 194 131    359 136 134 126    391 121 77 107    380 368 125 113    382 87 179 129    334 108 145 103    397 197 172 131    343 127 152 139    395 105 521 210    100 300 82 112    326 118 142 123    279 119 134 101    349 135 114 109    326 80 157 129    363 68 178 111    253 80 156 112    354 108 153 107    290 120 187 127   1/24/23 321 71     Avg 344 142 155 121       ASCVD Risk:  The ASCVD Risk score (Perla OATES, et al., 2019) failed to calculate for the following reasons:     The valid total cholesterol range is 130 to 320 mg/dL     Vitals:  There were no vitals filed for this visit.    Labs:    Lab Results   Component Value Date    SODIUM 139 01/12/2024    K 4.7 01/12/2024    EGFR 53 01/12/2024    CREATININE 1.40 (H) 01/12/2024    GLUF 165 (H) 01/12/2024    [id number] 611 03/21/2023    MICROALBCRE 427 (H) 03/21/2023         Pharmacist Tracking Tool     Pharmacist Tracking Tool  Reason For Outreach: Embedded Pharmacist  Demographics:  Intervention Method: Phone  Type of Intervention: Follow-Up  Topics Addressed: Diabetes  Pharmacologic Interventions: Dose or Frequency Adjusted  Non-Pharmacologic Interventions: Disease state education, Home Monitoring and Medication/Device education  Time:  Direct Patient Care:  25  mins  Care Coordination:  15  mins  Recommendation Recipient: N/A  Outcome: N/A

## 2024-01-25 ENCOUNTER — VBI (OUTPATIENT)
Dept: ADMINISTRATIVE | Facility: OTHER | Age: 64
End: 2024-01-25

## 2024-02-01 DIAGNOSIS — E11.8 TYPE 2 DIABETES MELLITUS WITH COMPLICATION (HCC): ICD-10-CM

## 2024-02-01 RX ORDER — GABAPENTIN 300 MG/1
CAPSULE ORAL
Qty: 540 CAPSULE | Refills: 0 | Status: SHIPPED | OUTPATIENT
Start: 2024-02-01

## 2024-02-07 ENCOUNTER — CLINICAL SUPPORT (OUTPATIENT)
Dept: FAMILY MEDICINE CLINIC | Facility: CLINIC | Age: 64
End: 2024-02-07

## 2024-02-07 DIAGNOSIS — E11.8 TYPE 2 DIABETES MELLITUS WITH COMPLICATION (HCC): Primary | ICD-10-CM

## 2024-02-07 NOTE — PROGRESS NOTES
[facility]  Sue Mclaughlin, Pharmacist    Jerald Calvin is a 63 y.o. male who was referred to the pharmacist for DM education and management, referred by Conor Coker MD .    Telemedicine consent  The patient was identified by name and date of birth. Jerald Calvin was informed that this is a telemedicine visit and that the visit is being conducted through Telephone.  My office door was closed. No one else was in the room.  He acknowledged consent and understanding of privacy and security of the video platform. The patient has agreed to participate and understands they can discontinue the visit at any time.    Assessment/ Plan     Type 2 Diabetes:  Goal A1c <7% based on ADA guidelines. Most recent A1c above goal  Lab Results   Component Value Date    HGBA1C 10.7 (H) 01/12/2024     Reported Home SMBG  Morning readings now average 233 mg/dL which is an improvement from 344 mg/dL  Before lunch, before dinner, and post dinner readings are controlled.   Complications:  Microvascular complications: neuropathy, CKD   Macrovascular complications: peripheral circulatory disorder  Medication options  Hx CKD. On Farxiga which is indicated for renal benefits.   Future consideration-   He would also benefit from GLP-1 agonist as well for additional weight loss/ A1c lowering. He would need to obtain this through patient assistance program and unfortunately [facility] program is no longer accepting new applicants at this time due to drug shortages.   Medication Cost  Approved for [employer]'s patient assistance program through 2024 calendar year    Changes to Medication Regimen:   If PCP is in agreement  Blood sugar readings are improving. Increase evening Novolin 70/30 dose to 60 units.   Continue current morning dose.     2. On Additional Therapies:  Statin: yes    ASA: yes   Neuropathy: Gabapentin 300 mg - 2 caps TID     Monitoring: testing 4x daily    Follow-up: 3  weeks    Subjective     Medication Adherence/ Tolerability:  Novolin 70/30 mix vial-   Morning dose  If BG >200- Injects 100 units  If BG < 200-  Injects 50 units   Evening dose  Injects 50 units daily  Dapagliflozin 10 mg daily    Medications Tried in Past:  Janumet- cost constraints   Metformin- d/c due to NEREYDA    2. Lifestyle:   Eating more salads. Most days having 2 meals and bedtime snack  Morning: drinking coffee with splenda and milk  12 PM Lunch- usually a salad but sometimes will have sandwich and iced tea or lemonade (sugar free)   5 PM Dinner - Usually pork chops with potatoes    Snack at 12 PM- bologna sandwich      3. Home monitoring devices  Glucometer: Yes,   CGM: No   BP monitor: unknown     Objective     Lab Results   Component Value Date    HGBA1C 10.7 (H) 01/12/2024    HGBA1C 9.5 (H) 03/21/2023    HGBA1C 9.5 (H) 03/06/2023       Blood Glucose Readings  The patient is currently checking blood glucose 4 times per day. Patient reports with SMBG logs.    Date AM (5:30 AM)  Lunch  (12 PM) Dinner  (5:30 PM) Post-  Dinner  (~7 PM)   1/25 248 127 96 101    297 147 100 112    183 288 122 101    220 52 127 112    206 95 281 139    105 308 113 101    262 71 137 106    225 106 196 127    223 117 98 106    249 133 119 111    265 95 186 131    253 116 149 113    336 171 114 109   2/7 191 232     Avg 233 147 141 113       ASCVD Risk:  The ASCVD Risk score (Perla DK, et al., 2019) failed to calculate for the following reasons:    The valid total cholesterol range is 130 to 320 mg/dL     Vitals:  There were no vitals filed for this visit.    Labs:    Lab Results   Component Value Date    SODIUM 139 01/12/2024    K 4.7 01/12/2024    EGFR 53 01/12/2024    CREATININE 1.40 (H) 01/12/2024    GLUF 165 (H) 01/12/2024    [id number] 611 03/21/2023    MICROALBCRE 427 (H) 03/21/2023         Pharmacist Tracking Tool     Pharmacist Tracking Tool  Reason For Outreach: Embedded Pharmacist  Demographics:  Intervention Method:  Phone  Type of Intervention: Follow-Up  Topics Addressed: Diabetes  Pharmacologic Interventions: Dose or Frequency Adjusted  Non-Pharmacologic Interventions: Disease state education, Home Monitoring and Medication/Device education  Time:  Direct Patient Care:  25  mins  Care Coordination:  15  mins  Recommendation Recipient: N/A  Outcome: N/A

## 2024-02-19 NOTE — PATIENT INSTRUCTIONS
Control de la diabetes tipo 2 en los adultos   CUIDADO AMBULATORIO:   La diabetes tipo 2 es erin enfermedad que afecta la forma en que el cuerpo utiliza la glucosa (azúcar). El cuerpo no puede producir suficiente insulina o es incapaz de usarla adecuadamente. Es importante controlar la diabetes para evitar el daño al corazón, los vasos sanguíneos y otros órganos. El control lo ayudará a sentirse dre y a disfrutar de mary actividades diarias. Mary médicos del equipo de cuidado de la diabetes pueden ayudarlo a hacer un plan para que el cuidado de la diabetes encaje en kimble horario. Kimble plan puede cambiar con el tiempo para adaptarse a mary necesidades y a las de kimble bertha.       Pídale a alguien que llame al número de emergencias local (911 en [place]) si:  No es posible despertarlo.    Tiene signos de cetoacidosis diabética:     confusión, fatiga    vómitos    latidos cardíacos rápidos    aliento con olor a frutas    sed extrema    sequedad en la boca y la piel    Tiene alguno de los siguientes signos de un ataque cardíaco:      Estrujamiento, presión o tensión en kimble pecho    Usted también podría presentar alguno de los siguientes:     Malestar o dolor en kimble espalda, bryson, mandíbula, abdomen, o brazo    Falta de aliento    Náuseas o vómitos    Desvanecimiento o sudor frío repentino    Usted tiene alguno de los siguientes signos de derrame cerebral:      Adormecimiento o caída de un lado de kimble xin    Debilidad en un brazo o erin pierna    Confusión o debilidad para hablar    Mareos o dolor de humberto intenso, o pérdida de la visión.    Llame a kimble médico o al equipo de cuidado de la diabetes si:  Tiene erin llaga o erin herida que no cicatrizan.    Tiene un cambio en la cantidad de orina.    Mary niveles de azúcar en la anabel son superiores a las metas fijadas.    Usted a menudo tiene niveles de azúcar en la anabel más bajos que mary metas fijadas.    Kimble piel está enrojecida, seca, caliente al tacto o  inflamada.    Usted tiene problemas para sobrellevar kimble diabetes o se siente ansioso o deprimido.    Tiene problemas para seguir alguna parte de kimble plan de atención, nayeli el plan de comidas.    Usted tiene preguntas o inquietudes acerca de kimble condición o cuidado.    Lo que usted necesita saber sobre los niveles altos de azúcar en la anabel: El azúcar alto en la anabel puede no causar ningún síntoma. Puede sentir más sed u orinar con más frecuencia de lo habitual. Con el tiempo, los niveles altos de azúcar en la anabel pueden dañar kristen nervios, vasos sanguíneos, tejidos y órganos. Lo siguiente aumenta los niveles de azúcar en la anabel:  Comidas copiosas o grandes cantidades de carbohidratos de erin tomasa vez    Menos actividad física    Estrés    Enfermedad    Erin dosis shauna de medicamento para la diabetes o insulina, o erin dosis tardía    Lo que usted necesita saber sobre los niveles bajos de azúcar en la anabel: Los síntomas incluyen sensación de temblores, mareos, irritabilidad o confusión. Puedes prevenir los síntomas evitando que los niveles de azúcar en anabel bajen demasiado.  Trate los niveles bajos de azúcar en la anabel inmediatamente:     Booker 4 onzas de jugo o 1 tubo de gel de glucosa.    Revise nuevamente kimble nivel de azúcar en la anabel en 10 a 15 minutos.    Cuando el nivel regrese a la normalidad, coma un alimento o refrigerio para prevenir otro bajón.       Lleve siempre consigo gel de glucosa, uvas pasas o caramelos duros para tratar los niveles bajos de azúcar en la anabel.     Kimble azúcar en la anabel puede bajar demasiado si katt un medicamento para la diabetes o la insulina y no consume suficientes alimentos.     Si usa insulina, verifique kimble nivel de azúcar en la anabel antes de hacer ejercicio.     Si kimble nivel de azúcar en la anabel es inferior a 100 mg/dL, coma 4 galletas, 2 onzas de uvas pasas o booker 4 onzas de jugo.    Compruebe kimble nivel cada 30 minutos si hace ejercicio nilam más de 1  hora.    Puede que necesite erin merienda nilam o después de hacer ejercicio.    Qué puede hacer para manejar kristen niveles de azúcar en la anabel:  Revise kristen niveles de azúcar en la anabel según las indicaciones y según sea necesario. Hay varios elementos disponibles que puede utilizar para comprobar kristen niveles. Puede que tenga que comprobarlo probando erin gota de anabel en un medidor de glucosa. En kimble lugar, es posible que le den un monitor continuo de glucosa (MCG). El dispositivo se lleva puesto en todo momento. El MCG revisa kimble nivel de azúcar en la anabel cada 5 minutos. Envía los resultados a un dispositivo electrónico, nayeli un teléfono inteligente. Un MCG puede utilizarse con o sin erin bomba de insulina. Usted y los médicos de kimble equipo de atención de la diabetes decidirán cuál es el mejor método para usted. El objetivo es lograr que los niveles de azúcar en anabel antes de las comidas estén  entre 80 y 130 mg/dL y 2 horas después de comer  pat inferiores a 180 mg/dL.            Elija opciones de alimentos saludables. Consulte con kimble dietista para crear un plan de comidas que funcione para usted y kristen horarios. Un dietista puede ayudarlo para que aprenda cómo alimentarse dre con la cantidad adecuada de carbohidratos (azúcar y los alimentos que contienen almidón) nilam las comidas y meriendas. Algunos ejemplos de carbohidratos son el pan, los cereales, el arroz, la pasta, la fruta, los lácteos bajos en grasa y los dulces. Los carbohidratos pueden subir kimble azúcar en la anabel si usted come demasiado a la vez.         Coma alimentos altos en fibras, según las indicaciones. La fibra ayuda a mejorar los niveles de azúcar en la anabel. La fibra también reduce el riesgo de padecer enfermedades cardíacas y otros problemas que puede causar la diabetes. Por ejemplo, los alimentos ricos en fibra verduras, pan integral y frijoles, nayeli los frijoles pintos. Kimble dietista puede indicarle cuánta fibra debe consumir cada  día.         Realice actividad física regularmente. La actividad física puede ayudarlo a alcanzar kimble objetivo de nivel de azúcar en anabel y a controlar kimble peso. Gwen al menos 150 minutos de actividad física aeróbica de moderada a vigorosa cada semana. El entrenamiento de resistencia, nayeli el levantamiento de pesas, debe realizarse 3 veces por semana. No deje de realizarla nilam más de 2 días seguidos. No permanezca sentada por más de 30 minutos cada vez. Kimble médico puede ayudarle a crear un plan de actividades. El plan puede incluir las mejores actividades para usted y puede ayudarlo a desarrollar kimble fuerza y resistencia.            Mantenga un peso saludable. Pregúntele a kimble equipo cuál es el peso ideal para usted. El peso saludable puede ayudarle a controlar kimble diabetes y a evitar erin enfermedad cardíaca. Pídale a kimble equipo que lo ayude a elaborar un plan para perder peso, si lo necesita. Incluso reducir del 3% al 7% de kimble peso corporal puede ayudarlo a hacer erin diferencia en el control de kimble diabetes. Kimble equipo establecerá erin meta de pérdida de peso, de entre 10 a 15 libras o de un 5% de kimble sobrepeso. Juntos, usted y kimble equipo, podrán fijar metas de pérdida de peso alcanzables.    Tómese kimble medicamento para la diabetes o la insulina según las indicaciones. Es posible que necesite medicamento para la diabetes, insulina o ambos para controlar kristen niveles de glucosa en anabel. Kimble médico le indicará cómo y cuándo se debe mary kimble medicamento de diabetes o la insulina. También se le enseñarán los efectos secundarios que pueden causar los medicamentos orales para la diabetes. La insulina puede administrarse mediante inyección o erin bomba o erin pluma. Usted y los médicos decidirán cuál es el mejor método para usted:    La bomba de insulina es un dispositivo implantado que le da insulina las 24 horas del día. Erin bomba de insulina previene la necesidad de inyecciones múltiples de insulina en un día.         La pluma  para insulina es un dispositivo precargado con la cantidad adecuada de insulina.         A usted y kimble bertha les enseñarán cómo preparar y administrar la insulina si tomasa es el mejor método para usted. Mary médicos también le enseñarán cómo desechar las agujas y jeringas.    Aprenderá la cantidad de insulina que necesita y cuándo administrarla. Se le enseñará cuándo no administrar la insulina. También se le enseñará lo que debe hacer si kimble nivel de azúcar en la anabel baja demasiado. [place] puede suceder si usted katt insulina y no come la cantidad adecuada de carbohidratos.    Más maneras para controlar la diabetes tipo 2:  Use identificación de alerta médica. Use un brazalete o collar de alerta médica o lleve consigo erin tarjeta que indique que tiene diabetes. Pregunte a kimble médico dónde puede conseguir esos artículos.         No fume. La nicotina y otras sustancias químicas de los cigarrillos y los cigarros pueden dañar el pulmón y los vasos sanguíneos. También dificulta el control de la diabetes. Pida información a kimble médico si usted actualmente fuma y necesita ayuda para dejar de fumar. No use cigarrillos electrónicos o tabaco sin humo en vez de cigarrillos o para tratar de dejar de fumar. Todos estos aún contienen nicotina.    Revise mary pies todos los días por cortadas, raspones, callos u otras heridas. Tomasa pendiente de enrojecimiento e inflamación y de calor al tacto. Use zapatos que le calcen dre. Compruebe que no haya piedras u otros objetos dentro de mary zapatos que le podrían lastimar los pies. No camine descalzo ni use zapatos sin calcetines. Use calcetines de algodón para ayudar a mantener secos los pies.         Pregunte sobre las vacunas que pudiera necesitar. Usted corre un mayor riesgo de presentar enfermedades graves si se contagia gripe, neumonía, COVID-19 o hepatitis. Pregunte a kimble médico si debe vacunarse para prevenir estas u otras enfermedades, y cuándo debe hacerlo.    Hable con kimble médico si  se siente estresado por el cuidado de la diabetes. A veces, encajar el cuidado de la diabetes en kimble gretchen puede provocar un aumento del estrés. El estrés puede hacer que no se cuide adecuadamente. Kimble médico puede ayudarlo con consejos sobre el autocuidado. El profesional de la daljit mental puede escuchar y ofrecer ayuda en cuestiones de cuidado personal. Otros tipos de terapia pueden ayudarlo a realizar cambios nutricionales o en kimble actividad física.    Hágase un control de la A1c según las indicaciones. Kimble médico puede comprobar kimble A1c cada 3 meses, o 2 veces al año si kimble diabetes está controlada. El examen de A1c muestra la cantidad promedio de azúcar en kimble anabel nilam los últimos 2 a 3 meses. Kimble médico le dirá cuál debe ser kimble nivel de A1c.    Hágase las pruebas de detección nayeli se le indique. Kimble médico podría recomendarle que se gwen pruebas para detectar complicaciones de la diabetes y otras condiciones que puedan desarrollarse. Algunas pruebas de detección pueden comenzar inmediatamente y otros pueden ocurrir dentro de los primeros 5 años del diagnóstico:    Los ejemplos de complicaciones de la diabetes incluyen problemas renales, colesterol alto, presión arterial jerri, problemas vasculares, problemas oculares y apnea del sueño.    Se le puede hacer erin prueba para detectar niveles bajos de vitamina B si katt medicamentos orales para la diabetes nilam mucho tiempo.    Pueden hacerle pruebas para detectar el síndrome de ovario poliquístico (SOPQ) si tiene edad fecunda.    Gwen un seguimiento con kimble médico o con los proveedores del equipo de cuidado de la diabetes según las instrucciones: Es posible que a usted le debbie análisis de anabel antes de la dory de control. Los resultados de las pruebas mostrarán si es necesario hacer cambios en el tratamiento o en los cuidados personales. Hable con kimble médico si no puede pagar kristen medicamentos. Anote kristen preguntas para que se acuerde de hacerlas nilam kristen  visitas.  © Copyright [employer] 2023 Information is for End User's use only and may not be sold, redistributed or otherwise used for commercial purposes.  Esta información es sólo para uso en educación. Kimble intención no es darle un consejo médico sobre enfermedades o tratamientos. Colsulte con kimble médico, enfermera o farmacéutico antes de seguir cualquier régimen médico para saber si es seguro y efectivo para usted.

## 2024-02-21 PROBLEM — Z00.00 MEDICARE ANNUAL WELLNESS VISIT, INITIAL: Status: RESOLVED | Noted: 2019-07-31 | Resolved: 2024-02-21

## 2024-02-27 ENCOUNTER — CLINICAL SUPPORT (OUTPATIENT)
Dept: FAMILY MEDICINE CLINIC | Facility: CLINIC | Age: 64
End: 2024-02-27

## 2024-02-27 DIAGNOSIS — E11.8 TYPE 2 DIABETES MELLITUS WITH COMPLICATION (HCC): Primary | ICD-10-CM

## 2024-02-27 NOTE — PROGRESS NOTES
[facility]  Sue Mclaughlin, Pharmacist    Jerald Calvin is a 63 y.o. male who was referred to the pharmacist for DM education and management, referred by Conor Coker MD .    Telemedicine consent  The patient was identified by name and date of birth. Jerald Calvin was informed that this is a telemedicine visit and that the visit is being conducted through Telephone.  My office door was closed. No one else was in the room.  He acknowledged consent and understanding of privacy and security of the video platform. The patient has agreed to participate and understands they can discontinue the visit at any time.    Assessment/ Plan     Type 2 Diabetes:  Goal A1c <7% based on ADA guidelines. Most recent A1c above goal  Lab Results   Component Value Date    HGBA1C 10.7 (H) 01/12/2024     Reported Home SMBG  Morning readings now average 192 mg/dL which is an improvement from 233 mg/dL  Before lunch, before dinner, and post dinner readings are controlled.   Complications:  Microvascular complications: neuropathy, CKD   Macrovascular complications: peripheral circulatory disorder  Medication options  Hx CKD. On Farxiga which is indicated for renal benefits.   Future consideration-   He would also benefit from GLP-1 agonist as well for additional weight loss/ A1c lowering. He would need to obtain this through patient assistance program and unfortunately [employer] program is no longer accepting new applicants at this time due to drug shortages.   Medication Cost  Approved for [employer]'s patient assistance program through 2024 calendar year    Changes to Medication Regimen:   If PCP is in agreement  Blood sugar readings are improving. Continue current insulin doses.     2. On Additional Therapies:  Statin: yes    ASA: yes   Neuropathy: Gabapentin 300 mg - 2 caps TID     Monitoring: testing 4x daily    Follow-up: 4 weeks    Subjective     Medication Adherence/ Tolerability:  Novolin  70/30 mix vial-   Morning dose  If BG >200- Injects 100 units  If BG < 200-  Injects 50 units   Evening dose  Injects 60 units daily  Dapagliflozin 10 mg daily    Medications Tried in Past:  Janumet- cost constraints   Metformin- d/c due to NEREDYA    2. Lifestyle:   Eating more salads. Most days having 2 meals and bedtime snack  Morning: drinking coffee with splenda and milk  12 PM Lunch- usually a salad but sometimes will have sandwich and iced tea or lemonade (sugar free)   5 PM Dinner - Usually pork chops with potatoes    Snack at 12 PM- bologna sandwich      3. Home monitoring devices  Glucometer: Yes,   CGM: No   BP monitor: unknown     Objective     Lab Results   Component Value Date    HGBA1C 10.7 (H) 01/12/2024    HGBA1C 9.5 (H) 03/21/2023    HGBA1C 9.5 (H) 03/06/2023       Blood Glucose Readings  The patient is currently checking blood glucose 4 times per day. Patient reports with SMBG logs.    Date AM (5:30 AM)  Lunch  (12 PM) Dinner  (5:30 PM) Post-  Dinner  (~7 PM)   2/8 123 201 149 110    97 186 169 123    218 127 145 110    217 117 163 112    195 130 177 123    176 205 131 109    95 123 90 110    137 136 124 102    207 72 161 121    227 79 142 119    265 141 118 109    332 181 106 110    203 85 111 109    218 91 138 117    207 198 221 110    210 80 161 121    215 295 145 119    177 124 212 117   2/27 142 235     Avg 192 144 146 114       ASCVD Risk:  The ASCVD Risk score (House DK, et al., 2019) failed to calculate for the following reasons:    The valid total cholesterol range is 130 to 320 mg/dL     Vitals:  There were no vitals filed for this visit.    Labs:    Lab Results   Component Value Date    SODIUM 139 01/12/2024    K 4.7 01/12/2024    EGFR 53 01/12/2024    CREATININE 1.40 (H) 01/12/2024    GLUF 165 (H) 01/12/2024    [id number] 611 03/21/2023    MICROALBCRE 427 (H) 03/21/2023         Pharmacist Tracking Tool     Pharmacist Tracking Tool  Reason For Outreach: Embedded  Pharmacist  Demographics:  Intervention Method: Phone  Type of Intervention: Follow-Up  Topics Addressed: Diabetes  Pharmacologic Interventions: Dose or Frequency Adjusted  Non-Pharmacologic Interventions: Disease state education, Home Monitoring and Medication/Device education  Time:  Direct Patient Care:  25  mins  Care Coordination:  15  mins  Recommendation Recipient: N/A  Outcome: N/A

## 2024-03-02 DIAGNOSIS — E11.8 TYPE 2 DIABETES MELLITUS WITH COMPLICATION (HCC): ICD-10-CM

## 2024-03-02 DIAGNOSIS — N18.31 STAGE 3A CHRONIC KIDNEY DISEASE (HCC): ICD-10-CM

## 2024-03-02 DIAGNOSIS — I10 PRIMARY HYPERTENSION: ICD-10-CM

## 2024-03-04 RX ORDER — LISINOPRIL 10 MG/1
10 TABLET ORAL DAILY
Qty: 90 TABLET | Refills: 1 | Status: SHIPPED | OUTPATIENT
Start: 2024-03-04

## 2024-03-07 DIAGNOSIS — E11.8 TYPE 2 DIABETES MELLITUS WITH COMPLICATION (HCC): ICD-10-CM

## 2024-03-07 RX ORDER — BLOOD SUGAR DIAGNOSTIC
STRIP MISCELLANEOUS
Qty: 400 EACH | Refills: 1 | Status: SHIPPED | OUTPATIENT
Start: 2024-03-07

## 2024-03-07 NOTE — TELEPHONE ENCOUNTER
Reason for call:   [x] Refill   [] Prior Auth  [] Other:     Office:   [x] PCP/Provider - [place]  [] Specialty/Provider -     Medication: glucose blood (OneTouch Verio) test strip     Dose/Frequency: CHECK BLOOD SUGAR FOUR TIMES DAILY     Quantity: 400 +1    Pharmacy: [id number] [address]     Does the patient have enough for 3 days?   [x] Yes   [] No - Send as HP to POD

## 2024-03-12 DIAGNOSIS — E11.8 TYPE 2 DIABETES MELLITUS WITH COMPLICATION (HCC): ICD-10-CM

## 2024-03-12 RX ORDER — GABAPENTIN 300 MG/1
600 CAPSULE ORAL 3 TIMES DAILY
Qty: 540 CAPSULE | Refills: 1 | Status: SHIPPED | OUTPATIENT
Start: 2024-03-12

## 2024-03-12 NOTE — TELEPHONE ENCOUNTER
Reason for call:   [x] Refill   [] Prior Auth  [] Other:     Office:   [x] PCP/Provider -   [] Specialty/Provider -     Medication: gabapentin 300 mg     Dose/Frequency: TAKE 2 CAPSULES BY MOUTH THREE TIMES DAILY     Quantity: 540    Pharmacy: [place] [id number] - [facility] [address]     Does the patient have enough for 3 days?   [x] Yes   [] No - Send as HP to POD

## 2024-03-18 ENCOUNTER — APPOINTMENT (OUTPATIENT)
Dept: LAB | Facility: CLINIC | Age: 64
End: 2024-03-18
Payer: COMMERCIAL

## 2024-03-18 DIAGNOSIS — N18.31 STAGE 3A CHRONIC KIDNEY DISEASE (HCC): ICD-10-CM

## 2024-03-18 DIAGNOSIS — D47.2 GAMMOPATHY: ICD-10-CM

## 2024-03-18 LAB
ERYTHROCYTE [DISTWIDTH] IN BLOOD BY AUTOMATED COUNT: 12.6 % (ref 11.6–15.1)
HCT VFR BLD AUTO: 43.7 % (ref 36.5–49.3)
HGB BLD-MCNC: 14.2 G/DL (ref 12–17)
IGA SERPL-MCNC: 256 MG/DL (ref 66–433)
IGG SERPL-MCNC: 1128 MG/DL (ref 635–1741)
IGM SERPL-MCNC: 82 MG/DL (ref 45–281)
MCH RBC QN AUTO: 32 PG (ref 26.8–34.3)
MCHC RBC AUTO-ENTMCNC: 32.5 G/DL (ref 31.4–37.4)
MCV RBC AUTO: 98 FL (ref 82–98)
PLATELET # BLD AUTO: 234 THOUSANDS/UL (ref 149–390)
PMV BLD AUTO: 10.5 FL (ref 8.9–12.7)
RBC # BLD AUTO: 4.44 MILLION/UL (ref 3.88–5.62)
WBC # BLD AUTO: 7.11 THOUSAND/UL (ref 4.31–10.16)

## 2024-03-18 PROCEDURE — 36415 COLL VENOUS BLD VENIPUNCTURE: CPT

## 2024-03-18 PROCEDURE — 85027 COMPLETE CBC AUTOMATED: CPT

## 2024-03-18 PROCEDURE — 82784 ASSAY IGA/IGD/IGG/IGM EACH: CPT

## 2024-03-18 PROCEDURE — 83521 IG LIGHT CHAINS FREE EACH: CPT

## 2024-03-18 PROCEDURE — 84165 PROTEIN E-PHORESIS SERUM: CPT

## 2024-03-20 LAB
ALBUMIN SERPL ELPH-MCNC: 3.95 G/DL (ref 3.2–5.1)
ALBUMIN SERPL ELPH-MCNC: 55.6 % (ref 48–70)
ALPHA1 GLOB SERPL ELPH-MCNC: 0.26 G/DL (ref 0.15–0.47)
ALPHA1 GLOB SERPL ELPH-MCNC: 3.7 % (ref 1.8–7)
ALPHA2 GLOB SERPL ELPH-MCNC: 0.97 G/DL (ref 0.42–1.04)
ALPHA2 GLOB SERPL ELPH-MCNC: 13.7 % (ref 5.9–14.9)
BETA GLOB ABNORMAL SERPL ELPH-MCNC: 0.43 G/DL (ref 0.31–0.57)
BETA1 GLOB SERPL ELPH-MCNC: 6 % (ref 4.7–7.7)
BETA2 GLOB SERPL ELPH-MCNC: 5.7 % (ref 3.1–7.9)
BETA2+GAMMA GLOB SERPL ELPH-MCNC: 0.4 G/DL (ref 0.2–0.58)
GAMMA GLOB ABNORMAL SERPL ELPH-MCNC: 1.09 G/DL (ref 0.4–1.66)
GAMMA GLOB SERPL ELPH-MCNC: 15.3 % (ref 6.9–22.3)
IGG/ALB SER: 1.25 {RATIO} (ref 1.1–1.8)
KAPPA LC FREE SER-MCNC: 64.5 MG/L (ref 3.3–19.4)
KAPPA LC FREE/LAMBDA FREE SER: 1.96 {RATIO} (ref 0.26–1.65)
LAMBDA LC FREE SERPL-MCNC: 32.9 MG/L (ref 5.7–26.3)
M PROTEIN 1 MFR SERPL ELPH: 1.3 %
M PROTEIN 1 SERPL ELPH-MCNC: 0.09 G/DL
PROT PATTERN SERPL ELPH-IMP: NORMAL
PROT SERPL-MCNC: 7.1 G/DL (ref 6.4–8.2)

## 2024-03-20 PROCEDURE — 84165 PROTEIN E-PHORESIS SERUM: CPT | Performed by: STUDENT IN AN ORGANIZED HEALTH CARE EDUCATION/TRAINING PROGRAM

## 2024-03-27 ENCOUNTER — CLINICAL SUPPORT (OUTPATIENT)
Dept: FAMILY MEDICINE CLINIC | Facility: CLINIC | Age: 64
End: 2024-03-27

## 2024-03-27 DIAGNOSIS — E11.8 TYPE 2 DIABETES MELLITUS WITH COMPLICATION (HCC): Primary | ICD-10-CM

## 2024-03-27 NOTE — PROGRESS NOTES
[facility] Clinical Integration Pharmacy Services  Sue Mclaughlin, Pharmacist    Jerald Calvin is a 63 y.o. male who was referred to the pharmacist for DM education and management, referred by Conor Coker MD .    Telemedicine consent  The patient was identified by name and date of birth. Jerald Calvin was informed that this is a telemedicine visit and that the visit is being conducted through Telephone.  My office door was closed. No one else was in the room.  He acknowledged consent and understanding of privacy and security of the video platform. The patient has agreed to participate and understands they can discontinue the visit at any time.    Assessment/ Plan     Type 2 Diabetes:  Goal A1c <7% based on ADA guidelines. Most recent A1c above goal  Lab Results   Component Value Date    HGBA1C 9.6 (H) 03/18/2024     Reported Home SMBG  Morning readings remain above goal and average 194 mg/dL  Before lunch, before dinner, and post dinner readings are controlled.   Complications:  Microvascular complications: neuropathy, CKD   Macrovascular complications: peripheral circulatory disorder  Medication options  Hx CKD. On Farxiga which is indicated for renal benefits.   Future consideration-   He would also benefit from GLP-1 agonist as well for additional weight loss/ A1c lowering. He would need to obtain this through patient assistance program due to cost of brand meds through his insurance and unfortunately [email or web address] program is no longer accepting new applicants at this time due to drug shortages.   We did discuss splitting insulin to long acting plus short acting instead of mixed insulin. Would be able to adjust each for better control however patient declines due to increased # of daily injections.   Medication Cost  Approved for [employer]'s patient assistance program through 2024 calendar year    Changes to Medication Regimen:   If PCP is in agreement  A1c has improved, but still above goal. Increase  Novolin 70/30 in evening to 68 units     2. On Additional Therapies:  Statin: yes    ASA: yes   Neuropathy: Gabapentin 300 mg - 2 caps TID     Monitoring: testing 4x daily    Follow-up: 8 weeks    Subjective     Medication Adherence/ Tolerability:  Novolin 70/30 mix vial-   Morning dose  If BG >200- Injects 100 units  If BG < 200-  Injects 50 units   Evening dose  Injects 60 units daily  Dapagliflozin 10 mg daily    Medications Tried in Past:  Janumet- cost constraints   Metformin- d/c due to NEREYDA    2. Lifestyle:   Eating more salads. Most days having 2 meals and bedtime snack  Morning: drinking coffee with splenda and milk  12 PM Lunch- usually a salad but sometimes will have sandwich and iced tea or lemonade (sugar free)   5 PM Dinner - Usually pork chops with potatoes    Snack at 12 PM- bologna sandwich      3. Home monitoring devices  Glucometer: Yes,   CGM: No   BP monitor: unknown     Objective     Lab Results   Component Value Date    HGBA1C 9.6 (H) 03/18/2024    HGBA1C 10.7 (H) 01/12/2024    HGBA1C 9.5 (H) 03/21/2023       Blood Glucose Readings  The patient is currently checking blood glucose 4 times per day. Patient reports with SMBG logs.    Date AM (5:30 AM)  Lunch  (12 PM) Dinner  (5:30 PM) Post-  Dinner  (~7 PM)   2/28 124 168 297 130    142 143 196 113    174 101 227 130    227 156 127 110    249 99 159 110    225 145 125 110    263 159 145 121    222 120 131 110    196 136 172 128    243 78 160 131    279 53 190 127    116 153 117 122    170 108 251 117    125 225 126 110    173 141 266 138    85 232 90 101    153 104 190 123    255 153 137 101    112 230 120 109    181 149 197 123    150 115 134 117    200 64 130 110    293 70 143 117    215 134 104 113    308 117 140 112    189 114 194 121    97 191 125 111   3/27 209 109 154 113   Avg 195 135 162 117       ASCVD Risk:  The ASCVD Risk score (Perla DK, et al., 2019) failed to calculate for the following reasons:    The valid total cholesterol range  is 130 to 320 mg/dL     Vitals:  There were no vitals filed for this visit.    Labs:    Lab Results   Component Value Date    SODIUM 137 03/18/2024    K 4.9 03/18/2024    EGFR 55 03/18/2024    CREATININE 1.35 (H) 03/18/2024    GLUF 153 (H) 03/18/2024    [id number] 611 03/21/2023    MICROALBCRE 427 (H) 03/21/2023         Pharmacist Tracking Tool     Pharmacist Tracking Tool  Reason For Outreach: Embedded Pharmacist  Demographics:  Intervention Method: Phone  Type of Intervention: Follow-Up  Topics Addressed: Diabetes  Pharmacologic Interventions: Dose or Frequency Adjusted  Non-Pharmacologic Interventions: Disease state education, Home Monitoring and Medication/Device education  Time:  Direct Patient Care:  25  mins  Care Coordination:  15  mins  Recommendation Recipient: N/A  Outcome: N/A

## 2024-04-23 ENCOUNTER — VBI (OUTPATIENT)
Dept: ADMINISTRATIVE | Facility: OTHER | Age: 64
End: 2024-04-23

## 2024-04-24 ENCOUNTER — APPOINTMENT (OUTPATIENT)
Dept: LAB | Facility: CLINIC | Age: 64
End: 2024-04-24
Payer: COMMERCIAL

## 2024-04-24 DIAGNOSIS — E78.2 MIXED HYPERLIPIDEMIA: ICD-10-CM

## 2024-04-24 DIAGNOSIS — E11.8 TYPE 2 DIABETES MELLITUS WITH COMPLICATION (HCC): ICD-10-CM

## 2024-04-24 DIAGNOSIS — E11.51 TYPE II DIABETES MELLITUS WITH PERIPHERAL CIRCULATORY DISORDER (HCC): ICD-10-CM

## 2024-04-24 DIAGNOSIS — I10 PRIMARY HYPERTENSION: ICD-10-CM

## 2024-04-24 LAB
CREAT UR-MCNC: 103.5 MG/DL
EST. AVERAGE GLUCOSE BLD GHB EST-MCNC: 214 MG/DL
HBA1C MFR BLD: 9.1 %
MICROALBUMIN UR-MCNC: 143.1 MG/L
MICROALBUMIN/CREAT 24H UR: 138 MG/G CREATININE (ref 0–30)

## 2024-04-24 PROCEDURE — 82043 UR ALBUMIN QUANTITATIVE: CPT

## 2024-04-24 PROCEDURE — 36415 COLL VENOUS BLD VENIPUNCTURE: CPT

## 2024-04-24 PROCEDURE — 82570 ASSAY OF URINE CREATININE: CPT

## 2024-04-24 PROCEDURE — 83036 HEMOGLOBIN GLYCOSYLATED A1C: CPT

## 2024-04-24 PROCEDURE — 3060F POS MICROALBUMINURIA REV: CPT | Performed by: FAMILY MEDICINE

## 2024-04-24 PROCEDURE — 3046F HEMOGLOBIN A1C LEVEL >9.0%: CPT | Performed by: FAMILY MEDICINE

## 2024-04-29 ENCOUNTER — OFFICE VISIT (OUTPATIENT)
Dept: FAMILY MEDICINE CLINIC | Facility: CLINIC | Age: 64
End: 2024-04-29
Payer: COMMERCIAL

## 2024-04-29 VITALS
DIASTOLIC BLOOD PRESSURE: 56 MMHG | BODY MASS INDEX: 35.98 KG/M2 | WEIGHT: 258 LBS | TEMPERATURE: 99 F | SYSTOLIC BLOOD PRESSURE: 100 MMHG | OXYGEN SATURATION: 99 % | HEART RATE: 97 BPM

## 2024-04-29 DIAGNOSIS — N18.31 STAGE 3A CHRONIC KIDNEY DISEASE (HCC): ICD-10-CM

## 2024-04-29 DIAGNOSIS — E11.51 TYPE II DIABETES MELLITUS WITH PERIPHERAL CIRCULATORY DISORDER (HCC): Primary | ICD-10-CM

## 2024-04-29 DIAGNOSIS — E11.42 DIABETIC POLYNEUROPATHY ASSOCIATED WITH TYPE 2 DIABETES MELLITUS (HCC): ICD-10-CM

## 2024-04-29 DIAGNOSIS — I10 PRIMARY HYPERTENSION: ICD-10-CM

## 2024-04-29 PROCEDURE — 99214 OFFICE O/P EST MOD 30 MIN: CPT | Performed by: FAMILY MEDICINE

## 2024-04-29 PROCEDURE — G2211 COMPLEX E/M VISIT ADD ON: HCPCS | Performed by: FAMILY MEDICINE

## 2024-04-29 PROCEDURE — 3074F SYST BP LT 130 MM HG: CPT | Performed by: FAMILY MEDICINE

## 2024-04-29 PROCEDURE — 3078F DIAST BP <80 MM HG: CPT | Performed by: FAMILY MEDICINE

## 2024-04-29 PROCEDURE — 3725F SCREEN DEPRESSION PERFORMED: CPT | Performed by: FAMILY MEDICINE

## 2024-04-29 RX ORDER — OXYCODONE HYDROCHLORIDE 15 MG/1
15 TABLET ORAL EVERY 4 HOURS PRN
Qty: 60 TABLET | Refills: 0 | Status: SHIPPED | OUTPATIENT
Start: 2024-04-29 | End: 2024-05-01 | Stop reason: SDUPTHER

## 2024-04-29 NOTE — ASSESSMENT & PLAN NOTE
Lab Results   Component Value Date    HGBA1C 9.1 (H) 04/24/2024   A1c not at goal but improving.

## 2024-04-29 NOTE — PROGRESS NOTES
Assessment/Plan:    Primary hypertension  BP at goal. Continue current.    Type II diabetes mellitus with peripheral circulatory disorder (HCC)    Lab Results   Component Value Date    HGBA1C 9.1 (H) 04/24/2024   A1c not at goal but improving.    Stage 3a chronic kidney disease (HCC)  Lab Results   Component Value Date    EGFR 55 03/18/2024    EGFR 53 01/12/2024    EGFR 48 11/07/2023    CREATININE 1.35 (H) 03/18/2024    CREATININE 1.40 (H) 01/12/2024    CREATININE 1.53 (H) 11/07/2023   GFR is stable.  Continue current.       Diagnoses and all orders for this visit:    Type II diabetes mellitus with peripheral circulatory disorder (HCC)    Primary hypertension    Diabetic polyneuropathy associated with type 2 diabetes mellitus (HCC)    Stage 3a chronic kidney disease (HCC)          Subjective:      Patient ID: Jerald Calvin is a 63 y.o. male.    He has LBP.  He fell about 8 months ago. He is experiencing more pain.  He is dragging his leg.  I offered PT, pain management, opioid pain medication, Butrans patch, increasing gabapentin and surgical evaluation.  He declines.      His A1c is elevated but has improved drastically.  He has no side effects or hypoglycemia.    His lipids are at goal.  He has normal LFTs and no myalgia or muscle weakness.        The following portions of the patient's history were reviewed and updated as appropriate: He  has a past medical history of Diabetic foot ulcer with osteomyelitis ([facility]) (9/28/2018).  He   Patient Active Problem List    Diagnosis Date Noted    Hypogonadism in male 08/24/2022    Heart murmur 08/12/2022    Type II diabetes mellitus with peripheral circulatory disorder (HCC)     Stage 3a chronic kidney disease (HCC) 11/19/2021    Obesity, morbid (HCC) 08/06/2021    Diabetic polyneuropathy associated with type 2 diabetes mellitus (HCC) 03/29/2019    Hyperlipidemia 09/28/2018    Primary hypertension 09/28/2018     He  has a past surgical history that includes Appendectomy;  "Back surgery; Elbow surgery; Tonsillectomy; and Wrist surgery.  His family history includes Diabetes in his father and mother; Heart disease in his mother; Stroke in his brother and sister.  He  reports that he has quit smoking. His smoking use included cigarettes. He has never used smokeless tobacco. He reports that he does not currently use alcohol. He reports that he does not use drugs.  Current Outpatient Medications   Medication Sig Dispense Refill    ascorbic acid (VITAMIN C) 1000 MG tablet Take 2,000 mg by mouth      atorvastatin (LIPITOR) 40 mg tablet take 1 tablet by mouth once daily 90 tablet 3    b complex vitamins capsule Take 1 capsule by mouth daily      B-D SYRINGE/NEEDLE 3CC/22GX1.5 22G X 1-1/2\" 3 ML MISC use as directed 100 each 3    Blood Glucose Monitoring Suppl (OneTouch Verio Flex System) w/Device KIT USE AS DIRECTED BY MD TESTING FOUR TIMES DAILY      Cholecalciferol 25 MCG (1000 UT) capsule Take 1,000 Units by mouth      dapagliflozin (Farxiga) 10 MG tablet Take 1 tablet (10 mg total) by mouth daily 90 tablet 3    diphenhydrAMINE-acetaminophen (TYLENOL PM)  MG TABS Take 2 tablets by mouth daily at bedtime as needed for sleep      Dodex 1000 MCG/ML inject 1 milliliter ( 1000 MCG ) intramuscularly Every Month 10 mL 5    gabapentin (NEURONTIN) 300 mg capsule Take 2 capsules (600 mg total) by mouth 3 (three) times a day 540 capsule 1    glucose blood (OneTouch Verio) test strip CHECK BLOOD SUGAR FOUR TIMES DAILY 400 each 1    ibuprofen (MOTRIN) 800 mg tablet Take 1 tablet (800 mg total) by mouth every 6 (six) hours as needed for mild pain 360 tablet 3    lisinopril (ZESTRIL) 10 mg tablet take 1 tablet by mouth once daily 90 tablet 1    NovoLIN 70/30 ReliOn (70-30) 100 UNIT/ML subcutaneous injection INJECT 75  UNITS SUBCUTANEOUSLY TWICE DAILY 50 mL 3    OneTouch Delica Lancets 33G MISC Check blood sugars four times daily. Please substitute with appropriate alternative as covered by " "patient's insurance. Dx: E11.65 400 each 3    vitamin E, tocopherol, 1,000 units capsule Take 1,000 Units by mouth daily       No current facility-administered medications for this visit.     Current Outpatient Medications on File Prior to Visit   Medication Sig    ascorbic acid (VITAMIN C) 1000 MG tablet Take 2,000 mg by mouth    atorvastatin (LIPITOR) 40 mg tablet take 1 tablet by mouth once daily    b complex vitamins capsule Take 1 capsule by mouth daily    B-D SYRINGE/NEEDLE 3CC/22GX1.5 22G X 1-1/2\" 3 ML MISC use as directed    Blood Glucose Monitoring Suppl (OneTouch Verio Flex System) w/Device KIT USE AS DIRECTED BY MD TESTING FOUR TIMES DAILY    Cholecalciferol 25 MCG (1000 UT) capsule Take 1,000 Units by mouth    dapagliflozin (Farxiga) 10 MG tablet Take 1 tablet (10 mg total) by mouth daily    diphenhydrAMINE-acetaminophen (TYLENOL PM)  MG TABS Take 2 tablets by mouth daily at bedtime as needed for sleep    Dodex 1000 MCG/ML inject 1 milliliter ( 1000 MCG ) intramuscularly Every Month    gabapentin (NEURONTIN) 300 mg capsule Take 2 capsules (600 mg total) by mouth 3 (three) times a day    glucose blood (OneTouch Verio) test strip CHECK BLOOD SUGAR FOUR TIMES DAILY    ibuprofen (MOTRIN) 800 mg tablet Take 1 tablet (800 mg total) by mouth every 6 (six) hours as needed for mild pain    lisinopril (ZESTRIL) 10 mg tablet take 1 tablet by mouth once daily    NovoLIN 70/30 ReliOn (70-30) 100 UNIT/ML subcutaneous injection INJECT 75  UNITS SUBCUTANEOUSLY TWICE DAILY    Oneuch Delica Lancets 33G MISC Check blood sugars four times daily. Please substitute with appropriate alternative as covered by patient's insurance. Dx: E11.65    vitamin E, tocopherol, 1,000 units capsule Take 1,000 Units by mouth daily     No current facility-administered medications on file prior to visit.     He has No Known Allergies..    Review of Systems   All other systems reviewed and are negative.        Objective:      BP " 100/56 (BP Location: Left arm, Patient Position: Sitting)   Pulse 97   Temp 99 °F (37.2 °C) (Tympanic)   Wt 117 kg (258 lb)   SpO2 99%   BMI 35.98 kg/m²          Physical Exam  Vitals and nursing note reviewed.   Constitutional:       Appearance: Normal appearance. He is obese.   Cardiovascular:      Rate and Rhythm: Normal rate and regular rhythm.      Pulses: Normal pulses.      Heart sounds: Normal heart sounds.   Pulmonary:      Effort: Pulmonary effort is normal.      Breath sounds: Normal breath sounds.   Abdominal:      General: Abdomen is flat. Bowel sounds are normal.      Palpations: Abdomen is soft.   Musculoskeletal:         General: Normal range of motion.      Cervical back: Normal range of motion and neck supple.   Skin:     General: Skin is warm and dry.      Capillary Refill: Capillary refill takes less than 2 seconds.   Neurological:      General: No focal deficit present.      Mental Status: He is alert and oriented to person, place, and time. Mental status is at baseline.   Psychiatric:         Mood and Affect: Mood normal.         Behavior: Behavior normal.         Thought Content: Thought content normal.         Judgment: Judgment normal.

## 2024-04-29 NOTE — ASSESSMENT & PLAN NOTE
Lab Results   Component Value Date    EGFR 55 03/18/2024    EGFR 53 01/12/2024    EGFR 48 11/07/2023    CREATININE 1.35 (H) 03/18/2024    CREATININE 1.40 (H) 01/12/2024    CREATININE 1.53 (H) 11/07/2023   GFR is stable.  Continue current.

## 2024-05-01 DIAGNOSIS — E11.42 DIABETIC POLYNEUROPATHY ASSOCIATED WITH TYPE 2 DIABETES MELLITUS (HCC): ICD-10-CM

## 2024-05-01 NOTE — TELEPHONE ENCOUNTER
Patient is requesting that this prescription be sent to a different pharmacy- [facility].

## 2024-05-02 RX ORDER — OXYCODONE HYDROCHLORIDE 15 MG/1
15 TABLET ORAL EVERY 4 HOURS PRN
Qty: 60 TABLET | Refills: 0 | Status: SHIPPED | OUTPATIENT
Start: 2024-05-02

## 2024-05-08 ENCOUNTER — CLINICAL SUPPORT (OUTPATIENT)
Dept: FAMILY MEDICINE CLINIC | Facility: CLINIC | Age: 64
End: 2024-05-08

## 2024-05-08 DIAGNOSIS — E11.8 TYPE 2 DIABETES MELLITUS WITH COMPLICATION (HCC): Primary | ICD-10-CM

## 2024-05-08 NOTE — PROGRESS NOTES
[facility] Clinical [facility] Pharm[facility] Services  Sue Mclaughlin, Pharmacist    Jerald Calvin is a 63 y.o. male who was referred to the pharmacist for DM education and management, referred by Conor Coker MD .    Telemedicine consent  The patient was identified by name and date of birth. Jerald Calvin was informed that this is a telemedicine visit and that the visit is being conducted through Telephone.  My office door was closed. No one else was in the room.  He acknowledged consent and understanding of privacy and security of the video platform. The patient has agreed to participate and understands they can discontinue the visit at any time.    Assessment/ Plan     Type 2 Diabetes:  Goal A1c <7% based on ADA guidelines. Most recent A1c above goal  Lab Results   Component Value Date    HGBA1C 9.1 (H) 04/24/2024     Reported Home SMBG  Morning readings remain above goal and average 200 mg/dL  Before lunch, before dinner, and post dinner readings are controlled.   Complications:  Microvascular complications: neuropathy, CKD   Macrovascular complications: peripheral circulatory disorder  Medication options  Hx CKD. On Farxiga which is indicated for renal benefits.   Future consideration-   He would also benefit from GLP-1 agonist as well for additional weight loss/ A1c lowering. He would need to obtain this through patient assistance program due to cost of brand meds through his insurance and unfortunately [employer] program is no longer accepting new applicants at this time due to drug shortages.   Medication Cost  Approved for [employer]'s patient assistance program through 2024 calendar year    Changes to Medication Regimen:   If PCP is in agreement  A1c continues to improve but still above goal.   Patient has been skipping evening dose of Novolin if BG < 120 mg/dL. I did educate him that even though reading going into that meal is controlled, if he completely skips that insulin dose it will cause  "hyperglycemia which is evident by the elevated readings in the morning the day after a skipped dose. He will not inject the full dose of Novolin 70/30 of 60 units if his pre meal glucose is <120 mg/dL however he was agreeable to injecting half of that dose or 30 units.     2. On Additional Therapies:  Statin: yes    ASA: yes   Neuropathy: Gabapentin 300 mg - 2 caps TID     Monitoring: testing 4x daily    Follow-up: 6 weeks    Subjective     Medication Adherence/ Tolerability:  Novolin 70/30 mix vial-   Morning dose  If BG >200- Injects 100 units  If BG < 200-  Injects 50 units   Evening dose  Will not inject anything if reading is <120 mg/dL  Otherwise he injects 55-60 units with evening snack (sandwich)  Dapagliflozin 10 mg daily    Medications Tried in Past:  Janumet- cost constraints   Metformin- d/c due to NEREYDA    2. Lifestyle:   Eating more salads. Most days having 2 meals and bedtime snack (ends up be 3 total meals)  Morning: drinking coffee with splenda and milk  12 PM Lunch- usually a salad but sometimes will have sandwich and iced tea or lemonade (sugar free)   5 PM Dinner - Usually pork chops with potatoes    Snack at 12 PM- bologna sandwich      3. Home monitoring devices  Glucometer: Yes,   CGM: No   BP monitor: unknown     Objective     Lab Results   Component Value Date    HGBA1C 9.1 (H) 04/24/2024    HGBA1C 9.6 (H) 03/18/2024    HGBA1C 10.7 (H) 01/12/2024       Blood Glucose Readings  The patient is currently checking blood glucose 4 times per day. Patient reports with SMBG logs.    Date 1st meal  (5:30 AM)  2nd meal  (12 PM) 3rd meal  (5:30 PM) 4th meal  \"Bedtime snack\"  (~7 PM)   4/1 189 285 82 101   4/2 259 101 164 122    150 140 143 117    244 110 217 131    260 132 180 119    85 171 183 124    270 97 155 103    213 82 128 110    227 74 108 64    218 99 221 131    95 147 165 113    233 96 207 127    197 97 95 101    265 163 119 103    259 159 133 110    194 111 110 138    204 131 113 100    236 88 " 115 100    257 127 161 126    238 109 398 137    100 179 119 101    261 74 171 116    225 159 131 117    183 145 138 116    209 124 97 101    413 94 178 121    94 178 121 124    240 169 125 109    164 191 82 101    170 121 135 107    99 264 102 110    142 216 119 101    151 111 141 107    150 147  110    196 100 82 100    136 271 63 101   5/8 187 100     Avg 200 139 142 111       ASCVD Risk:  The ASCVD Risk score (Perla OATES, et al., 2019) failed to calculate for the following reasons:    The valid total cholesterol range is 130 to 320 mg/dL     Vitals:  There were no vitals filed for this visit.    Labs:    Lab Results   Component Value Date    SODIUM 137 03/18/2024    K 4.9 03/18/2024    EGFR 55 03/18/2024    CREATININE 1.35 (H) 03/18/2024    GLUF 153 (H) 03/18/2024    [id number] 611 03/21/2023    MICROALBCRE 138 (H) 04/24/2024         Pharmacist Tracking Tool     Pharmacist Tracking Tool  Reason For Outreach: Embedded Pharmacist  Demographics:  Intervention Method: Phone  Type of Intervention: Follow-Up  Topics Addressed: Diabetes  Pharmacologic Interventions: Dose or Frequency Adjusted  Non-Pharmacologic Interventions: Disease state education, Home Monitoring and Medication/Device education  Time:  Direct Patient Care:  25  mins  Care Coordination:  15  mins  Recommendation Recipient: N/A  Outcome: N/A

## 2024-05-13 DIAGNOSIS — S02.85XA CLOSED FRACTURE OF RIGHT ORBIT, INITIAL ENCOUNTER (HCC): ICD-10-CM

## 2024-05-14 ENCOUNTER — VBI (OUTPATIENT)
Dept: ADMINISTRATIVE | Facility: OTHER | Age: 64
End: 2024-05-14

## 2024-05-14 RX ORDER — IBUPROFEN 800 MG/1
TABLET ORAL
Qty: 360 TABLET | Refills: 3 | Status: SHIPPED | OUTPATIENT
Start: 2024-05-14

## 2024-05-15 DIAGNOSIS — E53.8 B12 DEFICIENCY: ICD-10-CM

## 2024-05-16 RX ORDER — NEEDLES, SAFETY 22GX1 1/2"
NEEDLE, DISPOSABLE MISCELLANEOUS
Qty: 100 EACH | Refills: 1 | Status: SHIPPED | OUTPATIENT
Start: 2024-05-16

## 2024-05-29 ENCOUNTER — TELEPHONE (OUTPATIENT)
Dept: ADMINISTRATIVE | Facility: OTHER | Age: 64
End: 2024-05-29

## 2024-05-29 NOTE — TELEPHONE ENCOUNTER
----- Message from Jael VENEGAS sent at 5/28/2024  1:38 PM EDT -----  Regarding: Care Gap request  05/28/24 1:38 PM    Hello, our patient attached above has had Diabetic Eye Exam completed/performed. Please assist in updating the patient chart by pulling the document from the Media Tab. The date of service is 2021.     Thank you,  Jael Lau  [facility] PRIMARY [facility]

## 2024-05-30 NOTE — TELEPHONE ENCOUNTER
Upon review of the In Basket request we have noted that the patient chart is already updated with the DOS given.    Any additional questions or concerns should be emailed to the Practice Liaisons via the appropriate education email address, please do not reply via In Basket.    Thank you  Lilia Mercedes   [facility]

## 2024-06-05 DIAGNOSIS — E11.8 TYPE 2 DIABETES MELLITUS WITH COMPLICATION (HCC): ICD-10-CM

## 2024-06-05 DIAGNOSIS — E78.5 HYPERLIPIDEMIA, UNSPECIFIED HYPERLIPIDEMIA TYPE: ICD-10-CM

## 2024-06-05 RX ORDER — HUMAN INSULIN 100 [IU]/ML
INJECTION, SUSPENSION SUBCUTANEOUS
Qty: 50 ML | Refills: 0 | Status: SHIPPED | OUTPATIENT
Start: 2024-06-05

## 2024-06-05 RX ORDER — ATORVASTATIN CALCIUM 40 MG/1
TABLET, FILM COATED ORAL
Qty: 90 TABLET | Refills: 1 | Status: SHIPPED | OUTPATIENT
Start: 2024-06-05

## 2024-06-19 ENCOUNTER — CLINICAL SUPPORT (OUTPATIENT)
Dept: FAMILY MEDICINE CLINIC | Facility: CLINIC | Age: 64
End: 2024-06-19

## 2024-06-19 DIAGNOSIS — E11.8 TYPE 2 DIABETES MELLITUS WITH COMPLICATION (HCC): Primary | ICD-10-CM

## 2024-06-19 NOTE — PROGRESS NOTES
[facility] Clinical Integration Pharmacy Services  Sue Mclaguhlin, Pharmacist    Jerald Calvin is a 64 y.o. male who was referred to the pharmacist for DM education and management, referred by Conor Coker MD .    Telemedicine consent  The patient was identified by name and date of birth. Jerald Calvin was informed that this is a telemedicine visit and that the visit is being conducted through Telephone.  My office door was closed. No one else was in the room.  He acknowledged consent and understanding of privacy and security of the video platform. The patient has agreed to participate and understands they can discontinue the visit at any time.    Assessment/ Plan     Type 2 Diabetes:  Goal A1c <7% based on ADA guidelines. Most recent A1c above goal  Lab Results   Component Value Date    HGBA1C 9.1 (H) 04/24/2024     Reported Home SMBG  Morning readings have come down. Average fasting is 161 mg/dL which has improved from 200 mg/dL   Complications:  Microvascular complications: neuropathy, CKD   Macrovascular complications: peripheral circulatory disorder  Medication options  Hx CKD. On Farxiga which is indicated for renal benefits.   Future consideration-   He would also benefit from GLP-1 agonist as well for additional weight loss/ A1c lowering. He would need to obtain this through patient assistance program due to cost of brand meds through his insurance and unfortunately [employer] program is no longer accepting new applicants at this time due to drug shortages.   Medication Cost  Approved for [employer]'s patient assistance program through 2024 calendar year    Changes to Medication Regimen:   If PCP is in agreement  Blood glucose readings are trending down now that he injects insulin more consistently with all meals.   Continue current doses.     2. On Additional Therapies:  Statin: yes    ASA: yes   Neuropathy: Gabapentin 300 mg - 2 caps TID     Monitoring: testing 4x daily    Follow-up: 8  "weeks    Subjective     Medication Adherence/ Tolerability:  Novolin 70/30 mix vial-   Morning dose  If BG >200- Injects 100 units  If BG < 200-  Injects 50 units   Evening dose  If <120 mg/dL- Injects 30 units   If BG> se he injects 55-60 units with evening snack (sandwich)  Dapagliflozin 10 mg daily    Medications Tried in Past:  Janumet- cost constraints   Metformin- d/c due to NEREYDA    2. Lifestyle:   Eating more salads. Most days having 2 meals and bedtime snack (ends up be 3 total meals)  Morning: drinking coffee with splenda and milk  12 PM Lunch- usually a salad but sometimes will have sandwich and iced tea or lemonade (sugar free)   5 PM Dinner - Usually pork chops with potatoes    Snack at 12 PM- bologna sandwich      3. Home monitoring devices  Glucometer: Yes,   CGM: No   BP monitor: unknown     Objective     Lab Results   Component Value Date    HGBA1C 9.1 (H) 04/24/2024    HGBA1C 9.6 (H) 03/18/2024    HGBA1C 10.7 (H) 01/12/2024       Blood Glucose Readings  The patient is currently checking blood glucose 4 times per day. Patient reports with SMBG logs.    Date 1st meal  (5:30 AM)  2nd meal  (12 PM) 3rd meal  (5:30 PM) 4th meal  \"Bedtime snack\"  (~7 PM)   6/1 148 130 189 134    177 214 119 107    133 171 138 121    196 83 244 139    223 67 191 117    118 219 120 110    150 121 165 121    151 156 81 110    116 169 170 119    145 136 66 114    207 119 167 121    146 118 186 129    194 120 151 113    157 102 251 131    115 197 99 101    194 154 112 100    201 131 89 110   6/18 133 224 83 114   Avg 161 146 146 116       ASCVD Risk:  The ASCVD Risk score (Perla DK, et al., 2019) failed to calculate for the following reasons:    The valid total cholesterol range is 130 to 320 mg/dL     Vitals:  There were no vitals filed for this visit.    Labs:    Lab Results   Component Value Date    SODIUM 137 03/18/2024    K 4.9 03/18/2024    EGFR 55 03/18/2024    CREATININE 1.35 (H) 03/18/2024    GLUF 153 (H) 03/18/2024 "    [id number] 611 03/21/2023    MICROALBCRE 138 (H) 04/24/2024         Pharmacist Tracking Tool     Pharmacist Tracking Tool  Reason For Outreach: Embedded Pharmacist  Demographics:  Intervention Method: Phone  Type of Intervention: Follow-Up  Topics Addressed: Diabetes  Pharmacologic Interventions: N/A  Non-Pharmacologic Interventions: Disease state education, Home Monitoring and Medication/Device education  Time:  Direct Patient Care:  25  mins  Care Coordination:  15  mins  Recommendation Recipient: N/A  Outcome: N/A

## 2024-07-01 ENCOUNTER — APPOINTMENT (OUTPATIENT)
Dept: LAB | Facility: CLINIC | Age: 64
End: 2024-07-01
Payer: COMMERCIAL

## 2024-07-01 DIAGNOSIS — N18.31 CHRONIC KIDNEY DISEASE (CKD) STAGE G3A/A1, MODERATELY DECREASED GLOMERULAR FILTRATION RATE (GFR) BETWEEN 45-59 ML/MIN/1.73 SQUARE METER AND ALBUMINURIA CREATININE RATIO LESS THAN 30 MG/G (HCC): ICD-10-CM

## 2024-07-01 DIAGNOSIS — E55.9 AVITAMINOSIS D: ICD-10-CM

## 2024-07-01 LAB
25(OH)D3 SERPL-MCNC: 30 NG/ML (ref 30–100)
ANION GAP SERPL CALCULATED.3IONS-SCNC: 12 MMOL/L (ref 4–13)
BUN SERPL-MCNC: 24 MG/DL (ref 5–25)
CALCIUM SERPL-MCNC: 9.4 MG/DL (ref 8.4–10.2)
CHLORIDE SERPL-SCNC: 104 MMOL/L (ref 96–108)
CO2 SERPL-SCNC: 23 MMOL/L (ref 21–32)
CREAT SERPL-MCNC: 1.4 MG/DL (ref 0.6–1.3)
CREAT UR-MCNC: 98.8 MG/DL
CREAT UR-MCNC: 98.8 MG/DL
GFR SERPL CREATININE-BSD FRML MDRD: 53 ML/MIN/1.73SQ M
GLUCOSE P FAST SERPL-MCNC: 165 MG/DL (ref 65–99)
MICROALBUMIN UR-MCNC: 251.8 MG/L
MICROALBUMIN/CREAT 24H UR: 255 MG/G CREATININE (ref 0–30)
POTASSIUM SERPL-SCNC: 4.6 MMOL/L (ref 3.5–5.3)
PROT UR-MCNC: 43 MG/DL
PROT/CREAT UR: 0.44 MG/G{CREAT} (ref 0–0.1)
PTH-INTACT SERPL-MCNC: 40.6 PG/ML (ref 12–88)
SODIUM SERPL-SCNC: 139 MMOL/L (ref 135–147)

## 2024-07-01 PROCEDURE — 83970 ASSAY OF PARATHORMONE: CPT

## 2024-07-01 PROCEDURE — 84156 ASSAY OF PROTEIN URINE: CPT

## 2024-07-01 PROCEDURE — 80048 BASIC METABOLIC PNL TOTAL CA: CPT

## 2024-07-01 PROCEDURE — 36415 COLL VENOUS BLD VENIPUNCTURE: CPT

## 2024-07-01 PROCEDURE — 82043 UR ALBUMIN QUANTITATIVE: CPT

## 2024-07-01 PROCEDURE — 82306 VITAMIN D 25 HYDROXY: CPT

## 2024-07-01 PROCEDURE — 82570 ASSAY OF URINE CREATININE: CPT

## 2024-07-03 DIAGNOSIS — E11.8 TYPE 2 DIABETES MELLITUS WITH COMPLICATION (HCC): ICD-10-CM

## 2024-07-03 RX ORDER — HUMAN INSULIN 100 [IU]/ML
INJECTION, SUSPENSION SUBCUTANEOUS
Qty: 50 ML | Refills: 5 | Status: SHIPPED | OUTPATIENT
Start: 2024-07-03

## 2024-07-03 NOTE — TELEPHONE ENCOUNTER
Reason for call:   [x] Refill   [] Prior Auth  [] Other:     Office:   [x] PCP/Provider -   [] Specialty/Provider -     Medication: NovoLIN 70/30 ReliOn (70-30) 100 UNIT/ML subcutaneous injection         Pharmacy: [facility]     Does the patient have enough for 3 days?   [x] Yes   [] No - Send as HP to POD

## 2024-07-11 ENCOUNTER — TELEPHONE (OUTPATIENT)
Dept: FAMILY MEDICINE CLINIC | Facility: CLINIC | Age: 64
End: 2024-07-11

## 2024-07-11 NOTE — TELEPHONE ENCOUNTER
Lm for pt to call back to find out if pt has had a diabetic eye exam this year. If not please schedule pt on the nurse schedule for a diabetic eye exam in August. Advise pt there is no dilation required and he can drive after  
Spoke with pt who confirmed that he has not had a diabetic eye exam in the last year.    Pt declined to schedule a diabetic eye exam at call, stating that he is going for new glasses in 3 months and will have a full eye exam at that time.  
noted  
Self

## 2024-07-19 ENCOUNTER — VBI (OUTPATIENT)
Dept: ADMINISTRATIVE | Facility: OTHER | Age: 64
End: 2024-07-19

## 2024-07-19 NOTE — TELEPHONE ENCOUNTER
07/19/24 2:29 PM     Chart reviewed for CRC: Colonoscopy was/were submitted to the patient's insurance.     Corinna Coker MA   [facility]

## 2024-08-07 DIAGNOSIS — E53.8 B12 DEFICIENCY: ICD-10-CM

## 2024-08-07 RX ORDER — CYANOCOBALAMIN 1000 UG/ML
INJECTION, SOLUTION INTRAMUSCULAR; SUBCUTANEOUS
Qty: 10 ML | Refills: 5 | Status: SHIPPED | OUTPATIENT
Start: 2024-08-07

## 2024-08-13 ENCOUNTER — CLINICAL SUPPORT (OUTPATIENT)
Dept: FAMILY MEDICINE CLINIC | Facility: CLINIC | Age: 64
End: 2024-08-13

## 2024-08-13 DIAGNOSIS — E11.8 TYPE 2 DIABETES MELLITUS WITH COMPLICATION (HCC): Primary | ICD-10-CM

## 2024-08-13 NOTE — PROGRESS NOTES
[facility] Clinical Integration Pharmacy Services  Sue Mclaughlin, Pharmacist    Jerald Calvin is a 64 y.o. male who was referred to the pharmacist for DM education and management, referred by Conor Coker MD .    Telemedicine consent  The patient was identified by name and date of birth. Jerald Calvin was informed that this is a telemedicine visit and that the visit is being conducted through Telephone.  My office door was closed. No one else was in the room.  He acknowledged consent and understanding of privacy and security of the video platform. The patient has agreed to participate and understands they can discontinue the visit at any time.    Assessment/ Plan     Type 2 Diabetes:  Goal A1c <7% based on ADA guidelines. Most recent A1c above goal  Lab Results   Component Value Date    HGBA1C 9.1 (H) 04/24/2024     Reported Home SMBG  See log below. Glucose control is improving  Complications:  Microvascular complications: neuropathy, CKD   Macrovascular complications: peripheral circulatory disorder  Medication options  Hx CKD. On Farxiga which is indicated for renal benefits.   Future consideration-   He would also benefit from GLP-1 agonist as well for additional weight loss/ A1c lowering. He would need to obtain this through patient assistance program due to cost of brand meds through his insurance and unfortunately [employer] program is no longer accepting new applicants at this time due to drug shortages.   Medication Cost  Approved for [employer]'s patient assistance program through 2024 calendar year    Changes to Medication Regimen:   If PCP is in agreement  Blood glucose readings are trending down now that he injects insulin more consistently with all meals.   Continue current doses.   We did discuss adding in a small meal or Glucerna into morning since he does not eat breakfast    2. On Additional Therapies:  Statin: yes    ASA: yes   Neuropathy: Gabapentin 300 mg - 2 caps TID     Monitoring:  "testing 4x daily    Follow-up: 8 weeks    Subjective     Medication Adherence/ Tolerability:  Novolin 70/30 mix vial-   Morning dose  If BG >200- Injects 100 units  If BG < 200-  Injects 50 units   Evening dose  If <120 mg/dL- Injects 30 units   If BG> se he injects 55-60 units with evening snack (sandwich)  Dapagliflozin 10 mg daily    Medications Tried in Past:  Janumet- cost constraints   Metformin- d/c due to NEREYDA    2. Lifestyle:   Eating more salads. Most days having 2 meals and bedtime snack (ends up be 3 total meals)  Morning: drinking coffee with splenda and milk  12 PM Lunch- usually a salad but sometimes will have sandwich and iced tea or lemonade (sugar free)   5 PM Dinner - Usually pork chops with potatoes    Snack at 12 PM- bologna sandwich      3. Home monitoring devices  Glucometer: Yes,   CGM: No   BP monitor: unknown     Objective     Lab Results   Component Value Date    HGBA1C 9.1 (H) 04/24/2024    HGBA1C 9.6 (H) 03/18/2024    HGBA1C 10.7 (H) 01/12/2024       Blood Glucose Readings  The patient is currently checking blood glucose 4 times per day. Patient reports with SMBG logs.    Date Not eating/ fasting  (5:30 AM)  2nd meal  (~11:30 AM)  3rd meal  (5:30 PM) 4th meal  \"Bedtime snack\"  (~11 PM)   7/30 187 185 125 109(    108 202 96 107    92 208 108 112    276 149 118 102    121 252 155 115    143 188 130 110    174 142 148 117    161 136 171 127    205 61 180 118    167 245 85 121    165 261 91 107    147 288 93 109    184 173 140 121   8/12 162 72 187 117   Avg 163 183 130 113       ASCVD Risk:  The ASCVD Risk score ([place] DK, et al., 2019) failed to calculate for the following reasons:    The valid total cholesterol range is 130 to 320 mg/dL     Vitals:  There were no vitals filed for this visit.    Labs:    Lab Results   Component Value Date    SODIUM 139 07/01/2024    K 4.6 07/01/2024    EGFR 53 07/01/2024    CREATININE 1.40 (H) 07/01/2024    GLUF 165 (H) 07/01/2024    [id number] 611 " 03/21/2023    MICROALBCRE 255 (H) 07/01/2024         Pharmacist Tracking Tool     Pharmacist Tracking Tool  Reason For Outreach: Embedded Pharmacist  Demographics:  Intervention Method: Phone  Type of Intervention: Follow-Up  Topics Addressed: Diabetes  Pharmacologic Interventions: N/A  Non-Pharmacologic Interventions: Disease state education, Home Monitoring and Medication/Device education  Time:  Direct Patient Care:  20  mins  Care Coordination:  10  mins  Recommendation Recipient: N/A  Outcome: N/A

## 2024-09-04 DIAGNOSIS — E11.8 TYPE 2 DIABETES MELLITUS WITH COMPLICATION (HCC): ICD-10-CM

## 2024-09-04 NOTE — TELEPHONE ENCOUNTER
Reason for call:   [x] Refill   [] Prior Auth  [] Other:     Office:   [x] PCP/Provider - PRIMARY CARE [facility] POD  Authorized By: Conor Coker MD  [] Specialty/Provider -     Medication: glucose blood (OneTouch Verio) test strip     Dose/Frequency: CHECK BLOOD SUGAR FOUR TIMES DAILY     Quantity: 400 each     Pharmacy: [facility] [address] [place] [address] [address]    Does the patient have enough for 3 days?   [x] Yes   [] No - Send as HP to POD

## 2024-09-05 DIAGNOSIS — E11.8 TYPE 2 DIABETES MELLITUS WITH COMPLICATION (HCC): ICD-10-CM

## 2024-09-05 DIAGNOSIS — N18.31 STAGE 3A CHRONIC KIDNEY DISEASE (HCC): ICD-10-CM

## 2024-09-05 DIAGNOSIS — I10 PRIMARY HYPERTENSION: ICD-10-CM

## 2024-09-05 RX ORDER — BLOOD SUGAR DIAGNOSTIC
STRIP MISCELLANEOUS
Qty: 400 EACH | Refills: 1 | Status: SHIPPED | OUTPATIENT
Start: 2024-09-05

## 2024-09-06 DIAGNOSIS — E11.51 TYPE II DIABETES MELLITUS WITH PERIPHERAL CIRCULATORY DISORDER (HCC): ICD-10-CM

## 2024-09-06 RX ORDER — LISINOPRIL 10 MG/1
10 TABLET ORAL DAILY
Qty: 90 TABLET | Refills: 1 | Status: SHIPPED | OUTPATIENT
Start: 2024-09-06

## 2024-09-06 RX ORDER — DAPAGLIFLOZIN 10 MG/1
10 TABLET, FILM COATED ORAL DAILY
Qty: 90 TABLET | Refills: 3 | Status: SHIPPED | OUTPATIENT
Start: 2024-09-06

## 2024-09-10 ENCOUNTER — LAB (OUTPATIENT)
Dept: LAB | Facility: CLINIC | Age: 64
End: 2024-09-10
Payer: COMMERCIAL

## 2024-09-10 DIAGNOSIS — E11.51 TYPE II DIABETES MELLITUS WITH PERIPHERAL CIRCULATORY DISORDER (HCC): Primary | ICD-10-CM

## 2024-09-10 LAB
ALBUMIN SERPL BCG-MCNC: 4.2 G/DL (ref 3.5–5)
ALP SERPL-CCNC: 54 U/L (ref 34–104)
ALT SERPL W P-5'-P-CCNC: 24 U/L (ref 7–52)
ANION GAP SERPL CALCULATED.3IONS-SCNC: 9 MMOL/L (ref 4–13)
AST SERPL W P-5'-P-CCNC: 22 U/L (ref 13–39)
BILIRUB SERPL-MCNC: 0.34 MG/DL (ref 0.2–1)
BUN SERPL-MCNC: 24 MG/DL (ref 5–25)
CALCIUM SERPL-MCNC: 9 MG/DL (ref 8.4–10.2)
CHLORIDE SERPL-SCNC: 103 MMOL/L (ref 96–108)
CHOLEST SERPL-MCNC: 127 MG/DL
CO2 SERPL-SCNC: 24 MMOL/L (ref 21–32)
CREAT SERPL-MCNC: 1.35 MG/DL (ref 0.6–1.3)
GFR SERPL CREATININE-BSD FRML MDRD: 55 ML/MIN/1.73SQ M
GLUCOSE P FAST SERPL-MCNC: 166 MG/DL (ref 65–99)
HDLC SERPL-MCNC: 59 MG/DL
LDLC SERPL CALC-MCNC: 53 MG/DL (ref 0–100)
NONHDLC SERPL-MCNC: 68 MG/DL
POTASSIUM SERPL-SCNC: 4.5 MMOL/L (ref 3.5–5.3)
PROT SERPL-MCNC: 7.1 G/DL (ref 6.4–8.4)
SODIUM SERPL-SCNC: 136 MMOL/L (ref 135–147)
TRIGL SERPL-MCNC: 74 MG/DL

## 2024-09-10 PROCEDURE — 80061 LIPID PANEL: CPT

## 2024-09-10 PROCEDURE — 80053 COMPREHEN METABOLIC PANEL: CPT

## 2024-09-10 PROCEDURE — 36415 COLL VENOUS BLD VENIPUNCTURE: CPT

## 2024-09-11 LAB
EST. AVERAGE GLUCOSE BLD GHB EST-MCNC: 217 MG/DL
HBA1C MFR BLD: 9.2 %

## 2024-09-17 ENCOUNTER — TELEPHONE (OUTPATIENT)
Dept: FAMILY MEDICINE CLINIC | Facility: CLINIC | Age: 64
End: 2024-09-17

## 2024-09-17 ENCOUNTER — OFFICE VISIT (OUTPATIENT)
Dept: FAMILY MEDICINE CLINIC | Facility: CLINIC | Age: 64
End: 2024-09-17
Payer: COMMERCIAL

## 2024-09-17 VITALS
WEIGHT: 258.4 LBS | DIASTOLIC BLOOD PRESSURE: 74 MMHG | SYSTOLIC BLOOD PRESSURE: 112 MMHG | OXYGEN SATURATION: 96 % | HEART RATE: 83 BPM | TEMPERATURE: 98 F | BODY MASS INDEX: 36.18 KG/M2 | HEIGHT: 71 IN

## 2024-09-17 DIAGNOSIS — E78.2 MIXED HYPERLIPIDEMIA: ICD-10-CM

## 2024-09-17 DIAGNOSIS — N18.31 STAGE 3A CHRONIC KIDNEY DISEASE (HCC): ICD-10-CM

## 2024-09-17 DIAGNOSIS — E66.01 OBESITY, MORBID (HCC): ICD-10-CM

## 2024-09-17 DIAGNOSIS — E11.51 TYPE II DIABETES MELLITUS WITH PERIPHERAL CIRCULATORY DISORDER (HCC): Primary | ICD-10-CM

## 2024-09-17 DIAGNOSIS — E66.09 OTHER OBESITY DUE TO EXCESS CALORIES: ICD-10-CM

## 2024-09-17 DIAGNOSIS — E11.42 DIABETIC POLYNEUROPATHY ASSOCIATED WITH TYPE 2 DIABETES MELLITUS (HCC): ICD-10-CM

## 2024-09-17 DIAGNOSIS — I10 PRIMARY HYPERTENSION: ICD-10-CM

## 2024-09-17 DIAGNOSIS — Z12.11 SCREEN FOR COLON CANCER: ICD-10-CM

## 2024-09-17 PROCEDURE — G2211 COMPLEX E/M VISIT ADD ON: HCPCS

## 2024-09-17 PROCEDURE — 99214 OFFICE O/P EST MOD 30 MIN: CPT

## 2024-09-17 NOTE — ASSESSMENT & PLAN NOTE
Offered to start multiple different medications today and patient declined.  Concerned with cost and kidney function.  Educated on sequelae of uncontrolled diabetes.  Patient understands this.  Educated on healthy diet and activity.  Continue follow-up with clinical pharmacy.  Declines foot exam.  Lab Results   Component Value Date    HGBA1C 9.2 (H) 09/10/2024

## 2024-09-17 NOTE — ASSESSMENT & PLAN NOTE
Offered to start multiple different medications today and patient declined.  Concerned with cost and kidney function.  Educated on sequelae of uncontrolled diabetes.  Patient understands this.  Educated on healthy diet and activity.  Continue follow-up with clinical pharmacy.  Lab Results   Component Value Date    HGBA1C 9.2 (H) 09/10/2024       Orders:    Hemoglobin A1C; Standing    Comprehensive metabolic panel; Standing    Lipid panel; Standing    CBC and differential; Standing

## 2024-09-17 NOTE — ASSESSMENT & PLAN NOTE
Continue current.  Will continue monitoring.  Continue follow-up with nephrology.  Avoid nephrotoxic agents.  Lab Results   Component Value Date    EGFR 55 09/10/2024    EGFR 53 07/01/2024    EGFR 55 03/18/2024    CREATININE 1.35 (H) 09/10/2024    CREATININE 1.40 (H) 07/01/2024    CREATININE 1.35 (H) 03/18/2024       Orders:    Comprehensive metabolic panel; Standing

## 2024-09-17 NOTE — ASSESSMENT & PLAN NOTE
Will continue to monitor.  At goal.  Continue current management.  Educated on healthy diet and activity.  Orders:    Lipid panel; Standing

## 2024-09-17 NOTE — PROGRESS NOTES
Ambulatory Visit  Name: Jerald Calvin      : 1960      MRN: [id number]  Encounter Provider: Chip Amezcua PA-C  Encounter Date: 2024   Encounter department: [facility]    Assessment & Plan  Type II diabetes mellitus with peripheral circulatory disorder (HCC)  Offered to start multiple different medications today and patient declined.  Concerned with cost and kidney function.  Educated on sequelae of uncontrolled diabetes.  Patient understands this.  Educated on healthy diet and activity.  Continue follow-up with clinical pharmacy.  Lab Results   Component Value Date    HGBA1C 9.2 (H) 09/10/2024       Orders:    Hemoglobin A1C; Standing    Comprehensive metabolic panel; Standing    Lipid panel; Standing    CBC and differential; Standing    Primary hypertension  At goal today.  Continue current management.       Diabetic polyneuropathy associated with type 2 diabetes mellitus (HCC)  Offered to start multiple different medications today and patient declined.  Concerned with cost and kidney function.  Educated on sequelae of uncontrolled diabetes.  Patient understands this.  Educated on healthy diet and activity.  Continue follow-up with clinical pharmacy.  Declines foot exam.  Lab Results   Component Value Date    HGBA1C 9.2 (H) 09/10/2024            Stage 3a chronic kidney disease (HCC)  Continue current.  Will continue monitoring.  Continue follow-up with nephrology.  Avoid nephrotoxic agents.  Lab Results   Component Value Date    EGFR 55 09/10/2024    EGFR 53 2024    EGFR 55 2024    CREATININE 1.35 (H) 09/10/2024    CREATININE 1.40 (H) 2024    CREATININE 1.35 (H) 2024       Orders:    Comprehensive metabolic panel; Standing    Mixed hyperlipidemia  Will continue to monitor.  At goal.  Continue current management.  Educated on healthy diet and activity.  Orders:    Lipid panel; Standing    Obesity, morbid (HCC)  Educated on healthy diet and  activity.       Screen for colon cancer  Declines.  Risk/benefits/options discussed.  Contact office if you would like to complete this.       Other obesity due to excess calories    Orders:    CBC and differential; Standing       History of Present Illness     Patient is a 64-year-old male presenting for follow-up.  Patient had lab work since last visit.  Completed a week ago.  A1c came up to 9.2.  Rest of lab work was stable.        History obtained from : patient  Review of Systems   Constitutional:  Negative for appetite change, chills, diaphoresis, fatigue and fever.   HENT:  Negative for congestion, ear discharge, ear pain, postnasal drip, rhinorrhea, sinus pressure, sinus pain, sneezing and sore throat.    Eyes:  Negative for pain, discharge, redness, itching and visual disturbance.   Respiratory:  Negative for apnea, cough, chest tightness, shortness of breath and wheezing.    Cardiovascular:  Negative for chest pain, palpitations and leg swelling.   Gastrointestinal:  Negative for abdominal pain, blood in stool, constipation, diarrhea, nausea and vomiting.   Endocrine: Negative for cold intolerance, heat intolerance, polydipsia and polyuria.   Genitourinary:  Negative for dysuria, flank pain, frequency, hematuria and urgency.   Musculoskeletal:  Negative for arthralgias, back pain, myalgias, neck pain and neck stiffness.   Skin:  Negative for color change and rash.   Allergic/Immunologic: Negative.    Neurological:  Negative for dizziness, tremors, seizures, syncope, facial asymmetry, speech difficulty, weakness, light-headedness, numbness and headaches.   Hematological:  Negative for adenopathy. Does not bruise/bleed easily.   Psychiatric/Behavioral:  Negative for agitation, confusion, decreased concentration, dysphoric mood, hallucinations, self-injury, sleep disturbance and suicidal ideas. The patient is not nervous/anxious and is not hyperactive.    All other systems reviewed and are  negative.    Medical History Reviewed by provider this encounter:  Tobacco  Allergies  Meds  Problems  Med Hx  Surg Hx  Fam Hx       Current Outpatient Medications on File Prior to Visit   Medication Sig Dispense Refill    ascorbic acid (VITAMIN C) 1000 MG tablet Take 2,000 mg by mouth      atorvastatin (LIPITOR) 40 mg tablet take 1 tablet by mouth once daily 90 tablet 1    b complex vitamins capsule Take 1 capsule by mouth daily      Cholecalciferol 25 MCG (1000 UT) capsule Take 2,000 Units by mouth daily      cyanocobalamin 1,000 mcg/mL inject 1 milliliter ( 1000 MCG ) intramuscularly Every Month 10 mL 5    dapagliflozin (Farxiga) 10 MG tablet Take 1 tablet (10 mg total) by mouth daily 90 tablet 3    diphenhydrAMINE-acetaminophen (TYLENOL PM)  MG TABS Take 2 tablets by mouth daily at bedtime as needed for sleep      gabapentin (NEURONTIN) 300 mg capsule Take 2 capsules (600 mg total) by mouth 3 (three) times a day 540 capsule 1    ibuprofen (MOTRIN) 800 mg tablet take 1 tablet by mouth every 6 hours if needed for mild pain 360 tablet 3    lisinopril (ZESTRIL) 10 mg tablet take 1 tablet by mouth once daily 90 tablet 1    NovoLIN 70/30 ReliOn (70-30) 100 UNIT/ML subcutaneous injection INJECT 75  UNITS SUBCUTANEOUSLY TWICE DAILY 50 mL 5    oxyCODONE (ROXICODONE) 15 mg immediate release tablet Take 1 tablet (15 mg total) by mouth every 4 (four) hours as needed for moderate pain Max Daily Amount: 90 mg 60 tablet 0    vitamin E, tocopherol, 1,000 units capsule Take 1,000 Units by mouth daily      Blood Glucose Monitoring Suppl (OneTouch Verio Flex System) w/Device KIT USE AS DIRECTED BY MD TESTING FOUR TIMES DAILY      glucose blood (OneTouch Verio) test strip CHECK BLOOD SUGAR FOUR TIMES DAILY 400 each 1    OneTouch Delica Lancets 33G MISC Check blood sugars four times daily. Please substitute with appropriate alternative as covered by patient's insurance. Dx: E11.65 400 each 3    SYRINGE-NEEDLE,  "DISP, 3 ML (B-D SYRINGE/NEEDLE 3CC/22GX1.5) 22G X 1-1/2\" 3 ML MISC use as directed 100 each 1     No current facility-administered medications on file prior to visit.      Social History     Tobacco Use    Smoking status: Former     Types: Cigarettes    Smokeless tobacco: Never   Vaping Use    Vaping status: Never Used   Substance and Sexual Activity    Alcohol use: Not Currently    Drug use: Never    Sexual activity: Yes     Partners: Female         Objective     /74 (BP Location: Left arm, Patient Position: Sitting)   Pulse 83   Temp 98 °F (36.7 °C) (Tympanic)   Ht 5' 11\" (1.803 m)   Wt 117 kg (258 lb 6.4 oz)   SpO2 96%   BMI 36.04 kg/m²     Physical Exam  Vitals and nursing note reviewed.   Constitutional:       General: He is not in acute distress.     Appearance: Normal appearance. He is well-developed. He is obese. He is not ill-appearing, toxic-appearing or diaphoretic.   HENT:      Head: Normocephalic and atraumatic.      Right Ear: Tympanic membrane normal.      Left Ear: Tympanic membrane normal.      Nose: Nose normal.      Mouth/Throat:      Mouth: Mucous membranes are moist.      Pharynx: Oropharynx is clear.   Eyes:      Extraocular Movements: Extraocular movements intact.      Conjunctiva/sclera: Conjunctivae normal.      Pupils: Pupils are equal, round, and reactive to light.   Cardiovascular:      Rate and Rhythm: Normal rate and regular rhythm.      Pulses: Normal pulses.      Heart sounds: Normal heart sounds. No murmur heard.  Pulmonary:      Effort: Pulmonary effort is normal. No respiratory distress.      Breath sounds: Normal breath sounds. No wheezing.   Chest:      Chest wall: No tenderness.   Abdominal:      General: Bowel sounds are normal.      Palpations: Abdomen is soft. There is no mass.      Tenderness: There is no abdominal tenderness.   Musculoskeletal:         General: No swelling or tenderness. Normal range of motion.      Cervical back: Normal range of motion and neck " supple. No tenderness.      Right lower leg: No edema.      Left lower leg: No edema.   Lymphadenopathy:      Cervical: No cervical adenopathy.   Skin:     General: Skin is warm and dry.      Capillary Refill: Capillary refill takes less than 2 seconds.      Findings: No erythema, lesion or rash.   Neurological:      General: No focal deficit present.      Mental Status: He is alert and oriented to person, place, and time. Mental status is at baseline.      Cranial Nerves: No cranial nerve deficit.      Motor: No weakness.      Coordination: Coordination normal.      Gait: Gait normal.   Psychiatric:         Mood and Affect: Mood normal.         Behavior: Behavior normal.         Thought Content: Thought content normal.         Judgment: Judgment normal.

## 2024-09-18 DIAGNOSIS — E11.51 TYPE II DIABETES MELLITUS WITH PERIPHERAL CIRCULATORY DISORDER (HCC): Primary | ICD-10-CM

## 2024-09-18 NOTE — TELEPHONE ENCOUNTER
[facility]  Sue Mclaughlin, Pharmacist    Jerald Calvin is a 64 y.o. male who was referred to the pharmacist for DM education and management, referred by Chip Amezcua PA-C .    Telemedicine consent  The patient was identified by name and date of birth. Jerald Calvin was informed that this is a telemedicine visit and that the visit is being conducted through Telephone.  My office door was closed. No one else was in the room.  He acknowledged consent and understanding of privacy and security of the video platform. The patient has agreed to participate and understands they can discontinue the visit at any time.    Assessment/ Plan     Type 2 Diabetes:  Goal A1c <7% based on ADA guidelines. Most recent A1c above goal  Lab Results   Component Value Date    HGBA1C 9.2 (H) 09/10/2024     Complications:  Microvascular complications: neuropathy, CKD   Macrovascular complications: peripheral circulatory disorder  Medication options  Hx CKD. On Farxiga which is indicated for renal benefits.   Future consideration-   He would also benefit from GLP-1 agonist as well for additional weight loss/ A1c lowering  Can consider restarting metformin. GFR has been stable above 45.   Medication Cost  Approved for Far[employer]iga's patient assistance program through 2024 calendar year    Changes to Medication Regimen:   If PCP is in agreement  Most recent A1c remains above goal despite improved glucose control on log the past few months.  He would benefit from GLP-1 agonist however cost has been a concern for him. Plan to send Ozempic to pharmacy to assess medication cost. No hx pancreatitis, Men 2 or MTC.    2. On Additional Therapies:  Statin: yes    ASA: yes   Neuropathy: Gabapentin 300 mg - 2 caps TID     Monitoring: testing 4x daily    Subjective     Medication Adherence/ Tolerability:  Novolin 70/30 mix vial-   Morning dose  If BG >200- Injects 100 units  If BG < 200-  Injects 50 units   Evening  dose  If <120 mg/dL- Injects 30 units   If BG> se he injects 55-60 units with evening snack (sandwich)  Dapagliflozin 10 mg daily    Medications Tried in Past:  Janumet- cost constraints   Metformin- d/c due to NEREYDA    2. Lifestyle:   Eating more salads. Most days having 2 meals and bedtime snack (ends up be 3 total meals)  Morning: drinking coffee with splenda and milk  12 PM Lunch- usually a salad but sometimes will have sandwich and iced tea or lemonade (sugar free)   5 PM Dinner - Usually pork chops with potatoes    Snack at 12 PM- bologna sandwich      3. Home monitoring devices  Glucometer: Yes,   CGM: No   BP monitor: unknown     Objective     Lab Results   Component Value Date    HGBA1C 9.2 (H) 09/10/2024    HGBA1C 9.1 (H) 04/24/2024    HGBA1C 9.6 (H) 03/18/2024         ASCVD Risk:  The ASCVD Risk score (Perla OATES, et al., 2019) failed to calculate for the following reasons:    The valid total cholesterol range is 130 to 320 mg/dL     Vitals:  There were no vitals filed for this visit.    Labs:    Lab Results   Component Value Date    SODIUM 136 09/10/2024    K 4.5 09/10/2024    EGFR 55 09/10/2024    CREATININE 1.35 (H) 09/10/2024    GLUF 166 (H) 09/10/2024    [id number] 611 03/21/2023    MICROALBCRE 255 (H) 07/01/2024         Pharmacist Tracking Tool     Pharmacist Tracking Tool  Reason For Outreach: Embedded Pharmacist  Demographics:  Intervention Method: Phone  Type of Intervention: Follow-Up  Topics Addressed: Diabetes  Pharmacologic Interventions: N/A  Non-Pharmacologic Interventions: Disease state education, Home Monitoring and Medication/Device education  Time:  Direct Patient Care:  10  mins  Care Coordination:  10  mins  Recommendation Recipient: N/A  Outcome: N/A

## 2024-09-19 ENCOUNTER — TELEPHONE (OUTPATIENT)
Dept: ADMINISTRATIVE | Facility: OTHER | Age: 64
End: 2024-09-19

## 2024-09-19 NOTE — TELEPHONE ENCOUNTER
Upon review of the In Basket request we were able to locate, review, and update the patient chart as requested for Diabetic Eye Exam.    Any additional questions or concerns should be emailed to the Practice Liaisons via the appropriate education email address, please do not reply via In Basket.    Thank you  SARAHY ZULETA MA   [facility]

## 2024-09-19 NOTE — TELEPHONE ENCOUNTER
----- Message from Jael VENEGAS sent at 9/18/2024  5:59 PM EDT -----  Regarding: Care Gap request  09/18/24 5:59 PM    Hello, our patient attached above has had Diabetic Eye Exam completed/performed. Please assist in updating the patient chart by pulling the document from the Media Tab. The date of service is 2021.     Thank you,  Jael Lau  [facility] PRIMARY [facility]

## 2024-09-20 ENCOUNTER — TELEPHONE (OUTPATIENT)
Age: 64
End: 2024-09-20

## 2024-09-20 NOTE — TELEPHONE ENCOUNTER
PA for (Ozempic, 0.25 or 0.5 MG/DOSE SUBMITTED     via    [x]CMM-KEY: [id number]  []Surescripts-Case ID #   []Faxed to plan   []Other website   []Phone call Case ID #     Office notes sent, clinical questions answered. Awaiting determination    Turnaround time for your insurance to make a decision on your Prior Authorization can take 7-21 business days.

## 2024-09-20 NOTE — TELEPHONE ENCOUNTER
PA for (Ozempic, 0.25 or 0.5 MG/DOSE) SUBMITTED     via    [x]CMM-KEY: [id number]  [][facility] [place] #   []Faxed to plan   []Other website   []Phone call Case ID #     Office notes sent, clinical questions answered. Awaiting determination    Turnaround time for your insurance to make a decision on your Prior Authorization can take 7-21 business days.

## 2024-09-21 NOTE — TELEPHONE ENCOUNTER
PA for OZEMPIC 0.25MG  APPROVED     Date(s) approved 7/20/24 /2025    Case # [id number]    Patient advised by          []MyChart Message  [x]Phone call   []LMOM  []L/M to call office as no active Communication consent on file  []Unable to leave detailed message as VM not approved on Communication consent       Pharmacy advised by    [x]Fax  [x]Phone call    Approval letter scanned into Media Yes

## 2024-09-25 ENCOUNTER — TELEPHONE (OUTPATIENT)
Dept: FAMILY MEDICINE CLINIC | Facility: CLINIC | Age: 64
End: 2024-09-25

## 2024-09-25 NOTE — TELEPHONE ENCOUNTER
Patient called stating the Ozempic cost through his insurance is ~$130/month. He cannot afford that co-pay. [facility] assistance program will only deliver med to PCP office and [facility] policy is that they cannot accept patient meds in office. Looked into trulicity and bydureon and neither program is accepting new applicants at this time. He is <66 yo so cannot apply for PACENET yet.     Removed ozempic from med list. Will address other medication options at follow up.

## 2024-09-26 LAB
LEFT EYE DIABETIC RETINOPATHY: POSITIVE
RIGHT EYE DIABETIC RETINOPATHY: POSITIVE

## 2024-10-02 ENCOUNTER — VBI (OUTPATIENT)
Dept: ADMINISTRATIVE | Facility: OTHER | Age: 64
End: 2024-10-02

## 2024-10-02 ENCOUNTER — TELEPHONE (OUTPATIENT)
Dept: FAMILY MEDICINE CLINIC | Facility: CLINIC | Age: 64
End: 2024-10-02

## 2024-10-02 NOTE — TELEPHONE ENCOUNTER
10/02/24 12:57 PM     Chart reviewed for CRC: Colonoscopy was/were not submitted to the patient's insurance.     Cathi Melgar MA   [facility]

## 2024-10-02 NOTE — TELEPHONE ENCOUNTER
Review pt's chart for dm eye exam. Looks like he just had this completed but we only got the last page of report so sent form to Dr Gray's office in [place]

## 2024-10-03 ENCOUNTER — APPOINTMENT (OUTPATIENT)
Dept: LAB | Facility: CLINIC | Age: 64
End: 2024-10-03
Payer: COMMERCIAL

## 2024-10-03 DIAGNOSIS — E66.09 OTHER OBESITY DUE TO EXCESS CALORIES: ICD-10-CM

## 2024-10-03 DIAGNOSIS — E11.8 TYPE 2 DIABETES MELLITUS WITH COMPLICATION (HCC): ICD-10-CM

## 2024-10-03 DIAGNOSIS — N18.31 STAGE 3A CHRONIC KIDNEY DISEASE (HCC): ICD-10-CM

## 2024-10-03 DIAGNOSIS — E78.2 MIXED HYPERLIPIDEMIA: ICD-10-CM

## 2024-10-03 DIAGNOSIS — E11.51 TYPE II DIABETES MELLITUS WITH PERIPHERAL CIRCULATORY DISORDER (HCC): ICD-10-CM

## 2024-10-03 LAB
ALBUMIN SERPL BCG-MCNC: 4.3 G/DL (ref 3.5–5)
ALP SERPL-CCNC: 52 U/L (ref 34–104)
ALT SERPL W P-5'-P-CCNC: 24 U/L (ref 7–52)
ANION GAP SERPL CALCULATED.3IONS-SCNC: 10 MMOL/L (ref 4–13)
AST SERPL W P-5'-P-CCNC: 22 U/L (ref 13–39)
BASOPHILS # BLD AUTO: 0.09 THOUSANDS/ΜL (ref 0–0.1)
BASOPHILS NFR BLD AUTO: 1 % (ref 0–1)
BILIRUB SERPL-MCNC: 0.39 MG/DL (ref 0.2–1)
BUN SERPL-MCNC: 27 MG/DL (ref 5–25)
CALCIUM SERPL-MCNC: 9.5 MG/DL (ref 8.4–10.2)
CHLORIDE SERPL-SCNC: 104 MMOL/L (ref 96–108)
CHOLEST SERPL-MCNC: 137 MG/DL
CO2 SERPL-SCNC: 25 MMOL/L (ref 21–32)
CREAT SERPL-MCNC: 1.39 MG/DL (ref 0.6–1.3)
EOSINOPHIL # BLD AUTO: 0.18 THOUSAND/ΜL (ref 0–0.61)
EOSINOPHIL NFR BLD AUTO: 3 % (ref 0–6)
ERYTHROCYTE [DISTWIDTH] IN BLOOD BY AUTOMATED COUNT: 12.8 % (ref 11.6–15.1)
EST. AVERAGE GLUCOSE BLD GHB EST-MCNC: 212 MG/DL
GFR SERPL CREATININE-BSD FRML MDRD: 53 ML/MIN/1.73SQ M
GLUCOSE P FAST SERPL-MCNC: 103 MG/DL (ref 65–99)
HBA1C MFR BLD: 9 %
HCT VFR BLD AUTO: 44.2 % (ref 36.5–49.3)
HDLC SERPL-MCNC: 60 MG/DL
HGB BLD-MCNC: 14.2 G/DL (ref 12–17)
IMM GRANULOCYTES # BLD AUTO: 0.02 THOUSAND/UL (ref 0–0.2)
IMM GRANULOCYTES NFR BLD AUTO: 0 % (ref 0–2)
LDLC SERPL CALC-MCNC: 64 MG/DL (ref 0–100)
LYMPHOCYTES # BLD AUTO: 2.23 THOUSANDS/ΜL (ref 0.6–4.47)
LYMPHOCYTES NFR BLD AUTO: 32 % (ref 14–44)
MCH RBC QN AUTO: 32.3 PG (ref 26.8–34.3)
MCHC RBC AUTO-ENTMCNC: 32.1 G/DL (ref 31.4–37.4)
MCV RBC AUTO: 101 FL (ref 82–98)
MONOCYTES # BLD AUTO: 0.7 THOUSAND/ΜL (ref 0.17–1.22)
MONOCYTES NFR BLD AUTO: 10 % (ref 4–12)
NEUTROPHILS # BLD AUTO: 3.86 THOUSANDS/ΜL (ref 1.85–7.62)
NEUTS SEG NFR BLD AUTO: 54 % (ref 43–75)
NONHDLC SERPL-MCNC: 77 MG/DL
NRBC BLD AUTO-RTO: 0 /100 WBCS
PLATELET # BLD AUTO: 242 THOUSANDS/UL (ref 149–390)
PMV BLD AUTO: 10.2 FL (ref 8.9–12.7)
POTASSIUM SERPL-SCNC: 4.6 MMOL/L (ref 3.5–5.3)
PROT SERPL-MCNC: 7.6 G/DL (ref 6.4–8.4)
RBC # BLD AUTO: 4.4 MILLION/UL (ref 3.88–5.62)
SODIUM SERPL-SCNC: 139 MMOL/L (ref 135–147)
TRIGL SERPL-MCNC: 67 MG/DL
WBC # BLD AUTO: 7.08 THOUSAND/UL (ref 4.31–10.16)

## 2024-10-03 PROCEDURE — 85025 COMPLETE CBC W/AUTO DIFF WBC: CPT

## 2024-10-03 PROCEDURE — 83036 HEMOGLOBIN GLYCOSYLATED A1C: CPT

## 2024-10-03 PROCEDURE — 36415 COLL VENOUS BLD VENIPUNCTURE: CPT

## 2024-10-03 PROCEDURE — 80053 COMPREHEN METABOLIC PANEL: CPT

## 2024-10-03 PROCEDURE — 80061 LIPID PANEL: CPT

## 2024-10-03 RX ORDER — GABAPENTIN 300 MG/1
600 CAPSULE ORAL 3 TIMES DAILY
Qty: 540 CAPSULE | Refills: 0 | Status: SHIPPED | OUTPATIENT
Start: 2024-10-03

## 2024-10-03 NOTE — TELEPHONE ENCOUNTER
Spoke to pt and told him he was given a courtesy refill of his Gabapentin and he needs to go for his bloodwork before and more refills will be given.  Pt agreed.

## 2024-10-03 NOTE — TELEPHONE ENCOUNTER
Pt called back stating he was told to go for lab work.  Pt confirmed it was fasting.  Pt is going for blood work

## 2024-10-04 ENCOUNTER — TELEPHONE (OUTPATIENT)
Dept: FAMILY MEDICINE CLINIC | Facility: CLINIC | Age: 64
End: 2024-10-04

## 2024-10-04 NOTE — TELEPHONE ENCOUNTER
----- Message from Robert Budinetz, MD sent at 10/4/2024 10:20 AM EDT -----  Labs are all stable.  He should continue the current course and follow-up with clinical pharmacy.  His A1c is gradually coming down

## 2024-10-08 ENCOUNTER — CLINICAL SUPPORT (OUTPATIENT)
Dept: FAMILY MEDICINE CLINIC | Facility: CLINIC | Age: 64
End: 2024-10-08

## 2024-10-08 DIAGNOSIS — E11.51 TYPE II DIABETES MELLITUS WITH PERIPHERAL CIRCULATORY DISORDER (HCC): Primary | ICD-10-CM

## 2024-10-08 NOTE — ASSESSMENT & PLAN NOTE
Lab Results   Component Value Date    HGBA1C 9.0 (H) 10/03/2024     Goal A1c <7% .   Complications:  Microvascular complications: CKD, neuropathy   Macrovascular complications: peripheral circulatory disorder   Current Diabetes Regimen:  Novolin mix 70/30  Farxiga 10 mg daily   Historical DM Meds (reason for discontinuation):  Metformin   Janumet (cost)  On Additional Therapies:  Statin:yes  ACEI/ARB: no    Assessment:Patient could not afford co-pay for Ozempic. [facility] will not [facility] to patient's  home therefore cannot enroll patient in program. There are no other programs accepting new applicants for GLP1s at this time. We discussed other medication options including metformin which he declined. We also discussed Tradjenta which he was open to if approved through an assistance program. Tradjenta does not require renal adjustment therefore would be the preferred DPP-4 inhibitor.     Interestingly, his home reported glucose readings do not line with up A1c. Based on A1c of 9% you would expect his fingerstick readings to average 212 mg/dL. His average for past 2 weeks with checking QID is 145 mg/dL. Chronic opioid use can falsely elevate A1c. I did recommend CGM device which can monitor 24 hour glucose readings but he declined stating he does not want to carry a separate device around with him. I do think this would be the best course to fully understand what is causing his A1c to be elevated since his fingerstick readings do not match. I can readdress with patient in future.     Changes to medication regimen:  Plan to initiate Tradjenta 5 mg daily which we will obtain through an assistance program. Med added to med list and will mail application to patient's home.     Orders:    linaGLIPtin 5 MG TABS; Take 5 mg by mouth daily with or without food

## 2024-10-08 NOTE — PROGRESS NOTES
[facility]  Sue Mclaughlin, Pharmacist    Assessment/ Plan     Assessment & Plan  Type II diabetes mellitus with peripheral circulatory disorder (HCC)    Lab Results   Component Value Date    HGBA1C 9.0 (H) 10/03/2024     Goal A1c <7% .   Complications:  Microvascular complications: CKD, neuropathy   Macrovascular complications: peripheral circulatory disorder   Current Diabetes Regimen:  Novolin mix 70/30  Farxiga 10 mg daily   Historical DM Meds (reason for discontinuation):  Metformin   Janumet (cost)  On Additional Therapies:  Statin:yes  ACEI/ARB: no    Assessment:Patient could not afford co-pay for Ozempic. Ozempic patient assistance program will not deliver to patient's  home therefore cannot enroll patient in program. There are no other programs accepting new applicants for GLP1s at this time. We discussed other medication options including metformin which he declined. We also discussed Tradjenta which he was open to if approved through an assistance program. Tradjenta does not require renal adjustment therefore would be the preferred DPP-4 inhibitor.     Interestingly, his home reported glucose readings do not line with up A1c. Based on A1c of 9% you would expect his fingerstick readings to average 212 mg/dL. His average for past 2 weeks with checking QID is 145 mg/dL. Chronic opioid use can falsely elevate A1c. I did recommend CGM device which can monitor 24 hour glucose readings but he declined stating he does not want to carry a separate device around with him. I do think this would be the best course to fully understand what is causing his A1c to be elevated since his fingerstick readings do not match. I can readdress with patient in future.     Changes to medication regimen:  Plan to initiate Tradjenta 5 mg daily which we will obtain through an assistance program. Med added to med list and will mail application to patient's home.     Orders:    linaGLIPtin 5 MG TABS; Take 5  "mg by mouth daily with or without food      Follow-up: 4 weeks     Subjective   HPI    Medication Adherence/ Tolerability/ Cost:  Novolin 70/30 mix vial-   Morning dose  If BG >200- Injects 100 units  If BG < 200-  Injects 50 units   Evening dose  If <120 mg/dL- Injects 30 units   If BG> se he injects 55-60 units with evening snack (sandwich)  Dapagliflozin 10 mg daily      Review of Systems     2. Lifestyle:       3. Home monitoring devices  Glucometer: Yes, Brand:   Continuous Glucose Monitor: No    Objective       Blood Sugar Readings  The patient is currently checking blood glucose 4 times per day. Patient reports with SMBG logs.    Total 14 day average: 145 mg/dL    Not eating/ fasting  (5:30 AM)  2nd meal  (~11:30 AM)  3rd meal  (5:30 PM) 4th meal  \"Bedtime snack\"  (~11 P     165 198 117 108   194 158 131 113   272 91  121   187 186 103 100   168 126 200 110   125 221 86 103   180 170 93 102   239 127 118 100   206 64 119 101   193 122 130 106   189 143 158 108   157 127 183 123   296 129 130 112   196 127 150 107   186 139           Average 197 142 132 108     ASCVD Risk:  The 10-year ASCVD risk score (Perla OATES, et al., 2019) is: 12.6%    Values used to calculate the score:      Age: 64 years      Sex: Male      Is Non- : No      Diabetic: Yes      Tobacco smoker: No      Systolic Blood Pressure: 112 mmHg      Is BP treated: Yes      HDL Cholesterol: 60 mg/dL      Total Cholesterol: 137 mg/dL     Vitals:  There were no vitals filed for this visit.    Eye Exam:    Lab Results   Component Value Date    LEFTDIABRET None 04/26/2021    RIGHTDIABRET None 04/26/2021       Labs:  Lab Results   Component Value Date    SODIUM 139 10/03/2024    K 4.6 10/03/2024    EGFR 53 10/03/2024    CREATININE 1.39 (H) 10/03/2024    GLUF 103 (H) 10/03/2024    [id number] 611 03/21/2023    MICROALBCRE 255 (H) 07/01/2024       Lab Results   Component Value Date    HGBA1C 9.0 (H) 10/03/2024    HGBA1C 9.2 (H) " 09/10/2024    HGBA1C 9.1 (H) 04/24/2024       Telemedicine consent  The patient was identified by name and date of birth. Jerald Calvin was informed that this is a telemedicine visit and that the visit is being conducted through Telephone.  My office door was closed. No one else was in the room.  He acknowledged consent and understanding of privacy and security of the video platform. The patient has agreed to participate and understands they can discontinue the visit at any time.    Pharmacist Tracking Tool     Pharmacist Tracking Tool  Reason For Outreach: Embedded Pharmacist  Demographics:  Intervention Method: Phone  Type of Intervention: Follow-Up  Topics Addressed: Diabetes  Pharmacologic Interventions: Medication Initiation  Non-Pharmacologic Interventions: Cost, Disease state education, Home Monitoring, and Medication/Device education  Time:  Direct Patient Care:  30  mins  Care Coordination:  15  mins  Recommendation Recipient: Patient/Caregiver and Provider  Outcome: Accepted

## 2024-10-17 ENCOUNTER — VBI (OUTPATIENT)
Dept: ADMINISTRATIVE | Facility: OTHER | Age: 64
End: 2024-10-17

## 2024-10-17 NOTE — TELEPHONE ENCOUNTER
10/17/24 12:06 PM     Chart reviewed for Diabetic Eye Exam ; nothing is submitted to the patient's insurance at this time.     Edward Sloan MA   [facility]

## 2024-11-05 ENCOUNTER — CLINICAL SUPPORT (OUTPATIENT)
Dept: FAMILY MEDICINE CLINIC | Facility: CLINIC | Age: 64
End: 2024-11-05

## 2024-11-05 DIAGNOSIS — E11.42 DIABETIC POLYNEUROPATHY ASSOCIATED WITH TYPE 2 DIABETES MELLITUS (HCC): ICD-10-CM

## 2024-11-05 PROCEDURE — PBNCHG PB NO CHARGE PLACEHOLDER: Performed by: PHARMACIST

## 2024-11-05 NOTE — ASSESSMENT & PLAN NOTE
Lab Results   Component Value Date    HGBA1C 9.0 (H) 10/03/2024      Goal A1c <7% .   Complications:  Microvascular complications: CKD, neuropathy   Macrovascular complications: peripheral circulatory disorder   Current Diabetes Regimen:  Novolin mix 70/30  Farxiga 10 mg daily   Historical DM Meds (reason for discontinuation):  Metformin   Janumet (cost)  On Additional Therapies:  Statin:yes  ACEI/ARB: no     Assessment: Patient states he did not get application for Tradjenta assistance program. I will send it to him again.      Interestingly, his home reported glucose readings do not line with up A1c. Based on A1c of 9% you would expect his fingerstick readings to average 212 mg/dL. His average for past 2 weeks with checking QID is 160 mg/dL. Last appts average was 145 mg/dL. A1c can be falsely elevated by chronic opioid use. Fructosamine ordered     Changes to medication regimen:  Plan to initiate Tradjenta 5 mg daily which we will obtain through an assistance program. Application mailed again to patient.

## 2024-11-05 NOTE — PROGRESS NOTES
"[facility]  Sue Mclaughlin, Pharmacist    Assessment/ Plan     Assessment & Plan  Diabetic polyneuropathy associated with type 2 diabetes mellitus (HCC)  Lab Results   Component Value Date    HGBA1C 9.0 (H) 10/03/2024      Goal A1c <7% .   Complications:  Microvascular complications: CKD, neuropathy   Macrovascular complications: peripheral circulatory disorder   Current Diabetes Regimen:  Novolin mix 70/30  Farxiga 10 mg daily   Historical DM Meds (reason for discontinuation):  Metformin   Janumet (cost)  On Additional Therapies:  Statin:yes  ACEI/ARB: no     Assessment: Patient states he did not get application for Tradjenta assistance program. I will send it to him again.      Interestingly, his home reported glucose readings do not line with up A1c. Based on A1c of 9% you would expect his fingerstick readings to average 212 mg/dL. His average for past 2 weeks with checking QID is 160 mg/dL. Last appts average was 145 mg/dL. A1c can be falsely elevated by chronic opioid use. Fructosamine ordered     Changes to medication regimen:  Plan to initiate Tradjenta 5 mg daily which we will obtain through an assistance program. Application mailed again to patient.       Follow-up: 5 weeks     Subjective   HPI    Medication Adherence/ Tolerability/ Cost:  Novolin 70/30 mix vial-   Morning dose  If BG >200- Injects 100 units  If BG < 200-  Injects 50 units   Evening dose  If <120 mg/dL- Injects 30 units   If BG> se he injects 55-60 units with evening snack (sandwich)  Dapagliflozin 10 mg daily      Review of Systems     2. Lifestyle:       3. Home monitoring devices  Glucometer: Yes, Brand:   Continuous Glucose Monitor: No    Objective       Blood Sugar Readings  The patient is currently checking blood glucose 4 times per day. Patient reports with SMBG logs.    Total 14 day average: 145 mg/dL    Not eating/ fasting  (5:30 AM)  2nd meal  (~11:30 AM)  3rd meal  (5:30 PM) 4th meal  \"Bedtime " "snack\"  (~11 P     146 154 175 109   211 111 167 104   159 185 252 127   181 123 231 112   242 262 221 136   326 189 124 104   155 130 180 110   150 161 176 112   139 243 121 105   152 143 199 121   193 176 145 113   183 160 90 107   126 223 373 125   151 225 90 106   Average 179 179 182 113     Total 163 mg/dL    ASCVD Risk:  The 10-year ASCVD risk score (Perla OATES, et al., 2019) is: 12.6%    Values used to calculate the score:      Age: 64 years      Sex: Male      Is Non- : No      Diabetic: Yes      Tobacco smoker: No      Systolic Blood Pressure: 112 mmHg      Is BP treated: Yes      HDL Cholesterol: 60 mg/dL      Total Cholesterol: 137 mg/dL     Vitals:  There were no vitals filed for this visit.    Eye Exam:    Lab Results   Component Value Date    LEFTDIABRET Positive 09/26/2024    RIGHTDIABRET Positive 09/26/2024       Labs:  Lab Results   Component Value Date    SODIUM 139 10/03/2024    K 4.6 10/03/2024    EGFR 53 10/03/2024    CREATININE 1.39 (H) 10/03/2024    GLUF 103 (H) 10/03/2024    [id number] 611 03/21/2023    MICROALBCRE 255 (H) 07/01/2024       Lab Results   Component Value Date    HGBA1C 9.0 (H) 10/03/2024    HGBA1C 9.2 (H) 09/10/2024    HGBA1C 9.1 (H) 04/24/2024       Telemedicine consent  The patient was identified by name and date of birth. Jerald Calvin was informed that this is a telemedicine visit and that the visit is being conducted through Telephone.  My office door was closed. No one else was in the room.  He acknowledged consent and understanding of privacy and security of the video platform. The patient has agreed to participate and understands they can discontinue the visit at any time.    Pharmacist Tracking Tool     Pharmacist Tracking Tool  Reason For Outreach: Embedded Pharmacist  Demographics:  Intervention Method: Phone  Type of Intervention: Follow-Up  Topics Addressed: Diabetes  Pharmacologic Interventions: Medication Initiation  Non-Pharmacologic " Interventions: Cost, Disease state education, Home Monitoring, Labs, and Medication/Device education  Time:  Direct Patient Care:  10  mins  Care Coordination:  15  mins  Recommendation Recipient: Patient/Caregiver and Provider  Outcome: Accepted

## 2024-11-14 ENCOUNTER — VBI (OUTPATIENT)
Dept: ADMINISTRATIVE | Facility: OTHER | Age: 64
End: 2024-11-14

## 2024-11-14 NOTE — TELEPHONE ENCOUNTER
11/14/24 4:22 PM     Chart reviewed for Hemoglobin A1c ; nothing is submitted to the patient's insurance at this time.     Edward Sloan MA   [facility]

## 2024-11-21 NOTE — PROGRESS NOTES
BMI Counseling: Body mass index is 36 26 kg/m²  The BMI is above normal  Nutrition recommendations include reducing portion sizes, decreasing overall calorie intake, 3-5 servings of fruits/vegetables daily, reducing fast food intake, consuming healthier snacks, decreasing soda and/or juice intake, moderation in carbohydrate intake, increasing intake of lean protein, reducing intake of saturated fat and trans fat and reducing intake of cholesterol  Exercise recommendations include moderate aerobic physical activity for 150 minutes/week, vigorous aerobic physical activity for 75 minutes/week, exercising 3-5 times per week, joining a gym and strength training exercises  Plan for transmetatarsal amputation tomorrow after revascularization today due to scheduling issues.  Please make n.p.o. at midnight tonight for possible OR tomorrow.      Joe Moreau MD  Orthopedic Trauma Surgery

## 2024-12-01 DIAGNOSIS — E78.5 HYPERLIPIDEMIA, UNSPECIFIED HYPERLIPIDEMIA TYPE: ICD-10-CM

## 2024-12-03 RX ORDER — ATORVASTATIN CALCIUM 40 MG/1
40 TABLET, FILM COATED ORAL DAILY
Qty: 90 TABLET | Refills: 1 | Status: SHIPPED | OUTPATIENT
Start: 2024-12-03

## 2024-12-05 DIAGNOSIS — E11.8 TYPE 2 DIABETES MELLITUS WITH COMPLICATION (HCC): ICD-10-CM

## 2024-12-05 RX ORDER — GABAPENTIN 300 MG/1
600 CAPSULE ORAL 3 TIMES DAILY
Qty: 540 CAPSULE | Refills: 0 | Status: SHIPPED | OUTPATIENT
Start: 2024-12-05

## 2024-12-05 NOTE — TELEPHONE ENCOUNTER
Reason for call:   [x] Refill   [] Prior Auth  [] Other:     Office:   [x] PCP/Provider - Chip Amezcua/Jose  [] Specialty/Provider -     Medication: Gabapentin    Dose/Frequency: 300 mg     Quantity: #540    Pharmacy: [address] [facility]    Does the patient have enough for 3 days?   [x] Yes   [] No - Send as HP to POD

## 2024-12-10 ENCOUNTER — CLINICAL SUPPORT (OUTPATIENT)
Dept: FAMILY MEDICINE CLINIC | Facility: CLINIC | Age: 64
End: 2024-12-10

## 2024-12-10 DIAGNOSIS — E11.51 TYPE II DIABETES MELLITUS WITH PERIPHERAL CIRCULATORY DISORDER (HCC): Primary | ICD-10-CM

## 2024-12-10 PROCEDURE — PBNCHG PB NO CHARGE PLACEHOLDER: Performed by: PHARMACIST

## 2024-12-10 NOTE — ASSESSMENT & PLAN NOTE
Lab Results   Component Value Date    HGBA1C 9.0 (H) 10/03/2024     Goal A1c <7% .   Complications:  Microvascular complications: CKD, neuropathy   Macrovascular complications: peripheral circulatory disorder   Current Diabetes Regimen:  Novolin mix 70/30  Farxiga 10 mg daily   Historical DM Meds (reason for discontinuation):  Metformin   Janumet (cost)  On Additional Therapies:  Statin:yes  ACEI/ARB: no     Assessment: Patient received application for Tradjenta but has not completed.     Fructosamine was ordered last visit due to discrepancy between home glucose readings and A1c. He will get along with other repeat labs next month.      Changes to medication regimen:  Plan to initiate Tradjenta 5 mg daily which we will obtain through an assistance program. Patient needs to complete application and drop off at office.    DM Health maintenance  Foot exam  A1c next visit with PCP

## 2024-12-10 NOTE — PROGRESS NOTES
"[facility] Clinical Pharmacy Services  Sue Mclaughlin, Pharmacist    Assessment/ Plan     Assessment & Plan  Type II diabetes mellitus with peripheral circulatory disorder (HCC)    Lab Results   Component Value Date    HGBA1C 9.0 (H) 10/03/2024     Goal A1c <7% .   Complications:  Microvascular complications: CKD, neuropathy   Macrovascular complications: peripheral circulatory disorder   Current Diabetes Regimen:  Novolin mix 70/30  Farxiga 10 mg daily   Historical DM Meds (reason for discontinuation):  Metformin   Janumet (cost)  On Additional Therapies:  Statin:yes  ACEI/ARB: no     Assessment: Patient received application for Tradjenta but has not completed.     Fructosamine was ordered last visit due to discrepancy between home glucose readings and A1c. He will get along with other repeat labs next month.      Changes to medication regimen:  Plan to initiate Tradjenta 5 mg daily which we will obtain through an assistance program. Patient needs to complete application and drop off at office.    DM Health maintenance  Foot exam  A1c next visit with PCP       Follow-up: 7 weeks     Subjective   HPI    Medication Adherence/ Tolerability/ Cost:  Novolin 70/30 mix vial-   Morning dose  If BG >200- Injects 100 units  If BG < 200-  Injects 50 units   Evening dose  If <120 mg/dL- Injects 30 units   If BG> se he injects 55-60 units with evening snack (sandwich)  Dapagliflozin 10 mg daily      Review of Systems     2. Lifestyle:       3. Home monitoring devices  Glucometer: Yes, Brand:   Continuous Glucose Monitor: No    Objective       Blood Sugar Readings  The patient is currently checking blood glucose 4 times per day. Patient reports with SMBG logs.    Total 14 day average: 145 mg/dL    Not eating/ fasting  (5:30 AM)  2nd meal  (~11:30 AM)  3rd meal  (5:30 PM) 4th meal  \"Bedtime snack\"  (~11 P     149 196 129 101   144 203 113 107   159 228 68 101   190 118 180 123   195 136 122 110   347 159 186 113   346 202 241 128 "   422 462 353 161   142 185 176 114   120 280 141 106   184 135 194 116   195 92 230 107   177 74 137 101   149 143 152 109   258 121 130 119   150 123 269 131   117 175 131 109   191 68 258 127   147 127 224 128   253 204 74 118   151 270 72 106   168 148 223 131   211 75 209 127   161 161 145 117   195 137 119 101   247 96 156 110   262 87 201 126   207 103 162 111   179 172 106 101   177 135 160 121   226 126 153 128   215 159 123 101   185 176 122 107   200.5758 159.8788 165.4242 115.6364                 Average 160.3788     ASCVD Risk:  The 10-year ASCVD risk score (Perla OATES, et al., 2019) is: 12.6%    Values used to calculate the score:      Age: 64 years      Sex: Male      Is Non- : No      Diabetic: Yes      Tobacco smoker: No      Systolic Blood Pressure: 112 mmHg      Is BP treated: Yes      HDL Cholesterol: 60 mg/dL      Total Cholesterol: 137 mg/dL     Vitals:  There were no vitals filed for this visit.    Eye Exam:    Lab Results   Component Value Date    LEFTDIABRET Positive 09/26/2024    RIGHTDIABRET Positive 09/26/2024       Labs:  Lab Results   Component Value Date    SODIUM 139 10/03/2024    K 4.6 10/03/2024    EGFR 53 10/03/2024    CREATININE 1.39 (H) 10/03/2024    GLUF 103 (H) 10/03/2024    UGAAIWWQ06 611 03/21/2023    MICROALBCRE 255 (H) 07/01/2024       Lab Results   Component Value Date    HGBA1C 9.0 (H) 10/03/2024    HGBA1C 9.2 (H) 09/10/2024    HGBA1C 9.1 (H) 04/24/2024       Telemedicine consent  The patient was identified by name and date of birth. Jerald Calvin was informed that this is a telemedicine visit and that the visit is being conducted through Telephone.  My office door was closed. No one else was in the room.  He acknowledged consent and understanding of privacy and security of the video platform. The patient has agreed to participate and understands they can discontinue the visit at any time.    Pharmacist Tracking Tool     Pharmacist Tracking  Tool  Reason For Outreach: Embedded Pharmacist  Demographics:  Intervention Method: Phone  Type of Intervention: Follow-Up  Topics Addressed: Diabetes  Pharmacologic Interventions: Medication Initiation  Non-Pharmacologic Interventions: Cost, Disease state education, Home Monitoring, Labs, and Medication/Device education  Time:  Direct Patient Care:  10  mins  Care Coordination:  15  mins  Recommendation Recipient: Patient/Caregiver and Provider  Outcome: Accepted

## 2024-12-29 ENCOUNTER — VBI (OUTPATIENT)
Dept: ADMINISTRATIVE | Facility: OTHER | Age: 64
End: 2024-12-29

## 2024-12-29 NOTE — TELEPHONE ENCOUNTER
12/29/24 10:53 AM     Chart reviewed for CRC: Colonoscopy was/were not submitted to the patient's insurance.     Elisha Chun MA   [facility]

## 2024-12-30 ENCOUNTER — APPOINTMENT (OUTPATIENT)
Dept: LAB | Facility: CLINIC | Age: 64
End: 2024-12-30
Payer: COMMERCIAL

## 2024-12-30 DIAGNOSIS — E11.51 TYPE II DIABETES MELLITUS WITH PERIPHERAL CIRCULATORY DISORDER (HCC): ICD-10-CM

## 2024-12-30 DIAGNOSIS — E55.9 AVITAMINOSIS D: ICD-10-CM

## 2024-12-30 DIAGNOSIS — N18.31 CHRONIC KIDNEY DISEASE (CKD) STAGE G3A/A1, MODERATELY DECREASED GLOMERULAR FILTRATION RATE (GFR) BETWEEN 45-59 ML/MIN/1.73 SQUARE METER AND ALBUMINURIA CREATININE RATIO LESS THAN 30 MG/G (HCC): ICD-10-CM

## 2024-12-30 DIAGNOSIS — E78.2 MIXED HYPERLIPIDEMIA: ICD-10-CM

## 2024-12-30 DIAGNOSIS — E66.09 OTHER OBESITY DUE TO EXCESS CALORIES: ICD-10-CM

## 2024-12-30 DIAGNOSIS — R80.9 PROTEINURIA, UNSPECIFIED TYPE: ICD-10-CM

## 2024-12-30 DIAGNOSIS — N18.31 STAGE 3A CHRONIC KIDNEY DISEASE (HCC): ICD-10-CM

## 2024-12-30 DIAGNOSIS — E11.42 DIABETIC POLYNEUROPATHY ASSOCIATED WITH TYPE 2 DIABETES MELLITUS (HCC): ICD-10-CM

## 2024-12-30 LAB
25(OH)D3 SERPL-MCNC: 39 NG/ML (ref 30–100)
ALBUMIN SERPL BCG-MCNC: 4.6 G/DL (ref 3.5–5)
ALP SERPL-CCNC: 59 U/L (ref 34–104)
ALT SERPL W P-5'-P-CCNC: 27 U/L (ref 7–52)
ANION GAP SERPL CALCULATED.3IONS-SCNC: 11 MMOL/L (ref 4–13)
AST SERPL W P-5'-P-CCNC: 22 U/L (ref 13–39)
BASOPHILS # BLD AUTO: 0.09 THOUSANDS/ΜL (ref 0–0.1)
BASOPHILS NFR BLD AUTO: 1 % (ref 0–1)
BILIRUB SERPL-MCNC: 0.39 MG/DL (ref 0.2–1)
BUN SERPL-MCNC: 28 MG/DL (ref 5–25)
CALCIUM SERPL-MCNC: 10 MG/DL (ref 8.4–10.2)
CHLORIDE SERPL-SCNC: 102 MMOL/L (ref 96–108)
CHOLEST SERPL-MCNC: 147 MG/DL (ref ?–200)
CO2 SERPL-SCNC: 23 MMOL/L (ref 21–32)
CREAT SERPL-MCNC: 1.72 MG/DL (ref 0.6–1.3)
CREAT UR-MCNC: 96 MG/DL
EOSINOPHIL # BLD AUTO: 0.19 THOUSAND/ΜL (ref 0–0.61)
EOSINOPHIL NFR BLD AUTO: 2 % (ref 0–6)
ERYTHROCYTE [DISTWIDTH] IN BLOOD BY AUTOMATED COUNT: 12.6 % (ref 11.6–15.1)
EST. AVERAGE GLUCOSE BLD GHB EST-MCNC: 229 MG/DL
GFR SERPL CREATININE-BSD FRML MDRD: 41 ML/MIN/1.73SQ M
GLUCOSE P FAST SERPL-MCNC: 165 MG/DL (ref 65–99)
HBA1C MFR BLD: 9.6 %
HCT VFR BLD AUTO: 44.1 % (ref 36.5–49.3)
HDLC SERPL-MCNC: 61 MG/DL
HGB BLD-MCNC: 14.3 G/DL (ref 12–17)
IMM GRANULOCYTES # BLD AUTO: 0.02 THOUSAND/UL (ref 0–0.2)
IMM GRANULOCYTES NFR BLD AUTO: 0 % (ref 0–2)
LDLC SERPL CALC-MCNC: 68 MG/DL (ref 0–100)
LYMPHOCYTES # BLD AUTO: 2.56 THOUSANDS/ΜL (ref 0.6–4.47)
LYMPHOCYTES NFR BLD AUTO: 33 % (ref 14–44)
MCH RBC QN AUTO: 31.5 PG (ref 26.8–34.3)
MCHC RBC AUTO-ENTMCNC: 32.4 G/DL (ref 31.4–37.4)
MCV RBC AUTO: 97 FL (ref 82–98)
MICROALBUMIN UR-MCNC: 171.2 MG/L
MICROALBUMIN/CREAT 24H UR: 178 MG/G CREATININE (ref 0–30)
MONOCYTES # BLD AUTO: 0.75 THOUSAND/ΜL (ref 0.17–1.22)
MONOCYTES NFR BLD AUTO: 10 % (ref 4–12)
NEUTROPHILS # BLD AUTO: 4.22 THOUSANDS/ΜL (ref 1.85–7.62)
NEUTS SEG NFR BLD AUTO: 54 % (ref 43–75)
NONHDLC SERPL-MCNC: 86 MG/DL
NRBC BLD AUTO-RTO: 0 /100 WBCS
PLATELET # BLD AUTO: 267 THOUSANDS/UL (ref 149–390)
PMV BLD AUTO: 10.2 FL (ref 8.9–12.7)
POTASSIUM SERPL-SCNC: 4.5 MMOL/L (ref 3.5–5.3)
PROT SERPL-MCNC: 7.7 G/DL (ref 6.4–8.4)
RBC # BLD AUTO: 4.54 MILLION/UL (ref 3.88–5.62)
SODIUM SERPL-SCNC: 136 MMOL/L (ref 135–147)
TRIGL SERPL-MCNC: 89 MG/DL (ref ?–150)
WBC # BLD AUTO: 7.83 THOUSAND/UL (ref 4.31–10.16)

## 2024-12-30 PROCEDURE — 82985 ASSAY OF GLYCATED PROTEIN: CPT

## 2024-12-30 PROCEDURE — 80061 LIPID PANEL: CPT

## 2024-12-30 PROCEDURE — 85025 COMPLETE CBC W/AUTO DIFF WBC: CPT

## 2024-12-30 PROCEDURE — 82570 ASSAY OF URINE CREATININE: CPT

## 2024-12-30 PROCEDURE — 83036 HEMOGLOBIN GLYCOSYLATED A1C: CPT

## 2024-12-30 PROCEDURE — 80053 COMPREHEN METABOLIC PANEL: CPT

## 2024-12-30 PROCEDURE — 82043 UR ALBUMIN QUANTITATIVE: CPT

## 2024-12-30 PROCEDURE — 36415 COLL VENOUS BLD VENIPUNCTURE: CPT

## 2024-12-30 PROCEDURE — 82306 VITAMIN D 25 HYDROXY: CPT

## 2024-12-31 LAB — FRUCTOSAMINE SERPL-SCNC: 378 UMOL/L (ref 0–285)

## 2025-01-02 ENCOUNTER — RESULTS FOLLOW-UP (OUTPATIENT)
Dept: FAMILY MEDICINE CLINIC | Facility: CLINIC | Age: 65
End: 2025-01-02

## 2025-01-02 DIAGNOSIS — N18.31 STAGE 3A CHRONIC KIDNEY DISEASE (HCC): Primary | ICD-10-CM

## 2025-01-02 DIAGNOSIS — E11.8 TYPE 2 DIABETES MELLITUS WITH COMPLICATION (HCC): ICD-10-CM

## 2025-01-02 RX ORDER — HUMAN INSULIN 100 [IU]/ML
INJECTION, SUSPENSION SUBCUTANEOUS
Qty: 50 ML | Refills: 5 | Status: SHIPPED | OUTPATIENT
Start: 2025-01-02

## 2025-01-02 NOTE — TELEPHONE ENCOUNTER
Patient called, states practice called. Upon chart review/messages, conveyed lab result review left  by MERCED Amezcua regarding urology concerns and suggestions. Patient expresses understanding, states he has an upcoming urology appointment 1/6/2024 w Dr. Conteh. Patient request medication refill of   NovoLIN 70/30 ReliOn (70-30) 100 UNIT/ML subcutaneous injection [[id number]] , to hold over until 1/22/2025. Please advise Patient at [phone], if any further questions.

## 2025-01-16 ENCOUNTER — RA CDI HCC (OUTPATIENT)
Dept: OTHER | Facility: HOSPITAL | Age: 65
End: 2025-01-16

## 2025-01-16 NOTE — PROGRESS NOTES
HCC coding opportunities          Chart Reviewed number of suggestions sent to Provider: 4    E11.22  E11.3293- see 9/26/24 eye exam  E11.42- see 6/30/23 foot exam with absent sensory exam  E11.65 - 12/30/24 labs A1c 9.6    Please review and document on all HCC diagnoses, using M.E.A.T. criteria, as risk scores reset with the New Year.     Patients Insurance     Medicare Insurance: [employer]

## 2025-01-22 ENCOUNTER — OFFICE VISIT (OUTPATIENT)
Dept: FAMILY MEDICINE CLINIC | Facility: CLINIC | Age: 65
End: 2025-01-22
Payer: COMMERCIAL

## 2025-01-22 VITALS
BODY MASS INDEX: 35.36 KG/M2 | HEART RATE: 92 BPM | TEMPERATURE: 98 F | WEIGHT: 252.6 LBS | OXYGEN SATURATION: 94 % | SYSTOLIC BLOOD PRESSURE: 102 MMHG | HEIGHT: 71 IN | DIASTOLIC BLOOD PRESSURE: 60 MMHG

## 2025-01-22 DIAGNOSIS — E66.01 OBESITY, MORBID (HCC): ICD-10-CM

## 2025-01-22 DIAGNOSIS — N18.31 STAGE 3A CHRONIC KIDNEY DISEASE (HCC): ICD-10-CM

## 2025-01-22 DIAGNOSIS — E11.42 DIABETIC POLYNEUROPATHY ASSOCIATED WITH TYPE 2 DIABETES MELLITUS (HCC): ICD-10-CM

## 2025-01-22 DIAGNOSIS — Z53.20 ANNUAL VISIT DECLINED: ICD-10-CM

## 2025-01-22 DIAGNOSIS — Z82.49 FAMILY HISTORY OF CAROTID ARTERY STENOSIS: ICD-10-CM

## 2025-01-22 DIAGNOSIS — Z12.11 SCREEN FOR COLON CANCER: ICD-10-CM

## 2025-01-22 DIAGNOSIS — E78.2 MIXED HYPERLIPIDEMIA: ICD-10-CM

## 2025-01-22 DIAGNOSIS — I10 PRIMARY HYPERTENSION: Primary | ICD-10-CM

## 2025-01-22 DIAGNOSIS — E11.51 TYPE II DIABETES MELLITUS WITH PERIPHERAL CIRCULATORY DISORDER (HCC): ICD-10-CM

## 2025-01-22 PROCEDURE — G2211 COMPLEX E/M VISIT ADD ON: HCPCS

## 2025-01-22 PROCEDURE — 99214 OFFICE O/P EST MOD 30 MIN: CPT

## 2025-01-22 RX ORDER — NAPROXEN 250 MG/1
250 TABLET ORAL
COMMUNITY

## 2025-01-22 RX ORDER — CHOLECALCIFEROL (VITAMIN D3) 50 MCG
1 TABLET ORAL DAILY
COMMUNITY
Start: 2024-11-16

## 2025-01-22 NOTE — ASSESSMENT & PLAN NOTE
Lab Results   Component Value Date    EGFR 41 12/30/2024    EGFR 53 10/03/2024    EGFR 55 09/10/2024    CREATININE 1.72 (H) 12/30/2024    CREATININE 1.39 (H) 10/03/2024    CREATININE 1.35 (H) 09/10/2024       Orders:    Comprehensive metabolic panel; Future

## 2025-01-22 NOTE — ASSESSMENT & PLAN NOTE
Lab Results   Component Value Date    HGBA1C 9.6 (H) 12/30/2024   Discussed various medication options in great deal with patient.  Patient declines any further medication.  Patient also declines foot exam.  States he gets this with Dr. Tigre Roy.  Will contact for results.  Patient educated thoroughly on sequelae of uncontrolled diabetes.  Contact office if you would like to discuss ways to improve his uncontrolled diabetes.  Educated on footcare and self foot exams.    Orders:    Hemoglobin A1C; Future    CBC and differential; Future    Comprehensive metabolic panel; Future    Lipid Panel with Direct LDL reflex; Future

## 2025-01-22 NOTE — PROGRESS NOTES
Name: Jerald Calvin      : 1960      MRN: [id number]  Encounter Provider: Chip Amezcua PA-C  Encounter Date: 2025   Encounter department: [facility]    Assessment & Plan  Medicare annual wellness visit, subsequent         Primary hypertension         Type II diabetes mellitus with peripheral circulatory disorder (HCC)    Lab Results   Component Value Date    HGBA1C 9.6 (H) 2024       Orders:    Hemoglobin A1C; Future    CBC and differential; Future    Comprehensive metabolic panel; Future    Lipid Panel with Direct LDL reflex; Future    Diabetic polyneuropathy associated with type 2 diabetes mellitus (HCC)    Lab Results   Component Value Date    HGBA1C 9.6 (H) 2024       Orders:    Hemoglobin A1C; Future    CBC and differential; Future    Comprehensive metabolic panel; Future    Lipid Panel with Direct LDL reflex; Future    Stage 3a chronic kidney disease (HCC)  Lab Results   Component Value Date    EGFR 41 2024    EGFR 53 10/03/2024    EGFR 55 09/10/2024    CREATININE 1.72 (H) 2024    CREATININE 1.39 (H) 10/03/2024    CREATININE 1.35 (H) 09/10/2024       Orders:    Comprehensive metabolic panel; Future    Mixed hyperlipidemia    Orders:    Lipid Panel with Direct LDL reflex; Future    Obesity, morbid (HCC)  {If prescribing weight loss medication, click here to fill out prior auth smartform and then hit F2 with this smartlist to insert prior auth documentation (Optional):[id number]}    Orders:    CBC and differential; Future    Screen for colon cancer            Preventive health issues were discussed with patient, and age appropriate screening tests were ordered as noted in patient's After Visit Summary. Personalized health advice and appropriate referrals for health education or preventive services given if needed, as noted in patient's After Visit Summary.    History of Present Illness   {?Quick Links Encounters * My Last Note * Last Note in  "Specialty * Snapshot * Since Last Visit * History :97496}  HPI   Patient Care Team:  Chip Amezcua PA-C as PCP - General (Family Medicine)  Conor Coker MD as PCP - Family Medicine (Family Medicine)  Sue Mclaughlin, Pharmacist as Pharmacist (Pharmacy)    Review of Systems  Medical History Reviewed by provider this encounter:       Annual Wellness Visit Questionnaire       Health Risk Assessment:   Patient rates overall health as good. Patient feels that their physical health rating is same.     PREVENTIVE SCREENINGS      Cardiovascular Screening:    General: Screening Not Indicated and History Lipid Disorder      Diabetes Screening:     General: Screening Not Indicated and History Diabetes      Abdominal Aortic Aneurysm (AAA) Screening:    Risk factors include: tobacco use        Lung Cancer Screening:     General: Screening Not Indicated      Hepatitis C Screening:    General: Screening Current    Social Drivers of Health      Received from [employer]     No results found.    Objective {?Quick Links Trend Vitals * Enter New Vitals * Results Review * Timeline (Adult) * Labs * Imaging * Cardiology * Procedures * Lung Cancer Screening * Surgical eConsent :58074}  /60 (BP Location: Left arm, Patient Position: Sitting)   Pulse 92   Temp 98 °F (36.7 °C) (Tympanic)   Ht 5' 11\" (1.803 m)   Wt 115 kg (252 lb 9.6 oz)   SpO2 94%   BMI 35.23 kg/m²     Physical Exam  {Administrative / Billing Section (Optional):62874}  "

## 2025-01-22 NOTE — ASSESSMENT & PLAN NOTE
Lab Results   Component Value Date    HGBA1C 9.6 (H) 12/30/2024   Discussed various medication options in great deal with patient.  Patient declines any further medication.  Patient also declines foot exam.  States he gets this with Dr. Tigre Roy.  Will contact for results.  Patient educated thoroughly on sequelae of uncontrolled diabetes.  Contact office if you would like to discuss ways to improve his uncontrolled diabetes.    Orders:    Hemoglobin A1C; Future    CBC and differential; Future    Comprehensive metabolic panel; Future    Lipid Panel with Direct LDL reflex; Future

## 2025-01-22 NOTE — ASSESSMENT & PLAN NOTE
Lab Results   Component Value Date    HGBA1C 9.6 (H) 12/30/2024       Orders:    Hemoglobin A1C; Future    CBC and differential; Future    Comprehensive metabolic panel; Future    Lipid Panel with Direct LDL reflex; Future

## 2025-01-22 NOTE — ASSESSMENT & PLAN NOTE
Lab Results   Component Value Date    EGFR 41 12/30/2024    EGFR 53 10/03/2024    EGFR 55 09/10/2024    CREATININE 1.72 (H) 12/30/2024    CREATININE 1.39 (H) 10/03/2024    CREATININE 1.35 (H) 09/10/2024   Repeat labs placed to evaluate kidney function with greater hydration.  Denies any NEREYDA symptoms.  Will continue to monitor.  Avoid nephrotoxic agents.    Orders:    Comprehensive metabolic panel; Future

## 2025-01-22 NOTE — ASSESSMENT & PLAN NOTE
Will continue to monitor.  Educated on healthy diet and activity.  Continue Lipitor.  Orders:    Lipid Panel with Direct LDL reflex; Future

## 2025-01-22 NOTE — PROGRESS NOTES
Name: Jerald Calvin      : 1960      MRN: [id number]  Encounter Provider: Chip Amezcua PA-C  Encounter Date: 2025   Encounter department: [facility]  :  Assessment & Plan  Primary hypertension  At goal today.  Continue lisinopril 10 mg daily.  Encouraged to take at home blood pressures and contact office if persistently elevated.       Annual visit declined  Patient declines Medicare annual wellness visit.  If he decides he would like to complete this he should contact office to schedule.       Type II diabetes mellitus with peripheral circulatory disorder (HCC)    Lab Results   Component Value Date    HGBA1C 9.6 (H) 2024   Discussed various medication options in great deal with patient.  Patient declines any further medication.  Patient also declines foot exam.  States he gets this with Dr. Tigre Roy.  Will contact for results.  Patient educated thoroughly on sequelae of uncontrolled diabetes.  Contact office if you would like to discuss ways to improve his uncontrolled diabetes.    Orders:    Hemoglobin A1C; Future    CBC and differential; Future    Comprehensive metabolic panel; Future    Lipid Panel with Direct LDL reflex; Future    Diabetic polyneuropathy associated with type 2 diabetes mellitus (HCC)    Lab Results   Component Value Date    HGBA1C 9.6 (H) 2024   Discussed various medication options in great deal with patient.  Patient declines any further medication.  Patient also declines foot exam.  States he gets this with Dr. Tigre Roy.  Will contact for results.  Patient educated thoroughly on sequelae of uncontrolled diabetes.  Contact office if you would like to discuss ways to improve his uncontrolled diabetes.  Educated on footcare and self foot exams.    Orders:    Hemoglobin A1C; Future    CBC and differential; Future    Comprehensive metabolic panel; Future    Lipid Panel with Direct LDL reflex; Future    Stage 3a chronic kidney disease  ([facility])  Lab Results   Component Value Date    EGFR 41 12/30/2024    EGFR 53 10/03/2024    EGFR 55 09/10/2024    CREATININE 1.72 (H) 12/30/2024    CREATININE 1.39 (H) 10/03/2024    CREATININE 1.35 (H) 09/10/2024   Repeat labs placed to evaluate kidney function with greater hydration.  Denies any NEREYDA symptoms.  Will continue to monitor.  Avoid nephrotoxic agents.    Orders:    Comprehensive metabolic panel; Future    Mixed hyperlipidemia  Will continue to monitor.  Educated on healthy diet and activity.  Continue Lipitor.  Orders:    Lipid Panel with Direct LDL reflex; Future    Obesity, morbid ([facility])  Educated on healthy diet and activity.  Orders:    CBC and differential; Future    Screen for colon cancer  Risk/benefits discussed.  Patient declines.  Contact office if you would like to complete.       Family history of carotid artery stenosis  Does have first-degree relative with significant carotid artery stenosis resulting in CVA.  Given patient's uncontrolled diabetes long-term, I recommended that he gets a carotid ultrasound at this point to evaluate his carotid arteries.  Patient declines this.  Risk/benefits of this discussed.  Contact office if you would like to complete.              History of Present Illness   Patient is a 64-year-old male presenting for follow-up.  Did try to complete annual wellness visit, but patient declines this.  No questions or concerns today.  Did have lab work since last visit that showed A1c is worsened.  Is following with clinical pharmacy.  Patient has declined medication changes in the past.      Review of Systems   Constitutional:  Negative for appetite change, chills, diaphoresis, fatigue and fever.   HENT:  Negative for congestion, ear discharge, ear pain, postnasal drip, rhinorrhea, sinus pressure, sinus pain, sneezing and sore throat.    Eyes:  Negative for pain, discharge, redness, itching and visual disturbance.   Respiratory:  Negative for apnea, cough, chest tightness,  "shortness of breath and wheezing.    Cardiovascular:  Negative for chest pain, palpitations and leg swelling.   Gastrointestinal:  Negative for abdominal pain, blood in stool, constipation, diarrhea, nausea and vomiting.   Endocrine: Negative for cold intolerance, heat intolerance, polydipsia and polyuria.   Genitourinary:  Negative for dysuria, flank pain, frequency, hematuria and urgency.   Musculoskeletal:  Negative for arthralgias, back pain, myalgias, neck pain and neck stiffness.   Skin:  Negative for color change and rash.   Allergic/Immunologic: Negative.    Neurological:  Negative for dizziness, tremors, seizures, syncope, facial asymmetry, speech difficulty, weakness, light-headedness, numbness and headaches.   Hematological:  Negative for adenopathy. Does not bruise/bleed easily.   Psychiatric/Behavioral:  Negative for agitation, confusion, decreased concentration, dysphoric mood, hallucinations, self-injury, sleep disturbance and suicidal ideas. The patient is not nervous/anxious and is not hyperactive.    All other systems reviewed and are negative.      Objective   /60 (BP Location: Left arm, Patient Position: Sitting)   Pulse 92   Temp 98 °F (36.7 °C) (Tympanic)   Ht 5' 11\" (1.803 m)   Wt 115 kg (252 lb 9.6 oz)   SpO2 94%   BMI 35.23 kg/m²      Physical Exam  Vitals and nursing note reviewed.   Constitutional:       General: He is not in acute distress.     Appearance: Normal appearance. He is well-developed. He is obese. He is not ill-appearing, toxic-appearing or diaphoretic.   HENT:      Head: Normocephalic and atraumatic.      Right Ear: Tympanic membrane normal.      Left Ear: Tympanic membrane normal.      Nose: Nose normal.      Mouth/Throat:      Mouth: Mucous membranes are moist.      Pharynx: Oropharynx is clear.   Eyes:      Extraocular Movements: Extraocular movements intact.      Conjunctiva/sclera: Conjunctivae normal.      Pupils: Pupils are equal, round, and reactive to " light.   Neck:      Vascular: No carotid bruit.   Cardiovascular:      Rate and Rhythm: Normal rate and regular rhythm.      Pulses: Normal pulses.      Heart sounds: Normal heart sounds. No murmur heard.  Pulmonary:      Effort: Pulmonary effort is normal. No respiratory distress.      Breath sounds: Normal breath sounds. No wheezing.   Chest:      Chest wall: No tenderness.   Abdominal:      General: Bowel sounds are normal.      Palpations: Abdomen is soft. There is no mass.      Tenderness: There is no abdominal tenderness.   Musculoskeletal:         General: No swelling or tenderness. Normal range of motion.      Cervical back: Normal range of motion and neck supple. No tenderness.      Right lower leg: No edema.      Left lower leg: No edema.   Lymphadenopathy:      Cervical: No cervical adenopathy.   Skin:     General: Skin is warm and dry.      Capillary Refill: Capillary refill takes less than 2 seconds.      Findings: No erythema or rash.   Neurological:      General: No focal deficit present.      Mental Status: He is alert and oriented to person, place, and time. Mental status is at baseline.      Cranial Nerves: No cranial nerve deficit.      Motor: No weakness.      Coordination: Coordination normal.      Gait: Gait normal.   Psychiatric:         Mood and Affect: Mood normal.         Behavior: Behavior normal.         Thought Content: Thought content normal.         Judgment: Judgment normal.

## 2025-01-23 DIAGNOSIS — E11.51 TYPE II DIABETES MELLITUS WITH PERIPHERAL CIRCULATORY DISORDER (HCC): ICD-10-CM

## 2025-01-23 RX ORDER — DAPAGLIFLOZIN 10 MG/1
10 TABLET, FILM COATED ORAL DAILY
Qty: 90 TABLET | Refills: 3 | Status: SHIPPED | OUTPATIENT
Start: 2025-01-23

## 2025-02-05 ENCOUNTER — TELEMEDICINE (OUTPATIENT)
Dept: FAMILY MEDICINE CLINIC | Facility: CLINIC | Age: 65
End: 2025-02-05

## 2025-02-05 DIAGNOSIS — E11.51 TYPE II DIABETES MELLITUS WITH PERIPHERAL CIRCULATORY DISORDER (HCC): Primary | ICD-10-CM

## 2025-02-05 PROCEDURE — PBNCHG PB NO CHARGE PLACEHOLDER: Performed by: PHARMACIST

## 2025-02-05 NOTE — PROGRESS NOTES
[facility] Clinical Pharmacy Services  Sue Mclaughlin, Pharmacist    Assessment/ Plan     Assessment & Plan  Type II diabetes mellitus with peripheral circulatory disorder (HCC)    Lab Results   Component Value Date    HGBA1C 9.6 (H) 12/30/2024     Goal A1c <7% .   Complications:  Microvascular complications: CKD, neuropathy   Macrovascular complications: peripheral circulatory disorder   Current Diabetes Regimen:  Novolin mix 70/30  Farxiga 10 mg daily   Historical DM Meds (reason for discontinuation):  Metformin   Janumet (cost)  On Additional Therapies:  Statin:yes  ACEI/ARB: no     Assessment:   Fructosamine and A1c are both elevated indicating uncontrolled diabetes despite home monitoring glucose readings appearing more controlled.  Morning fasting readings are elevated (190 mg/dL) however the remainder of the day his readings are averaging 126 mg/dL to 169 mg/dL.  The fact that he wakes up most mornings with readings into the 200s has me suspecting that he is having overnight hyperglycemia that we are not catching through his fingerstick readings.    He has an unusual regimen with his Novolin 70/30 mix.  He injects anywhere from 2-4 times per day based on if his blood sugar is higher than 160 mg/dL.  I have explained to him before that mixed insulin is supposed to be used at a fixed dose however he says that he was told to do it this way by his previous PCP and thus the way he has been doing it for years now.  Because of this sliding scale type of dosing regimen, he may or may not get basal insulin when he misses a dose.  This is likely contributing to why his fasting readings are more elevated.  I do think that he would benefit from splitting his Novolin 70/30 into a split basal-bolus regimen.  He is open to this if the Tradjenta does not help to control his blood sugars.  Ideally he would be on a GLP-1 agonist however medication cost through his insurance limits him and were not able to get Trulicity through  "an assistance program because they are not accepting new applicants (verified on program website today).    We once again had a long discussion regarding diabetes control and goals of therapy.  Patient says he does not want his blood sugars into the 90s because he feels cold when this happens.  I did remind him of chronic long-term complications from diabetes.  We are already seeing complications of his diabetes affecting his major organ systems and I encouraged improved glucose control to further prevent disease progression.    Changes to medication regimen:  Plan to initiate Tradjenta 5 mg daily which we will obtain through an assistance program. Application was faxed on 1/22/24. Awaiting approval     DM Health maintenance  Foot exam- patient declined PCP giving foot exam       Follow-up: 4 weeks     Subjective   HPI    Medication Adherence/ Tolerability/ Cost:  Novolin 70/30 mix vial-   He injects 2-4 times per day.  States he injects when his sugars are high which he defines as anything above 160 mg/dL he injects sometimes with food and sometimes without food.  It is unclear exactly how much insulin he is using during the day because he adjusts his dose based on what he feels he needs.  I did explain that that is not standard dosing especially for mixed insulin which is likely making it even more challenging for glucose control.  Dapagliflozin 10 mg daily      Review of Systems     2. Lifestyle:       3. Home monitoring devices  Glucometer: Yes, Brand:   Continuous Glucose Monitor: No- patient declines.    Objective       Blood Sugar Readings  The patient is currently checking blood glucose 4 times per day. Patient reports with SMBG logs.    Not eating/ fasting  (5:30 AM)  1st meal  (~11:30 AM)  2rd meal  (5:30 PM) 3rd meal  \"Bedtime snack\"  (~11 P     232 127 197 126   200 109 136 101   206 136 180 127   209 85 108 110   197 87 282 139   165 194 158 121   194 94 179 127   116 226 133 121   181 116 217 121   177 " 133 225 136   225 247 116 110   151 281 134 107   176 178 127 110   199 143 142 126   221 154 165 121   206 156 158 129   230 97 205 138   129 291 104 107   179 174 145 119   166 115 170 127   179 115 201 136   126 123 142 117   159 121 226 130   142 63 180 127   228 122 150 121   386 131 176 133   188 149 176 129   218 122 183 135   238 100 195 136   176 180 168 204   149 227 107 113   162 177 230 146   149 129 230 131   179 112 139 120   217 210 109 111   190 149 168 126       ASCVD Risk:  The 10-year ASCVD risk score (Perla OATES, et al., 2019) is: 11.2%    Values used to calculate the score:      Age: 64 years      Sex: Male      Is Non- : No      Diabetic: Yes      Tobacco smoker: No      Systolic Blood Pressure: 102 mmHg      Is BP treated: Yes      HDL Cholesterol: 61 mg/dL      Total Cholesterol: 147 mg/dL     Vitals:  There were no vitals filed for this visit.    Eye Exam:    Lab Results   Component Value Date    LEFTDIABRET Positive 09/26/2024    RIGHTDIABRET Positive 09/26/2024       Labs:  Lab Results   Component Value Date    SODIUM 136 12/30/2024    K 4.5 12/30/2024    EGFR 41 12/30/2024    CREATININE 1.72 (H) 12/30/2024    GLUF 165 (H) 12/30/2024    [id number] 611 03/21/2023    MICROALBCRE 178 (H) 12/30/2024       Lab Results   Component Value Date    HGBA1C 9.6 (H) 12/30/2024    HGBA1C 9.0 (H) 10/03/2024    HGBA1C 9.2 (H) 09/10/2024         Pharmacist Tracking Tool     Pharmacist Tracking Tool  Reason For Outreach: Embedded Pharmacist  Demographics:  Intervention Method: Phone  Type of Intervention: Follow-Up  Topics Addressed: Diabetes  Pharmacologic Interventions: Medication Initiation  Non-Pharmacologic Interventions: Cost, Disease state education, Home Monitoring, Labs, and Medication/Device education  Time:  Direct Patient Care:  20  mins  Care Coordination:  15  mins  Recommendation Recipient: Patient/Caregiver and Provider  Outcome: Accepted

## 2025-02-07 DIAGNOSIS — E11.8 TYPE 2 DIABETES MELLITUS WITH COMPLICATION (HCC): ICD-10-CM

## 2025-02-07 RX ORDER — BLOOD SUGAR DIAGNOSTIC
STRIP MISCELLANEOUS
Qty: 400 EACH | Refills: 1 | Status: SHIPPED | OUTPATIENT
Start: 2025-02-07

## 2025-02-07 NOTE — TELEPHONE ENCOUNTER
Reason for call:   [x] Refill   [] Prior Auth  [] Other:     Office:   [x] PCP/Provider - [facility]   [] Specialty/Provider -     Medication:   glucose blood (OneTouch Verio) test strip - CHECK BLOOD SUGAR FOUR TIMES DAILY     Pharmacy:   [id number] [place] [facility] [address]     Does the patient have enough for 3 days?   [x] Yes   [] No - Send as HP to POD

## 2025-02-12 ENCOUNTER — TELEPHONE (OUTPATIENT)
Age: 65
End: 2025-02-12

## 2025-02-12 NOTE — TELEPHONE ENCOUNTER
Mindy with [facility] pt assist program called confirming that she received a fax to help pt with his Tradgenta 5 mg.    Mindy reporting that pg# 10 of the fax (shows pg 5 of 5 on the bottom right corner of the page) scanned to pt's media on 1/22/25 is missing the directions. They cannot process w/o the directions.        Mindy requesting this be completed and faxed back.    Fax: [phone]

## 2025-02-27 ENCOUNTER — VBI (OUTPATIENT)
Dept: ADMINISTRATIVE | Facility: OTHER | Age: 65
End: 2025-02-27

## 2025-02-27 NOTE — TELEPHONE ENCOUNTER
02/27/25 2:53 PM     Chart reviewed for Hemoglobin A1c was/were not submitted to the patient's insurance.     Elisha Chun MA   [facility]

## 2025-03-04 DIAGNOSIS — E11.8 TYPE 2 DIABETES MELLITUS WITH COMPLICATION (HCC): ICD-10-CM

## 2025-03-04 DIAGNOSIS — I10 PRIMARY HYPERTENSION: ICD-10-CM

## 2025-03-04 DIAGNOSIS — E78.5 HYPERLIPIDEMIA, UNSPECIFIED HYPERLIPIDEMIA TYPE: ICD-10-CM

## 2025-03-04 DIAGNOSIS — N18.31 STAGE 3A CHRONIC KIDNEY DISEASE (HCC): ICD-10-CM

## 2025-03-04 NOTE — TELEPHONE ENCOUNTER
Added 2nd prescription to order    Reason for call:   [x] Refill   [] Prior Auth  [] Other:     Office:   [x] PCP/Provider - Chip Amezcua  [] Specialty/Provider -     Medication: Atorvastatin     Dose/Frequency: 40 mg Daily     Quantity: 90    Pharmacy: [facility]    Does the patient have enough for 3 days?   [x] Yes   [] No - Send as HP to POD

## 2025-03-05 ENCOUNTER — TELEMEDICINE (OUTPATIENT)
Dept: FAMILY MEDICINE CLINIC | Facility: CLINIC | Age: 65
End: 2025-03-05

## 2025-03-05 DIAGNOSIS — E11.51 TYPE II DIABETES MELLITUS WITH PERIPHERAL CIRCULATORY DISORDER (HCC): Primary | ICD-10-CM

## 2025-03-05 PROCEDURE — PBNCHG PB NO CHARGE PLACEHOLDER: Performed by: PHARMACIST

## 2025-03-05 RX ORDER — LISINOPRIL 10 MG/1
10 TABLET ORAL DAILY
Qty: 90 TABLET | Refills: 1 | Status: SHIPPED | OUTPATIENT
Start: 2025-03-05

## 2025-03-05 RX ORDER — ATORVASTATIN CALCIUM 40 MG/1
40 TABLET, FILM COATED ORAL DAILY
Qty: 90 TABLET | Refills: 1 | Status: SHIPPED | OUTPATIENT
Start: 2025-03-05

## 2025-03-05 NOTE — PROGRESS NOTES
[facility] Clinical Pharmacy Services  Sue Mclaughlin, Pharmacist    Assessment/ Plan     Assessment & Plan  Type II diabetes mellitus with peripheral circulatory disorder (HCC)    Lab Results   Component Value Date    HGBA1C 9.6 (H) 12/30/2024     Goal A1c <7% .   On Additional Therapies:  Statin:yes  ACEI/ARB: no     Assessment:   A1c above goal. Glucose readings elevated in AM. He newly started Tradjenta.     Changes to medication regimen:  Novolin mix 70/30: Continue   Farxiga: Continue 10 mg daily   Tradjenta: Continue 5 mg daily     DM Health maintenance  Foot exam- patient declined PCP giving foot exam       Follow-up: 6 weeks     Subjective   HPI:  Appointment today is for follow up of Type 2 DM. His A1c is uncontrolled. DM related complications include CKD, neuropathy, and peripheral circulatory disorder He has an unusual regimen with his Novolin 70/30 mix.  He injects anywhere from 2-4 times per day based on if his blood sugar is higher than 160 mg/dL.  I have explained to him before that mixed insulin is supposed to be used at a fixed dose however he says that he was told to do it this way by his previous PCP and thus the way he has been doing it for years now.  Because of this sliding scale type of dosing regimen, he may or may not get basal insulin when he misses a dose.  This is likely contributing to why his fasting readings are more elevated.  I do think that he would benefit from splitting his Novolin 70/30 into a split basal-bolus regimen but he declined last visit. He newly started Tradjenta 2 weeks ago.        Medication Adherence/ Tolerability/ Cost:  Novolin 70/30 mix vial-   He injects 2-4 times per day.  States he injects when his sugars are high which he defines as anything above 160 mg/dL he injects sometimes with food and sometimes without food.  It is unclear exactly how much insulin he is using during the day because he adjusts his dose based on what he feels he needs.  I did explain  "that that is not standard dosing especially for mixed insulin which is likely making it even more challenging for glucose control.  Dapagliflozin 10 mg daily  Tradjenta 5 mg- newly started on 2 weeks ago. No side effects so far    Previous medications (reason for discontinuation):  Metformin (kidney function)      Review of Systems     2. Lifestyle:       3. Home monitoring devices  Glucometer: Yes, Brand:   Continuous Glucose Monitor: No- patient declines.    Objective       Blood Sugar Readings  The patient is currently checking blood glucose 4 times per day. Patient reports with SMBG logs.    Not eating/ fasting  (5:30 AM)  1st meal  (~11:30 AM)  2rd meal  (5:30 PM) 3rd meal  \"Bedtime snack\"  (~11 P     203 172 169 121   173 162 156 137   206 74 151 129   182 139 159 136   160 150 166 139   209 150 170 131   195 136 200 151   199 101 165 127   157 199 82 109   195 124 145 136   227 100 433 250   201 120 124 110   176 109 178 139   174 148 178 131   230 99 167 126   192 132 176 138       ASCVD Risk:  The 10-year ASCVD risk score (Perla OATES, et al., 2019) is: 11.2%    Values used to calculate the score:      Age: 64 years      Sex: Male      Is Non- : No      Diabetic: Yes      Tobacco smoker: No      Systolic Blood Pressure: 102 mmHg      Is BP treated: Yes      HDL Cholesterol: 61 mg/dL      Total Cholesterol: 147 mg/dL     Vitals:  There were no vitals filed for this visit.    Eye Exam:    Lab Results   Component Value Date    LEFTDIABRET Positive 09/26/2024    RIGHTDIABRET Positive 09/26/2024       Labs:  Lab Results   Component Value Date    SODIUM 136 12/30/2024    K 4.5 12/30/2024    EGFR 41 12/30/2024    CREATININE 1.72 (H) 12/30/2024    GLUF 165 (H) 12/30/2024    [id number] 611 03/21/2023    MICROALBCRE 178 (H) 12/30/2024       Lab Results   Component Value Date    HGBA1C 9.6 (H) 12/30/2024    HGBA1C 9.0 (H) 10/03/2024    HGBA1C 9.2 (H) 09/10/2024         Pharmacist Tracking Tool "     Pharmacist Tracking Tool  Reason For Outreach: Embedded Pharmacist  Demographics:  Intervention Method: Phone  Type of Intervention: Follow-Up  Topics Addressed: Diabetes  Pharmacologic Interventions: Med Rec  Non-Pharmacologic Interventions: Disease state education, Home Monitoring, Labs, and Medication/Device education  Time:  Direct Patient Care:  20  mins  Care Coordination:  15  mins  Recommendation Recipient: Patient/Caregiver and Provider  Outcome: Accepted

## 2025-03-05 NOTE — ASSESSMENT & PLAN NOTE
Lab Results   Component Value Date    HGBA1C 9.6 (H) 12/30/2024     Goal A1c <7% .   On Additional Therapies:  Statin:yes  ACEI/ARB: no     Assessment:   A1c above goal. Glucose readings elevated in AM. He newly started Tradjenta.     Changes to medication regimen:  Novolin mix 70/30: Continue   Farxiga: Continue 10 mg daily   Tradjenta: Continue 5 mg daily     DM Health maintenance  Foot exam- patient declined PCP giving foot exam

## 2025-03-06 DIAGNOSIS — E11.8 TYPE 2 DIABETES MELLITUS WITH COMPLICATION (HCC): ICD-10-CM

## 2025-03-07 RX ORDER — GABAPENTIN 300 MG/1
600 CAPSULE ORAL 3 TIMES DAILY
Qty: 540 CAPSULE | Refills: 1 | Status: SHIPPED | OUTPATIENT
Start: 2025-03-07

## 2025-03-25 ENCOUNTER — VBI (OUTPATIENT)
Dept: ADMINISTRATIVE | Facility: OTHER | Age: 65
End: 2025-03-25

## 2025-03-25 NOTE — TELEPHONE ENCOUNTER
03/25/25 11:00 AM     Chart reviewed for CRC: Colonoscopy ; nothing is submitted to the patient's insurance at this time.     Elisha Chun MA   [facility]

## 2025-04-15 ENCOUNTER — DOCUMENTATION (OUTPATIENT)
Dept: ADMINISTRATIVE | Facility: OTHER | Age: 65
End: 2025-04-15

## 2025-04-15 NOTE — PROGRESS NOTES
04/15/25 1:44 PM    Annual Wellness Visit outreach is not required, patient seen in last 3 months.     Thank you.  Edward Sloan MA  [facility]

## 2025-04-23 ENCOUNTER — TELEMEDICINE (OUTPATIENT)
Dept: FAMILY MEDICINE CLINIC | Facility: CLINIC | Age: 65
End: 2025-04-23

## 2025-04-23 DIAGNOSIS — E11.51 TYPE II DIABETES MELLITUS WITH PERIPHERAL CIRCULATORY DISORDER (HCC): Primary | ICD-10-CM

## 2025-04-23 PROCEDURE — PBNCHG PB NO CHARGE PLACEHOLDER: Performed by: PHARMACIST

## 2025-04-23 NOTE — PROGRESS NOTES
[facility] Clinical Pharmacy Services  Sue Mclaughlin, Pharmacist    Assessment/ Plan     Assessment & Plan  Type II diabetes mellitus with peripheral circulatory disorder (HCC)    Lab Results   Component Value Date    HGBA1C 9.6 (H) 12/30/2024     Goal A1c <7% .   On Additional Therapies:  Statin:yes  ACEI/ARB: no     Assessment:   A1c above goal. Glucose readings appear to be more controlled. Elevations do occur when he skips his injection because his glucose readings are controlled. We discussed how even when his readings are controlled he needs to use his insulin because it results in hyperglycemia when he skips it.      He continues to tolerate Trajenta without issues.     Reminded him to get repeat labs completed including A1c    Changes to medication regimen:  Novolin mix 70/30: Continue   Farxiga: Continue 10 mg daily   Tradjenta: Continue 5 mg daily     DM Health maintenance  Foot exam- patient declined PCP giving foot exam          Follow-up: 8 weeks     Subjective   HPI:  Appointment today is for follow up of Type 2 DM. His A1c is uncontrolled. DM related complications include CKD, neuropathy, and peripheral circulatory disorder He has an unusual regimen with his Novolin 70/30 mix.  He injects anywhere from 2-4 times per day based on if his blood sugar is higher than 160 mg/dL.  I have explained to him before that mixed insulin is supposed to be used at a fixed dose however he says that he was told to do it this way by his previous PCP and thus the way he has been doing it for years now.  Because of this sliding scale type of dosing regimen, he may or may not get basal insulin when he misses a dose.  I  do think that he would benefit from splitting his Novolin 70/30 into a split basal-bolus regimen but he declined.        Medication Adherence/ Tolerability/ Cost:  Novolin 70/30 mix vial-   If > 200 mg/dL he will inject anywhere from 85 and 100 units   If 150 - 200 mg/dL: 50 units   If < 150 mg/dL he will  "skip his injection  Injects between 2-3 injections per day  Dapagliflozin 10 mg daily  Tradjenta 5 mg    Previous medications (reason for discontinuation):  Metformin (kidney function)      Review of Systems     2. Lifestyle:       3. Home monitoring devices  Glucometer: Yes, Brand:   Continuous Glucose Monitor: No- patient declines.    Objective       Blood Sugar Readings  The patient is currently checking blood glucose 4 times per day. Patient reports with SMBG logs.    Not eating/ fasting  (5:30 AM)  1st meal  (~11:30 AM)  2rd meal  (5:30 PM) 3rd meal  \"Bedtime snack\"  (~11 P     179 142 252 136   84 172 110 121   179 152 185 126   184 185 145 119   201 75 138 110   162 244 124 115   183 185 138 118   160 176 134 127   190 75 120 111   175 127 357 136   156 169 170 123   123 161 129 110   174 146 194 136   143 143 223 138   118 225 144 126   189 162 132 114   131 273 88 110   131 200 105 118   155 116 230 127   139 195 129 120   238 91 223 121   84 235 81 110   158 165 161 121       ASCVD Risk:  The 10-year ASCVD risk score (Perla OATES, et al., 2019) is: 11.2%    Values used to calculate the score:      Age: 64 years      Sex: Male      Is Non- : No      Diabetic: Yes      Tobacco smoker: No      Systolic Blood Pressure: 102 mmHg      Is BP treated: Yes      HDL Cholesterol: 61 mg/dL      Total Cholesterol: 147 mg/dL     Vitals:  There were no vitals filed for this visit.    Eye Exam:    Lab Results   Component Value Date    LEFTDIABRET Positive 09/26/2024    RIGHTDIABRET Positive 09/26/2024       Labs:  Lab Results   Component Value Date    SODIUM 136 12/30/2024    K 4.5 12/30/2024    EGFR 41 12/30/2024    CREATININE 1.72 (H) 12/30/2024    GLUF 165 (H) 12/30/2024    [id number] 611 03/21/2023    MICROALBCRE 178 (H) 12/30/2024       Lab Results   Component Value Date    HGBA1C 9.6 (H) 12/30/2024    HGBA1C 9.0 (H) 10/03/2024    HGBA1C 9.2 (H) 09/10/2024         Pharmacist Tracking Tool "     Pharmacist Tracking Tool  Reason For Outreach: Embedded Pharmacist  Demographics:  Intervention Method: Phone  Type of Intervention: Follow-Up  Topics Addressed: Diabetes  Pharmacologic Interventions: Med Rec  Non-Pharmacologic Interventions: Disease state education, Home Monitoring, Labs, and Medication/Device education  Time:  Direct Patient Care:  20  mins  Care Coordination:  15  mins  Recommendation Recipient: Patient/Caregiver and Provider  Outcome: Accepted

## 2025-04-23 NOTE — ASSESSMENT & PLAN NOTE
Lab Results   Component Value Date    HGBA1C 9.6 (H) 12/30/2024     Goal A1c <7% .   On Additional Therapies:  Statin:yes  ACEI/ARB: no     Assessment:   A1c above goal. Glucose readings appear to be more controlled. Elevations do occur when he skips his injection because his glucose readings are controlled. We discussed how even when his readings are controlled he needs to use his insulin because it results in hyperglycemia when he skips it.      He continues to tolerate Trajenta without issues.     Reminded him to get repeat labs completed including A1c    Changes to medication regimen:  Novolin mix 70/30: Continue   Farxiga: Continue 10 mg daily   Tradjenta: Continue 5 mg daily     DM Health maintenance  Foot exam- patient declined PCP giving foot exam

## 2025-05-02 ENCOUNTER — TELEPHONE (OUTPATIENT)
Age: 65
End: 2025-05-02

## 2025-05-02 NOTE — TELEPHONE ENCOUNTER
Patient called and is asking if Sue might be able to call in for his Tradjenta to see if they will be filling the medication automatically or if he needs to call them. Patient stated unfortunately he does not have their number and patient was only able to provide the medication name at this time. Please advise.

## 2025-05-02 NOTE — TELEPHONE ENCOUNTER
Informed patient the Tradjenta has refills through the patient assistance program. Provided him with the phone number to call program directly for refills.     Phone: [phone]

## 2025-05-16 ENCOUNTER — TELEPHONE (OUTPATIENT)
Dept: FAMILY MEDICINE CLINIC | Facility: CLINIC | Age: 65
End: 2025-05-16

## 2025-05-16 NOTE — TELEPHONE ENCOUNTER
----- Message from Jael VENEGAS sent at 5/15/2025  8:33 PM EDT -----  Please call Dr Roy for foot exam

## 2025-05-22 ENCOUNTER — TELEPHONE (OUTPATIENT)
Dept: ADMINISTRATIVE | Facility: OTHER | Age: 65
End: 2025-05-22

## 2025-05-22 NOTE — TELEPHONE ENCOUNTER
Upon review of the In Basket request we were able to note that no further action is required. The patient chart is up to date as a result of a previous request.      Any additional questions or concerns should be emailed to the Practice Liaisons via the appropriate education email address, please do not reply via In Basket.    Thank you  SARAHY ZULETA MA   [facility]

## 2025-05-22 NOTE — TELEPHONE ENCOUNTER
----- Message from Carmina HAINES sent at 5/22/2025  9:22 AM EDT -----  Regarding: Care Gap Request  05/22/25 9:22 Fabian, our patient, uBrton Calvin, has had Diabetic Foot Exam completed/performed. Please assist in updating the patient chart by pulling the document from the Media Tab. The date of service is 4/30/25. Thank you,YANELI Jorge

## 2025-06-04 DIAGNOSIS — E78.5 HYPERLIPIDEMIA, UNSPECIFIED HYPERLIPIDEMIA TYPE: ICD-10-CM

## 2025-06-04 DIAGNOSIS — E11.8 TYPE 2 DIABETES MELLITUS WITH COMPLICATION (HCC): ICD-10-CM

## 2025-06-04 DIAGNOSIS — I10 PRIMARY HYPERTENSION: ICD-10-CM

## 2025-06-04 DIAGNOSIS — E53.8 B12 DEFICIENCY: ICD-10-CM

## 2025-06-04 DIAGNOSIS — N18.31 STAGE 3A CHRONIC KIDNEY DISEASE (HCC): ICD-10-CM

## 2025-06-04 NOTE — TELEPHONE ENCOUNTER
"NOT A DUPLICATE:  Patient requesting script sent to alternate pharmacy due to current pharmacy closing.     Reason for call:   [x] Refill   [] Prior Auth  [] Other:     Office:   [x] PCP/Provider -   [] Specialty/Provider -     Medication:   atorvastatin (LIPITOR) 40 mg/Take 1 tablet (40 mg total) by mouth daily     lisinopril (ZESTRIL) 10 mg/ take 1 tablet by mouth once daily     cyanocobalamin 1,000 mcg/mL/  inject 1 milliliter ( 1000 MCG ) intramuscularly Every Month     SYRINGE-NEEDLE, DISP, 3 ML (B-D SYRINGE/NEEDLE 3CC/22GX1.5) 22G X 1-1/2\" 3 ML     NovoLIN 70/30 ReliOn (70-30) 100 UNIT/ML/INJECT 75  UNITS SUBCUTANEOUSLY TWICE DAILY     Quantity:     Pharmacy: [facility] [address] [place] [address] [address] [facility]   Does the patient have enough for 3 days?   [x] Yes   [] No - Send as HP to POD    Mail Away Pharmacy   Does the patient have enough for 10 days?   [] Yes   [] No - Send as HP to POD    "

## 2025-06-05 RX ORDER — HUMAN INSULIN 100 [IU]/ML
INJECTION, SUSPENSION SUBCUTANEOUS
Qty: 50 ML | Refills: 5 | Status: SHIPPED | OUTPATIENT
Start: 2025-06-05

## 2025-06-05 RX ORDER — LISINOPRIL 10 MG/1
10 TABLET ORAL DAILY
Qty: 90 TABLET | Refills: 1 | Status: SHIPPED | OUTPATIENT
Start: 2025-06-05

## 2025-06-05 RX ORDER — ATORVASTATIN CALCIUM 40 MG/1
40 TABLET, FILM COATED ORAL DAILY
Qty: 90 TABLET | Refills: 1 | Status: SHIPPED | OUTPATIENT
Start: 2025-06-05

## 2025-06-06 RX ORDER — NEEDLES, SAFETY 22GX1 1/2"
NEEDLE, DISPOSABLE MISCELLANEOUS
Qty: 12 EACH | Refills: 0 | Status: SHIPPED | OUTPATIENT
Start: 2025-06-06

## 2025-06-06 RX ORDER — CYANOCOBALAMIN 1000 UG/ML
1000 INJECTION, SOLUTION INTRAMUSCULAR; SUBCUTANEOUS
Qty: 10 ML | Refills: 5 | Status: SHIPPED | OUTPATIENT
Start: 2025-06-06

## 2025-06-09 ENCOUNTER — APPOINTMENT (OUTPATIENT)
Dept: LAB | Facility: CLINIC | Age: 65
End: 2025-06-09
Payer: COMMERCIAL

## 2025-06-09 DIAGNOSIS — E11.42 DIABETIC POLYNEUROPATHY ASSOCIATED WITH TYPE 2 DIABETES MELLITUS (HCC): ICD-10-CM

## 2025-06-09 DIAGNOSIS — I10 HYPERTENSION WITH ALBUMINURIA: ICD-10-CM

## 2025-06-09 DIAGNOSIS — N18.31 CHRONIC KIDNEY DISEASE (CKD) STAGE G3A/A1, MODERATELY DECREASED GLOMERULAR FILTRATION RATE (GFR) BETWEEN 45-59 ML/MIN/1.73 SQUARE METER AND ALBUMINURIA CREATININE RATIO LESS THAN 30 MG/G (HCC): ICD-10-CM

## 2025-06-09 DIAGNOSIS — E66.01 OBESITY, MORBID (HCC): ICD-10-CM

## 2025-06-09 DIAGNOSIS — E78.2 MIXED HYPERLIPIDEMIA: ICD-10-CM

## 2025-06-09 DIAGNOSIS — N18.31 STAGE 3A CHRONIC KIDNEY DISEASE (HCC): ICD-10-CM

## 2025-06-09 DIAGNOSIS — E11.51 TYPE II DIABETES MELLITUS WITH PERIPHERAL CIRCULATORY DISORDER (HCC): ICD-10-CM

## 2025-06-09 DIAGNOSIS — R80.9 HYPERTENSION WITH ALBUMINURIA: ICD-10-CM

## 2025-06-09 LAB
25(OH)D3 SERPL-MCNC: 44.5 NG/ML (ref 30–100)
ALBUMIN SERPL BCG-MCNC: 4.4 G/DL (ref 3.5–5)
ALP SERPL-CCNC: 62 U/L (ref 34–104)
ALT SERPL W P-5'-P-CCNC: 21 U/L (ref 7–52)
ANION GAP SERPL CALCULATED.3IONS-SCNC: 12 MMOL/L (ref 4–13)
AST SERPL W P-5'-P-CCNC: 20 U/L (ref 13–39)
BASOPHILS # BLD AUTO: 0.1 THOUSANDS/ÂΜL (ref 0–0.1)
BASOPHILS NFR BLD AUTO: 1 % (ref 0–1)
BILIRUB SERPL-MCNC: 0.4 MG/DL (ref 0.2–1)
BUN SERPL-MCNC: 22 MG/DL (ref 5–25)
CALCIUM SERPL-MCNC: 9.4 MG/DL (ref 8.4–10.2)
CHLORIDE SERPL-SCNC: 104 MMOL/L (ref 96–108)
CHOLEST SERPL-MCNC: 139 MG/DL (ref ?–200)
CO2 SERPL-SCNC: 22 MMOL/L (ref 21–32)
CREAT SERPL-MCNC: 1.43 MG/DL (ref 0.6–1.3)
CREAT UR-MCNC: 86.6 MG/DL
EOSINOPHIL # BLD AUTO: 0.19 THOUSAND/ÂΜL (ref 0–0.61)
EOSINOPHIL NFR BLD AUTO: 3 % (ref 0–6)
ERYTHROCYTE [DISTWIDTH] IN BLOOD BY AUTOMATED COUNT: 12.8 % (ref 11.6–15.1)
EST. AVERAGE GLUCOSE BLD GHB EST-MCNC: 212 MG/DL
GFR SERPL CREATININE-BSD FRML MDRD: 51 ML/MIN/1.73SQ M
GLUCOSE P FAST SERPL-MCNC: 170 MG/DL (ref 65–99)
HBA1C MFR BLD: 9 %
HCT VFR BLD AUTO: 43.6 % (ref 36.5–49.3)
HDLC SERPL-MCNC: 57 MG/DL
HGB BLD-MCNC: 14.1 G/DL (ref 12–17)
IMM GRANULOCYTES # BLD AUTO: 0.02 THOUSAND/UL (ref 0–0.2)
IMM GRANULOCYTES NFR BLD AUTO: 0 % (ref 0–2)
LDLC SERPL CALC-MCNC: 64 MG/DL (ref 0–100)
LYMPHOCYTES # BLD AUTO: 2.03 THOUSANDS/ÂΜL (ref 0.6–4.47)
LYMPHOCYTES NFR BLD AUTO: 27 % (ref 14–44)
MCH RBC QN AUTO: 31.7 PG (ref 26.8–34.3)
MCHC RBC AUTO-ENTMCNC: 32.3 G/DL (ref 31.4–37.4)
MCV RBC AUTO: 98 FL (ref 82–98)
MICROALBUMIN UR-MCNC: 151.9 MG/L
MICROALBUMIN/CREAT 24H UR: 175 MG/G CREATININE (ref 0–30)
MONOCYTES # BLD AUTO: 0.78 THOUSAND/ÂΜL (ref 0.17–1.22)
MONOCYTES NFR BLD AUTO: 11 % (ref 4–12)
NEUTROPHILS # BLD AUTO: 4.3 THOUSANDS/ÂΜL (ref 1.85–7.62)
NEUTS SEG NFR BLD AUTO: 58 % (ref 43–75)
NRBC BLD AUTO-RTO: 0 /100 WBCS
PHOSPHATE SERPL-MCNC: 3.2 MG/DL (ref 2.3–4.1)
PLATELET # BLD AUTO: 233 THOUSANDS/UL (ref 149–390)
PMV BLD AUTO: 10.4 FL (ref 8.9–12.7)
POTASSIUM SERPL-SCNC: 5 MMOL/L (ref 3.5–5.3)
PROT SERPL-MCNC: 7.6 G/DL (ref 6.4–8.4)
PTH-INTACT SERPL-MCNC: 43.7 PG/ML (ref 12–88)
RBC # BLD AUTO: 4.45 MILLION/UL (ref 3.88–5.62)
SODIUM SERPL-SCNC: 138 MMOL/L (ref 135–147)
TRIGL SERPL-MCNC: 90 MG/DL (ref ?–150)
WBC # BLD AUTO: 7.42 THOUSAND/UL (ref 4.31–10.16)

## 2025-06-09 PROCEDURE — 83970 ASSAY OF PARATHORMONE: CPT

## 2025-06-09 PROCEDURE — 82570 ASSAY OF URINE CREATININE: CPT

## 2025-06-09 PROCEDURE — 82043 UR ALBUMIN QUANTITATIVE: CPT

## 2025-06-09 PROCEDURE — 80061 LIPID PANEL: CPT

## 2025-06-09 PROCEDURE — 85025 COMPLETE CBC W/AUTO DIFF WBC: CPT

## 2025-06-09 PROCEDURE — 80053 COMPREHEN METABOLIC PANEL: CPT

## 2025-06-09 PROCEDURE — 83036 HEMOGLOBIN GLYCOSYLATED A1C: CPT

## 2025-06-09 PROCEDURE — 84100 ASSAY OF PHOSPHORUS: CPT

## 2025-06-09 PROCEDURE — 82306 VITAMIN D 25 HYDROXY: CPT

## 2025-06-09 PROCEDURE — 36415 COLL VENOUS BLD VENIPUNCTURE: CPT

## 2025-06-10 ENCOUNTER — RESULTS FOLLOW-UP (OUTPATIENT)
Dept: FAMILY MEDICINE CLINIC | Facility: CLINIC | Age: 65
End: 2025-06-10

## 2025-06-23 ENCOUNTER — TELEPHONE (OUTPATIENT)
Age: 65
End: 2025-06-23

## 2025-06-23 NOTE — TELEPHONE ENCOUNTER
Pt calling to reschedule pharmacist appointment from last week, 6/18 with Leah Mclaughlin. Please kindly call pt back at [phone]. Thank you.

## 2025-06-27 ENCOUNTER — TELEMEDICINE (OUTPATIENT)
Dept: FAMILY MEDICINE CLINIC | Facility: CLINIC | Age: 65
End: 2025-06-27

## 2025-06-27 DIAGNOSIS — E11.51 TYPE II DIABETES MELLITUS WITH PERIPHERAL CIRCULATORY DISORDER (HCC): Primary | ICD-10-CM

## 2025-06-27 PROCEDURE — PBNCHG PB NO CHARGE PLACEHOLDER: Performed by: PHARMACIST

## 2025-06-27 NOTE — ASSESSMENT & PLAN NOTE
Lab Results   Component Value Date    HGBA1C 9.0 (H) 06/09/2025     Goal A1c <7% .   On Additional Therapies:  Statin:yes  ACEI/ARB: no     Assessment:   A1c improved slightly from 9.6 to 9%.  While this is an improvement, his diabetes remains uncontrolled which has me concerned as we are already seeing damage particularly to his kidneys from hyperglycemia.  I did have a direct discussion with the patient today regarding need for diabetes control.  I expressed my concern about further progression of CKD, neuropathy, and ischemic disease.     There are several barriers to obtaining glucose control including limited options for medication therapy due to cost.  In addition he is using mixed insulin on a sliding scale basis which is not the intended use of this formulation and he refuses to do a fixed dose or split to basal - bolus which would provide better control.     I do believe a GLP1 containing medication would be the best option for him at this time. Mounjaro would provide the most glucose control and would be the ideal agent. He cannot afford a branded co-pay. I did place a referral to social work to see if there are any programs that he might qualify for. He is already getting Farxiga and Tradjenta through patient assistance programs. We cannot do Ozempic through a PAP because they will only deliver directly to office and office policy is to not accept patient medications delivered to the office.     Plan:  Novolin mix 70/30: Continue   Farxiga: Continue 10 mg daily   Tradjenta: Continue 5 mg daily   Referral placed to social work.     DM Health maintenance  Foot exam- patient declined PCP giving foot exam       Orders:    Ambulatory Referral to Social Work Care Management Program; Future

## 2025-06-27 NOTE — PROGRESS NOTES
[facility] Clinical Pharmacy Services  Sue Mclaughlin, Pharmacist    Assessment/ Plan     Assessment & Plan  Type II diabetes mellitus with peripheral circulatory disorder (HCC)    Lab Results   Component Value Date    HGBA1C 9.0 (H) 06/09/2025     Goal A1c <7% .   On Additional Therapies:  Statin:yes  ACEI/ARB: no     Assessment:   A1c improved slightly from 9.6 to 9%.  While this is an improvement, his diabetes remains uncontrolled which has me concerned as we are already seeing damage particularly to his kidneys from hyperglycemia.  I did have a direct discussion with the patient today regarding need for diabetes control.  I expressed my concern about further progression of CKD, neuropathy, and ischemic disease.     There are several barriers to obtaining glucose control including limited options for medication therapy due to cost.  In addition he is using mixed insulin on a sliding scale basis which is not the intended use of this formulation and he refuses to do a fixed dose or split to basal - bolus which would provide better control.     I do believe a GLP1 containing medication would be the best option for him at this time. Mounjaro would provide the most glucose control and would be the ideal agent. He cannot afford a branded co-pay. I did place a referral to social work to see if there are any programs that he might qualify for. He is already getting Farxiga and Tradjenta through patient assistance programs. We cannot do Ozempic through a PAP because they will only deliver directly to office and office policy is to not accept patient medications delivered to the office.     Plan:  Novolin mix 70/30: Continue   Farxiga: Continue 10 mg daily   Tradjenta: Continue 5 mg daily   Referral placed to social work.     DM Health maintenance  Foot exam- patient declined PCP giving foot exam       Orders:    Ambulatory Referral to Social Work Care Management Program; Future     Follow-up: 8 weeks     Subjective    HPI:  Appointment today is for follow up of Type 2 DM. His A1c is uncontrolled. DM related complications include CKD, neuropathy, and peripheral circulatory disorder He has an unusual regimen with his Novolin 70/30 mix.  He injects anywhere from 2-4 times per day based on if his blood sugar is higher than 160 mg/dL.  I have explained to him before that mixed insulin is supposed to be used at a fixed dose however he says that he was told to do it this way by his previous PCP and thus the way he has been doing it for years now.  Because of this sliding scale type of dosing regimen, he may or may not get basal insulin when he misses a dose.  I  do think that he would benefit from splitting his Novolin 70/30 into a split basal-bolus regimen but he declined.        Medication Adherence/ Tolerability/ Cost:  Novolin 70/30 mix vial-   If > 200 mg/dL he will inject anywhere from 85 and 100 units   If 150 - 200 mg/dL: 50 units   If < 150 mg/dL he will skip his injection  Injects between 2-3 injections per day  Dapagliflozin 10 mg daily  Tradjenta 5 mg    Previous medications (reason for discontinuation):  Metformin (kidney function)      Review of Systems     2. Lifestyle:       3. Home monitoring devices  Glucometer: Yes, Brand:   Continuous Glucose Monitor: No- patient declines.    Objective     ASCVD Risk:  The 10-year ASCVD risk score (Perla OATES, et al., 2019) is: 12.3%    Values used to calculate the score:      Age: 65 years      Clincally relevant sex: Male      Is Non- : No      Diabetic: Yes      Tobacco smoker: No      Systolic Blood Pressure: 102 mmHg      Is BP treated: Yes      HDL Cholesterol: 57 mg/dL      Total Cholesterol: 139 mg/dL     Vitals:  There were no vitals filed for this visit.    Eye Exam:    Lab Results   Component Value Date    LEFTDIABRET Positive 09/26/2024    RIGHTDIABRET Positive 09/26/2024       Labs:  Lab Results   Component Value Date    SODIUM 138 06/09/2025     K 5.0 06/09/2025    EGFR 51 06/09/2025    CREATININE 1.43 (H) 06/09/2025    GLUF 170 (H) 06/09/2025    [id number] 611 03/21/2023    MICROALBCRE 175 (H) 06/09/2025       Lab Results   Component Value Date    HGBA1C 9.0 (H) 06/09/2025    HGBA1C 9.6 (H) 12/30/2024    HGBA1C 9.0 (H) 10/03/2024         Pharmacist Tracking Tool     Pharmacist Tracking Tool  Reason For Outreach: Embedded Pharmacist  Demographics:  Intervention Method: Phone  Type of Intervention: Follow-Up  Topics Addressed: Diabetes  Pharmacologic Interventions: N/A  Non-Pharmacologic Interventions: Adherence addressed, Care coordination, Cost, Disease state education, and Medication/Device education  Time:  Direct Patient Care: 15 mins  Care Coordination: 10 mins  Recommendation Recipient: Patient/Caregiver and Provider  Outcome: Accepted

## 2025-07-01 ENCOUNTER — PATIENT OUTREACH (OUTPATIENT)
Dept: CASE MANAGEMENT | Facility: OTHER | Age: 65
End: 2025-07-01

## 2025-07-01 NOTE — PROGRESS NOTES
ALESHIA ESTRADA received referral for patient from Clinical Pharmacist for medication assistance. SW referral placed for options for this. It is already noted that patient is receiving Farxiga and Tradjenta through PAPs. Ozempic cannot be done through a PAP because they will only deliver directly to an office and the office policy is that they do not accept patient medications to be delivered. Referral also placed for assistance with utilities.    Patient does not have any prior OP ALESHIA ESTRADA outreach.    ALESHIA ESTRADA placed initial outreach call to patient and left a voicemail. ALESHIA CM to place second outreach call within one week if return call is not received prior.    ADDENDUM:    ALESHIA ESTRADA received return call from patient. ALESHIA ESTRADA introduced self, explained role and reasoning for outreach. Confirmed the information above regarding his medications. ALESHIA ESTRADA briefly reviewed the [employer] ClearingHouse and patient was interested in applying. ALESHIA ESTRADA reviewed role of CMOC and patient was agreeable to referral.    ALESHIA ESTRADA reviewed utilities and patient said he does not have a concern at this time, but his oil becomes a bit of a concern when it starts to get colder. ALESHIA ESTRADA asked if patient had ever looked into [employer] and he stated that he has, but he Is usually told that they are over income. ALESHIA ESTRADA stated that the applications are closed for the season now, but once opened, patient can request assistance with applying during that time. Patient understanding.    ALESHIA ESTRADA enrolled patient in program, added self to care team and added CMOC to Supports and Services. ALESHIA ESTRADA to follow. Note routed to Clinical Pharmacist to update.

## 2025-07-08 ENCOUNTER — PATIENT OUTREACH (OUTPATIENT)
Dept: CASE MANAGEMENT | Facility: OTHER | Age: 65
End: 2025-07-08

## 2025-07-08 NOTE — PROGRESS NOTES
GREGORY received a referral from SEAN Duenas to assist patient with applying for medication assistance through [employer].    CMDIA attempted to contact Jerald to discuss the referral.   No answer. Left message on voicemail with reason for call, this writer's contact information, and a request for a call back to assist.     Will outreach next week if no contact prior.     ADDENDUM:  Jerald returned call. He's available to complete the [employer] application now.     GREGORY completed the application with Jerald via [employer] portal website. During the application he provided his Medicare A/B ID#. This was updated with  Billing dept as well.     No other questions or needs currently.     Will outreach next week for a status update.

## 2025-07-11 ENCOUNTER — TELEMEDICINE (OUTPATIENT)
Dept: FAMILY MEDICINE CLINIC | Facility: CLINIC | Age: 65
End: 2025-07-11

## 2025-07-11 DIAGNOSIS — E11.51 TYPE II DIABETES MELLITUS WITH PERIPHERAL CIRCULATORY DISORDER (HCC): Primary | ICD-10-CM

## 2025-07-11 PROCEDURE — NURSE: Performed by: PHARMACIST

## 2025-07-11 NOTE — PROGRESS NOTES
[facility] Clinical Pharmacy Services  Sue Mclaughlin Pharmacist    Assessment/ Plan     Assessment & Plan  Type II diabetes mellitus with peripheral circulatory disorder (HCC)    Lab Results   Component Value Date    HGBA1C 9.0 (H) 06/09/2025     Goal A1c <7% .   On Additional Therapies:  Statin:yes  ACEI/ARB: no     Assessment:   Patient applied for PA clearing house application with the assistance of social work.  We are awaiting a determination.  If approved we are hoping to be able to initiate Mounjaro for his diabetes management.    Plan:  Novolin mix 70/30: Continue   Farxiga: Continue 10 mg daily   Tradjenta: Continue 5 mg daily       DM Health maintenance  Foot exam- patient declined PCP giving foot exam              Follow-up: 2 weeks     Subjective   HPI:  Appointment today is for follow up of Type 2 DM. His A1c is uncontrolled. DM related complications include CKD, neuropathy, and peripheral circulatory disorder He has an unusual regimen with his Novolin 70/30 mix.  We are in the process of applying for financial assistance with his medications with the help of social work so that we can hopefully have affordable access to a GLP-1 containing medication.  Application was submitted and we are waiting update on status of approval.        Medication Adherence/ Tolerability/ Cost:  Novolin 70/30 mix vial-   If > 200 mg/dL he will inject anywhere from 85 and 100 units   If 150 - 200 mg/dL: 50 units   If < 150 mg/dL he will skip his injection  Injects between 2-3 injections per day  Dapagliflozin 10 mg daily  Tradjenta 5 mg    Previous medications (reason for discontinuation):  Metformin (kidney function)      Review of Systems     2. Lifestyle:       3. Home monitoring devices  Glucometer: Yes, Brand:   Continuous Glucose Monitor: No- patient declines.    Objective     ASCVD Risk:  The 10-year ASCVD risk score (Perla OATES, et al., 2019) is: 12.3%    Values used to calculate the score:      Age: 65 years       Clincally relevant sex: Male      Is Non- : No      Diabetic: Yes      Tobacco smoker: No      Systolic Blood Pressure: 102 mmHg      Is BP treated: Yes      HDL Cholesterol: 57 mg/dL      Total Cholesterol: 139 mg/dL     Vitals:  There were no vitals filed for this visit.    Eye Exam:    Lab Results   Component Value Date    LEFTDIABRET Positive 09/26/2024    RIGHTDIABRET Positive 09/26/2024       Labs:  Lab Results   Component Value Date    SODIUM 138 06/09/2025    K 5.0 06/09/2025    EGFR 51 06/09/2025    CREATININE 1.43 (H) 06/09/2025    GLUF 170 (H) 06/09/2025    [id number] 611 03/21/2023    MICROALBCRE 175 (H) 06/09/2025       Lab Results   Component Value Date    HGBA1C 9.0 (H) 06/09/2025    HGBA1C 9.6 (H) 12/30/2024    HGBA1C 9.0 (H) 10/03/2024         Pharmacist Tracking Tool     Pharmacist Tracking Tool  Reason For Outreach: Embedded Pharmacist  Demographics:  Intervention Method: Phone  Type of Intervention: Follow-Up  Topics Addressed: Diabetes  Pharmacologic Interventions: N/A  Non-Pharmacologic Interventions: Chart update and Cost  Time:  Direct Patient Care: 10 mins  Care Coordination: 5 mins  Recommendation Recipient: Patient/Caregiver and Provider  Outcome: Accepted

## 2025-07-11 NOTE — ASSESSMENT & PLAN NOTE
Lab Results   Component Value Date    HGBA1C 9.0 (H) 06/09/2025     Goal A1c <7% .   On Additional Therapies:  Statin:yes  ACEI/ARB: no     Assessment:   Patient applied for PA clearing house application with the assistance of social work.  We are awaiting a determination.  If approved we are hoping to be able to initiate Mounjaro for his diabetes management.    Plan:  Novolin mix 70/30: Continue   Farxiga: Continue 10 mg daily   Tradjenta: Continue 5 mg daily       DM Health maintenance  Foot exam- patient declined PCP giving foot exam

## 2025-07-17 ENCOUNTER — PATIENT OUTREACH (OUTPATIENT)
Dept: CASE MANAGEMENT | Facility: OTHER | Age: 65
End: 2025-07-17

## 2025-07-17 NOTE — PROGRESS NOTES
Care Management  outreached patient to follow-up on resources previously provided.  Outcome regarding these resources was Pending Determination.    Care Management  outreached community agency ([facility] to check status of referral that was previously placed for patient.    Care Management  provided update to Care Manager and scheduled Next Outreach with patient to follow-up on Medication Assistance.

## 2025-07-22 ENCOUNTER — PATIENT OUTREACH (OUTPATIENT)
Dept: CASE MANAGEMENT | Facility: OTHER | Age: 65
End: 2025-07-22

## 2025-07-23 ENCOUNTER — TELEPHONE (OUTPATIENT)
Dept: FAMILY MEDICINE CLINIC | Facility: CLINIC | Age: 65
End: 2025-07-23

## 2025-07-23 ENCOUNTER — OFFICE VISIT (OUTPATIENT)
Dept: FAMILY MEDICINE CLINIC | Facility: CLINIC | Age: 65
End: 2025-07-23
Payer: COMMERCIAL

## 2025-07-23 ENCOUNTER — PATIENT OUTREACH (OUTPATIENT)
Dept: CASE MANAGEMENT | Facility: OTHER | Age: 65
End: 2025-07-23

## 2025-07-23 VITALS
SYSTOLIC BLOOD PRESSURE: 122 MMHG | TEMPERATURE: 97.8 F | HEIGHT: 71 IN | WEIGHT: 257 LBS | OXYGEN SATURATION: 95 % | HEART RATE: 83 BPM | DIASTOLIC BLOOD PRESSURE: 60 MMHG | BODY MASS INDEX: 35.98 KG/M2

## 2025-07-23 DIAGNOSIS — E78.2 MIXED HYPERLIPIDEMIA: ICD-10-CM

## 2025-07-23 DIAGNOSIS — K04.7 DENTAL INFECTION: ICD-10-CM

## 2025-07-23 DIAGNOSIS — Z79.4 TYPE 2 DIABETES MELLITUS WITH PROLIFERATIVE RETINOPATHY AND MACULAR EDEMA, WITH LONG-TERM CURRENT USE OF INSULIN, UNSPECIFIED LATERALITY (HCC): ICD-10-CM

## 2025-07-23 DIAGNOSIS — I10 PRIMARY HYPERTENSION: ICD-10-CM

## 2025-07-23 DIAGNOSIS — E11.42 DIABETIC POLYNEUROPATHY ASSOCIATED WITH TYPE 2 DIABETES MELLITUS (HCC): ICD-10-CM

## 2025-07-23 DIAGNOSIS — Z12.11 SCREEN FOR COLON CANCER: ICD-10-CM

## 2025-07-23 DIAGNOSIS — E66.01 OBESITY, MORBID (HCC): ICD-10-CM

## 2025-07-23 DIAGNOSIS — E11.3519 TYPE 2 DIABETES MELLITUS WITH PROLIFERATIVE RETINOPATHY AND MACULAR EDEMA, WITH LONG-TERM CURRENT USE OF INSULIN, UNSPECIFIED LATERALITY (HCC): ICD-10-CM

## 2025-07-23 DIAGNOSIS — N18.31 STAGE 3A CHRONIC KIDNEY DISEASE (HCC): ICD-10-CM

## 2025-07-23 DIAGNOSIS — R59.1 LYMPHADENOPATHY: ICD-10-CM

## 2025-07-23 DIAGNOSIS — E11.51 TYPE II DIABETES MELLITUS WITH PERIPHERAL CIRCULATORY DISORDER (HCC): Primary | ICD-10-CM

## 2025-07-23 PROCEDURE — G2211 COMPLEX E/M VISIT ADD ON: HCPCS

## 2025-07-23 PROCEDURE — 99214 OFFICE O/P EST MOD 30 MIN: CPT

## 2025-07-23 NOTE — ASSESSMENT & PLAN NOTE
Lab Results   Component Value Date    EGFR 51 06/09/2025    EGFR 41 12/30/2024    EGFR 53 10/03/2024    CREATININE 1.43 (H) 06/09/2025    CREATININE 1.72 (H) 12/30/2024    CREATININE 1.39 (H) 10/03/2024     Will continue to monitor.  Avoid nephrotoxic agents.

## 2025-07-23 NOTE — ASSESSMENT & PLAN NOTE
Lab Results   Component Value Date    HGBA1C 9.0 (H) 06/09/2025     Continue with regular eye exams.  Orders:    Hemoglobin A1C; Future

## 2025-07-23 NOTE — ASSESSMENT & PLAN NOTE
Lab Results   Component Value Date    HGBA1C 9.0 (H) 06/09/2025     Awaiting application approval through [employer] Cle[employer].  This will hopefully cover Mounjaro for diabetic control.  Continue follow-up with care management regarding this.  Continue follow-up with clinical pharmacy as well.  Due for repeat A1c on 9/9.  Educated on sequelae of uncontrolled diabetes.  Orders:    Hemoglobin A1C; Future

## 2025-07-23 NOTE — PROGRESS NOTES
Care Management  outreached patient to follow-up on resources previously provided ([facility]).  Outcome regarding these resources was Pending Determination.    On last outreach, contacted [facility] for an update. The rep confirmed receipt of the application but states they are short staffed and the application will be processing in the order received. Will check status again on next outreach.     Care Management  attempted to outreach patient to follow-up.  No answer, voicemail left, and awaiting return call.  Update provided to Care Manager.

## 2025-07-23 NOTE — ASSESSMENT & PLAN NOTE
Lab Results   Component Value Date    HGBA1C 9.0 (H) 06/09/2025     Continue follow-up with podiatry.  Orders:    Hemoglobin A1C; Future

## 2025-07-23 NOTE — PROGRESS NOTES
Name: Jerald Calvin      : 1960      MRN: [id number]  Encounter Provider: Chip Amezcua PA-C  Encounter Date: 2025   Encounter department: [facility]  :  Assessment & Plan  Type II diabetes mellitus with peripheral circulatory disorder (HCC)    Lab Results   Component Value Date    HGBA1C 9.0 (H) 2025     Awaiting application approval through [facility].  This will hopefully cover Mounjaro for diabetic control.  Continue follow-up with care management regarding this.  Continue follow-up with clinical pharmacy as well.  Due for repeat A1c on .  Educated on sequelae of uncontrolled diabetes.  Orders:    Hemoglobin A1C; Future    Diabetic polyneuropathy associated with type 2 diabetes mellitus (HCC)    Lab Results   Component Value Date    HGBA1C 9.0 (H) 2025     Continue follow-up with podiatry.  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  Orders:    Hemoglobin A1C; Future    Primary hypertension  At goal today.  Continue lisinopril.  Encouraged to take at home blood pressures and contact office if persistently elevated.       Stage 3a chronic kidney disease (HCC)  Lab Results   Component Value Date    EGFR 51 2025    EGFR 41 2024    EGFR 53 10/03/2024    CREATININE 1.43 (H) 2025    CREATININE 1.72 (H) 2024    CREATININE 1.39 (H) 10/03/2024     Will continue to monitor.  Avoid nephrotoxic agents.       Obesity, morbid (HCC)  Educated on healthy diet and activity.       Mixed hyperlipidemia  We will continue to monitor.  Continue Lipitor.  Educated on healthy diet and activity.       Dental infection  Given erythema  right lower molar, lymphadenopathy at mandibular angle, and referred ear pain  with no abnormality on otoscopy, gland swelling likely due to dental infection.  Patient is to contact office once complete with antibiotic if no improvement, or earlier with worsening.  Also placed ultrasound to evaluate if no improvement with antibiotic treatment and resolution of infection.  Educated on ER precautions.  Orders:    amoxicillin-clavulanate (AUGMENTIN) 875-125 mg per tablet; Take 1 tablet by mouth every 12 (twelve) hours for 7 days    Lymphadenopathy  See above.  Orders:    amoxicillin-clavulanate (AUGMENTIN) 875-125 mg per tablet; Take 1 tablet by mouth every 12 (twelve) hours for 7 days    US head neck soft tissue; Future    Screen for colon cancer  Risks/benefits discussed.  Patient declines.  Contact office if would like to complete.               History of Present Illness   Patient is a 65-year-old male presenting for follow-up visit.  Patient has been having right sided gland swelling on the face.  Referred pain to ear.  Denies dental pain, fever, chills.  Located at mandibular angle.  Patient is currently going through process with [facility] to get [facility] covered for diabetic control.  Still awaiting application approval.      Review of Systems   Constitutional:  Negative for appetite change, chills, diaphoresis, fatigue and fever.   HENT:  Positive for ear pain. Negative for congestion, ear discharge, postnasal drip, rhinorrhea, sinus pressure, sinus pain, sneezing and sore throat.    Eyes:  Negative for pain, discharge, redness, itching and visual disturbance.   Respiratory:  Negative for apnea, cough, chest tightness, shortness of breath and wheezing.    Cardiovascular:  Negative for chest pain, palpitations and leg swelling.   Gastrointestinal:  Negative for abdominal pain, blood in stool, constipation, diarrhea, nausea and vomiting.   Endocrine: Negative for cold intolerance, heat intolerance, polydipsia and polyuria.   Genitourinary:  Negative for dysuria, flank pain,  "frequency, hematuria and urgency.   Musculoskeletal:  Negative for arthralgias, back pain, myalgias, neck pain and neck stiffness.   Skin:  Negative for color change and rash.   Allergic/Immunologic: Negative.    Neurological:  Negative for dizziness, tremors, seizures, syncope, facial asymmetry, speech difficulty, weakness, light-headedness, numbness and headaches.   Hematological:  Positive for adenopathy. Does not bruise/bleed easily.   Psychiatric/Behavioral:  Negative for agitation, confusion, decreased concentration, dysphoric mood, hallucinations, self-injury, sleep disturbance and suicidal ideas. The patient is not nervous/anxious and is not hyperactive.    All other systems reviewed and are negative.      Objective   /66 (BP Location: Left arm, Patient Position: Sitting)   Pulse 83   Temp 97.8 °F (36.6 °C) (Tympanic)   Ht 5' 11\" (1.803 m)   Wt 117 kg (257 lb)   SpO2 95%   BMI 35.84 kg/m²      Physical Exam  Vitals and nursing note reviewed.   Constitutional:       General: He is not in acute distress.     Appearance: Normal appearance. He is well-developed. He is obese. He is not ill-appearing, toxic-appearing or diaphoretic.   HENT:      Head: Normocephalic and atraumatic.      Right Ear: Tympanic membrane normal.      Left Ear: Tympanic membrane normal.      Nose: Nose normal.      Mouth/Throat:      Mouth: Mucous membranes are moist.      Pharynx: Oropharynx is clear. Posterior oropharyngeal erythema present. No oropharyngeal exudate.     Eyes:      Extraocular Movements: Extraocular movements intact.      Conjunctiva/sclera: Conjunctivae normal.      Pupils: Pupils are equal, round, and reactive to light.     Neck:      Vascular: No carotid bruit.     Cardiovascular:      Rate and Rhythm: Normal rate and regular rhythm.      Pulses: Normal pulses.      Heart sounds: Normal heart sounds. No murmur heard.  Pulmonary:      Effort: Pulmonary effort is normal. No respiratory distress.      Breath " sounds: Normal breath sounds. No wheezing.   Chest:      Chest wall: No tenderness.   Abdominal:      General: Bowel sounds are normal.      Palpations: Abdomen is soft. There is no mass.      Tenderness: There is no abdominal tenderness.     Musculoskeletal:         General: No swelling or tenderness. Normal range of motion.      Cervical back: Normal range of motion and neck supple. No tenderness.      Right lower leg: No edema.      Left lower leg: No edema.   Lymphadenopathy:      Cervical: No cervical adenopathy.     Skin:     General: Skin is warm and dry.      Capillary Refill: Capillary refill takes less than 2 seconds.      Findings: No erythema or rash.     Neurological:      General: No focal deficit present.      Mental Status: He is alert and oriented to person, place, and time. Mental status is at baseline.      Cranial Nerves: No cranial nerve deficit.      Motor: No weakness.      Coordination: Coordination normal.      Gait: Gait normal.     Psychiatric:         Mood and Affect: Mood normal.         Behavior: Behavior normal.         Thought Content: Thought content normal.         Judgment: Judgment normal.

## 2025-07-23 NOTE — ASSESSMENT & PLAN NOTE
At goal today.  Continue lisinopril.  Encouraged to take at home blood pressures and contact office if persistently elevated.

## 2025-07-24 DIAGNOSIS — R59.1 LYMPHADENOPATHY: ICD-10-CM

## 2025-07-24 DIAGNOSIS — K04.7 DENTAL INFECTION: ICD-10-CM

## 2025-07-24 NOTE — TELEPHONE ENCOUNTER
Reason for call:   [x] Refill   [] Prior Auth  [x] Other: Wrong Pharmacy - please resend     Office:   [x] PCP/Provider -   [] Specialty/Provider -     Medication:     amoxicillin-clavulanate (AUGMENTIN) 875-125 mgTake 1 tablet by mouth every 12 (twelve) hours for 7 days        Pharmacy: [facility]   Does the patient have enough for 3 days?   [] Yes   [x] No - Send as HP to POD    Mail Away Pharmacy   Does the patient have enough for 10 days?   [] Yes   [] No - Send as HP to POD

## 2025-07-29 ENCOUNTER — TELEMEDICINE (OUTPATIENT)
Dept: FAMILY MEDICINE CLINIC | Facility: CLINIC | Age: 65
End: 2025-07-29

## 2025-07-29 DIAGNOSIS — Z79.4 TYPE 2 DIABETES MELLITUS WITH PROLIFERATIVE RETINOPATHY AND MACULAR EDEMA, WITH LONG-TERM CURRENT USE OF INSULIN, UNSPECIFIED LATERALITY (HCC): Primary | ICD-10-CM

## 2025-07-29 DIAGNOSIS — E11.51 TYPE II DIABETES MELLITUS WITH PERIPHERAL CIRCULATORY DISORDER (HCC): ICD-10-CM

## 2025-07-29 DIAGNOSIS — E11.3519 TYPE 2 DIABETES MELLITUS WITH PROLIFERATIVE RETINOPATHY AND MACULAR EDEMA, WITH LONG-TERM CURRENT USE OF INSULIN, UNSPECIFIED LATERALITY (HCC): Primary | ICD-10-CM

## 2025-07-29 PROCEDURE — PBNCHG PB NO CHARGE PLACEHOLDER: Performed by: PHARMACIST

## 2025-07-31 ENCOUNTER — PATIENT OUTREACH (OUTPATIENT)
Dept: CASE MANAGEMENT | Facility: OTHER | Age: 65
End: 2025-07-31

## 2025-08-05 ENCOUNTER — PATIENT OUTREACH (OUTPATIENT)
Dept: CASE MANAGEMENT | Facility: OTHER | Age: 65
End: 2025-08-05

## 2025-08-15 ENCOUNTER — VBI (OUTPATIENT)
Dept: ADMINISTRATIVE | Facility: OTHER | Age: 65
End: 2025-08-15

## 2025-08-20 DIAGNOSIS — E11.51 TYPE II DIABETES MELLITUS WITH PERIPHERAL CIRCULATORY DISORDER (HCC): ICD-10-CM

## 2025-08-20 RX ORDER — DAPAGLIFLOZIN 10 MG/1
10 TABLET, FILM COATED ORAL DAILY
Qty: 90 TABLET | Refills: 3 | Status: SHIPPED | OUTPATIENT
Start: 2025-08-20

## 2025-08-21 ENCOUNTER — PATIENT OUTREACH (OUTPATIENT)
Dept: CASE MANAGEMENT | Facility: OTHER | Age: 65
End: 2025-08-21